# Patient Record
Sex: FEMALE | Race: WHITE | Employment: OTHER | URBAN - NONMETROPOLITAN AREA
[De-identification: names, ages, dates, MRNs, and addresses within clinical notes are randomized per-mention and may not be internally consistent; named-entity substitution may affect disease eponyms.]

---

## 2017-09-14 RX ORDER — DEXLANSOPRAZOLE 60 MG/1
CAPSULE, DELAYED RELEASE ORAL
Qty: 90 CAPSULE | Refills: 3 | OUTPATIENT
Start: 2017-09-14

## 2017-11-07 ENCOUNTER — OFFICE VISIT (OUTPATIENT)
Dept: SURGERY | Age: 72
End: 2017-11-07
Payer: MEDICARE

## 2017-11-07 VITALS
DIASTOLIC BLOOD PRESSURE: 80 MMHG | WEIGHT: 233 LBS | BODY MASS INDEX: 38.82 KG/M2 | HEIGHT: 65 IN | SYSTOLIC BLOOD PRESSURE: 130 MMHG | HEART RATE: 76 BPM

## 2017-11-07 DIAGNOSIS — R22.9 LOCALIZED SKIN MASS, LUMP, OR SWELLING: Primary | ICD-10-CM

## 2017-11-07 PROCEDURE — G8400 PT W/DXA NO RESULTS DOC: HCPCS | Performed by: PHYSICIAN ASSISTANT

## 2017-11-07 PROCEDURE — 1090F PRES/ABSN URINE INCON ASSESS: CPT | Performed by: PHYSICIAN ASSISTANT

## 2017-11-07 PROCEDURE — 4040F PNEUMOC VAC/ADMIN/RCVD: CPT | Performed by: PHYSICIAN ASSISTANT

## 2017-11-07 PROCEDURE — G8484 FLU IMMUNIZE NO ADMIN: HCPCS | Performed by: PHYSICIAN ASSISTANT

## 2017-11-07 PROCEDURE — 3014F SCREEN MAMMO DOC REV: CPT | Performed by: PHYSICIAN ASSISTANT

## 2017-11-07 PROCEDURE — G8428 CUR MEDS NOT DOCUMENT: HCPCS | Performed by: PHYSICIAN ASSISTANT

## 2017-11-07 PROCEDURE — 99212 OFFICE O/P EST SF 10 MIN: CPT | Performed by: PHYSICIAN ASSISTANT

## 2017-11-07 PROCEDURE — 1123F ACP DISCUSS/DSCN MKR DOCD: CPT | Performed by: PHYSICIAN ASSISTANT

## 2017-11-07 PROCEDURE — 4004F PT TOBACCO SCREEN RCVD TLK: CPT | Performed by: PHYSICIAN ASSISTANT

## 2017-11-07 PROCEDURE — 3017F COLORECTAL CA SCREEN DOC REV: CPT | Performed by: PHYSICIAN ASSISTANT

## 2017-11-07 PROCEDURE — G8417 CALC BMI ABV UP PARAM F/U: HCPCS | Performed by: PHYSICIAN ASSISTANT

## 2017-11-07 RX ORDER — DULOXETIN HYDROCHLORIDE 30 MG/1
CAPSULE, DELAYED RELEASE ORAL
COMMUNITY
Start: 2016-09-26

## 2017-11-07 RX ORDER — BIMATOPROST 0.01 %
DROPS OPHTHALMIC (EYE)
COMMUNITY
Start: 2017-09-15

## 2017-11-07 NOTE — Clinical Note
US neck and appt with me the dame day. The only order for US neck included thyroid. Please tell scheduling that we only need to scan the mass, not the thyroid.

## 2017-11-08 NOTE — PROGRESS NOTES
Patient is scheduled on [email or web address] for US and will see Bailee Shafer at 3 Pm. Patient has been notified.

## 2017-11-16 ENCOUNTER — OFFICE VISIT (OUTPATIENT)
Dept: SURGERY | Age: 72
End: 2017-11-16
Payer: MEDICARE

## 2017-11-16 ENCOUNTER — HOSPITAL ENCOUNTER (OUTPATIENT)
Dept: ULTRASOUND IMAGING | Age: 72
Discharge: HOME | End: 2017-11-16
Payer: MEDICARE

## 2017-11-16 VITALS — SYSTOLIC BLOOD PRESSURE: 140 MMHG | DIASTOLIC BLOOD PRESSURE: 70 MMHG | HEART RATE: 80 BPM

## 2017-11-16 DIAGNOSIS — M89.9 LYTIC BONE LESIONS ON XRAY: ICD-10-CM

## 2017-11-16 DIAGNOSIS — R22.1 NECK MASS: Primary | ICD-10-CM

## 2017-11-16 DIAGNOSIS — R93.89 ABNORMAL CT SCAN, NECK: ICD-10-CM

## 2017-11-16 DIAGNOSIS — R22.9 LOCALIZED SKIN MASS, LUMP, OR SWELLING: ICD-10-CM

## 2017-11-16 PROCEDURE — G8417 CALC BMI ABV UP PARAM F/U: HCPCS | Performed by: PHYSICIAN ASSISTANT

## 2017-11-16 PROCEDURE — 3017F COLORECTAL CA SCREEN DOC REV: CPT | Performed by: PHYSICIAN ASSISTANT

## 2017-11-16 PROCEDURE — 76536 US EXAM OF HEAD AND NECK: CPT

## 2017-11-16 PROCEDURE — 99211 OFF/OP EST MAY X REQ PHY/QHP: CPT | Performed by: PHYSICIAN ASSISTANT

## 2017-11-16 PROCEDURE — G8427 DOCREV CUR MEDS BY ELIG CLIN: HCPCS | Performed by: PHYSICIAN ASSISTANT

## 2017-11-16 PROCEDURE — 4040F PNEUMOC VAC/ADMIN/RCVD: CPT | Performed by: PHYSICIAN ASSISTANT

## 2017-11-16 NOTE — Clinical Note
Please schedule pt for bone scan, CT chest abdomen and pelvis with contrast.  She has a very suspicious mass. Please schedule this for Tuesday or Wednesday before her appt with Dr. Houston Lopez.  Please tell her the mass was suspicious and we need to do more scans to rule out any other abnormalities. I'll be happy to talk to her if she has questions.

## 2017-11-17 ENCOUNTER — TELEPHONE (OUTPATIENT)
Dept: SURGERY | Age: 72
End: 2017-11-17

## 2017-11-17 NOTE — TELEPHONE ENCOUNTER
Left VM for patient of appointments for:    CT Scan at [facility] on 11/27/2017 at 8 Am  Will see Verónica Jackson for a results and exam on 12/4/2017 at 1:45 Pm. I also sent appointment cards in the mail for verification.

## 2017-11-27 ENCOUNTER — HOSPITAL ENCOUNTER (OUTPATIENT)
Dept: CT IMAGING | Age: 72
Discharge: HOME | End: 2017-11-27
Payer: MEDICARE

## 2017-11-27 DIAGNOSIS — R22.1 NECK MASS: ICD-10-CM

## 2017-11-27 LAB
GFR NON-AFRICAN AMERICAN: >60
PERFORMED ON: NORMAL
POC CREATININE: 0.9 MG/DL (ref 0.3–1.3)
POC SAMPLE TYPE: NORMAL

## 2017-11-27 PROCEDURE — 70491 CT SOFT TISSUE NECK W/DYE: CPT

## 2017-11-27 PROCEDURE — 82565 ASSAY OF CREATININE: CPT

## 2017-11-27 PROCEDURE — 6360000004 HC RX CONTRAST MEDICATION: Performed by: PHYSICIAN ASSISTANT

## 2017-11-27 RX ADMIN — IOPAMIDOL 90 ML: 755 INJECTION, SOLUTION INTRAVENOUS at 08:11

## 2017-12-06 ENCOUNTER — HOSPITAL ENCOUNTER (OUTPATIENT)
Dept: CT IMAGING | Age: 72
Discharge: HOME | End: 2017-12-06
Payer: MEDICARE

## 2017-12-06 ENCOUNTER — HOSPITAL ENCOUNTER (OUTPATIENT)
Dept: NUCLEAR MEDICINE | Age: 72
Discharge: HOME | End: 2017-12-06
Payer: MEDICARE

## 2017-12-06 ENCOUNTER — OFFICE VISIT (OUTPATIENT)
Dept: SURGERY | Age: 72
End: 2017-12-06
Payer: MEDICARE

## 2017-12-06 ENCOUNTER — TELEPHONE (OUTPATIENT)
Dept: SURGERY | Age: 72
End: 2017-12-06

## 2017-12-06 VITALS
SYSTOLIC BLOOD PRESSURE: 142 MMHG | WEIGHT: 233 LBS | HEART RATE: 76 BPM | BODY MASS INDEX: 38.82 KG/M2 | DIASTOLIC BLOOD PRESSURE: 82 MMHG | HEIGHT: 65 IN

## 2017-12-06 DIAGNOSIS — R22.1 NECK MASS: Primary | ICD-10-CM

## 2017-12-06 DIAGNOSIS — R22.1 NECK MASS: ICD-10-CM

## 2017-12-06 DIAGNOSIS — M89.9 LYTIC BONE LESIONS ON XRAY: ICD-10-CM

## 2017-12-06 DIAGNOSIS — R22.1 MASS OF RIGHT SIDE OF NECK: ICD-10-CM

## 2017-12-06 DIAGNOSIS — R93.89 ABNORMAL CT SCAN, NECK: ICD-10-CM

## 2017-12-06 LAB
GFR NON-AFRICAN AMERICAN: >60
PERFORMED ON: NORMAL
POC CREATININE: 0.8 MG/DL (ref 0.3–1.3)
POC SAMPLE TYPE: NORMAL

## 2017-12-06 PROCEDURE — 1090F PRES/ABSN URINE INCON ASSESS: CPT | Performed by: SURGERY

## 2017-12-06 PROCEDURE — 82565 ASSAY OF CREATININE: CPT

## 2017-12-06 PROCEDURE — 1036F TOBACCO NON-USER: CPT | Performed by: SURGERY

## 2017-12-06 PROCEDURE — 3014F SCREEN MAMMO DOC REV: CPT | Performed by: SURGERY

## 2017-12-06 PROCEDURE — 4040F PNEUMOC VAC/ADMIN/RCVD: CPT | Performed by: SURGERY

## 2017-12-06 PROCEDURE — 6360000004 HC RX CONTRAST MEDICATION: Performed by: PHYSICIAN ASSISTANT

## 2017-12-06 PROCEDURE — G8400 PT W/DXA NO RESULTS DOC: HCPCS | Performed by: SURGERY

## 2017-12-06 PROCEDURE — 74177 CT ABD & PELVIS W/CONTRAST: CPT

## 2017-12-06 PROCEDURE — 71260 CT THORAX DX C+: CPT

## 2017-12-06 PROCEDURE — G8427 DOCREV CUR MEDS BY ELIG CLIN: HCPCS | Performed by: SURGERY

## 2017-12-06 PROCEDURE — 3430000000 HC RX DIAGNOSTIC RADIOPHARMACEUTICAL: Performed by: PHYSICIAN ASSISTANT

## 2017-12-06 PROCEDURE — G8417 CALC BMI ABV UP PARAM F/U: HCPCS | Performed by: SURGERY

## 2017-12-06 PROCEDURE — 78306 BONE IMAGING WHOLE BODY: CPT

## 2017-12-06 PROCEDURE — G8484 FLU IMMUNIZE NO ADMIN: HCPCS | Performed by: SURGERY

## 2017-12-06 PROCEDURE — 99214 OFFICE O/P EST MOD 30 MIN: CPT | Performed by: SURGERY

## 2017-12-06 PROCEDURE — 3017F COLORECTAL CA SCREEN DOC REV: CPT | Performed by: SURGERY

## 2017-12-06 PROCEDURE — A9561 TC99M OXIDRONATE: HCPCS | Performed by: PHYSICIAN ASSISTANT

## 2017-12-06 PROCEDURE — 1123F ACP DISCUSS/DSCN MKR DOCD: CPT | Performed by: SURGERY

## 2017-12-06 RX ADMIN — TECHNETIUM TC 99M OXIDRONATE 20 MILLICURIE: 3.15 INJECTION, POWDER, LYOPHILIZED, FOR SOLUTION INTRAVENOUS at 12:56

## 2017-12-06 RX ADMIN — IOPAMIDOL 90 ML: 755 INJECTION, SOLUTION INTRAVENOUS at 12:49

## 2017-12-06 NOTE — TELEPHONE ENCOUNTER
Patient has decided to proceed with biopsy, if time allows, prior to 2018. She had stated she wanted to wait until after the new year, but know wishes to proceed asap. Thank you.

## 2017-12-07 NOTE — PROGRESS NOTES
HISTORY OF PRESENT ILLNESS:    Ms. Juan Bustos is a 71 y.o. white female who presents with a palpable mass in her posterior right neck. She 1st noticed a month ago it is not particularly tender. Narrative   US HEAD NECK SOFT TISSUE THYROID 11/16/2017 12:50 PM   HISTORY: R22.9   COMPARISON: None   FINDINGS:   Transverse and longitudinal sonographic images of the neck were   obtained in the region of the patient's palpable abnormality. There is a hypoechoic, 2.7 x 2.4 x 1.6 cm mass in the posterior aspect   of the neck. This is deep to the muscle. A small amount of internal   flow is noted.       Impression   1. There is a 2.7 cm mass in the posterior right neck. Further   evaluation with CT of the neck with contrast is recommended. Impression   1. There is a heterogeneously enhancing 2.3 x 2.3 cm mass lateral to   the right T1 transverse process. Primary soft tissue neoplasm and   metastatic disease are in the differential. Given the proximity to the   spine, neurofibroma and schwannoma are in the differential diagnosis. This correlates with the size of the abnormality seen on recent   ultrasound. Consider additional CT imaging of the chest, abdomen and   pelvis to evaluate for any other primary sites or metastatic disease. 2. There is no soft tissue nodule identified in the region of the   palpable abnormality on the lower posterior right neck. 3. There is minimal atheromatous disease in the carotid bifurcations   bilaterally. 4. Small lytic bone lesions scattered throughout the visualized spine   could be benign or malignant. Consider a follow-up bone scan to   further assess the bones. There are degenerative changes of the spine. 5. Small thyroid gland emphysematous appearance of the lungs. CT chest, CT abdomen, and nuclear bone scan all do not demonstrate any evidence of metastatic disease. BREAST EXAM:  Teetee Urbina is a firm nontender 2 cm mass in her right posterior neck.  I suspect it

## 2017-12-27 ENCOUNTER — HOSPITAL ENCOUNTER (OUTPATIENT)
Dept: ULTRASOUND IMAGING | Age: 72
Discharge: HOME | End: 2017-12-27
Payer: MEDICARE

## 2017-12-27 ENCOUNTER — HOSPITAL ENCOUNTER (OUTPATIENT)
Dept: WOMENS IMAGING | Age: 72
Discharge: HOME | End: 2017-12-27
Payer: MEDICARE

## 2017-12-27 DIAGNOSIS — R22.1 NECK MASS: ICD-10-CM

## 2017-12-27 PROCEDURE — 10022 US FINE NEEDLE ASPIRATION: CPT

## 2017-12-27 PROCEDURE — 88333 PATH CONSLTJ SURG CYTO XM 1: CPT

## 2017-12-27 PROCEDURE — 88334 PATH CONSLTJ SURG CYTO XM EA: CPT

## 2017-12-27 PROCEDURE — 88307 TISSUE EXAM BY PATHOLOGIST: CPT

## 2017-12-28 ENCOUNTER — LAB REQUISITION (OUTPATIENT)
Dept: LAB | Facility: HOSPITAL | Age: 72
End: 2017-12-28

## 2017-12-28 DIAGNOSIS — Z00.00 ROUTINE GENERAL MEDICAL EXAMINATION AT A HEALTH CARE FACILITY: ICD-10-CM

## 2017-12-28 LAB
LAB AP CASE REPORT: NORMAL
Lab: NORMAL
PATH REPORT.FINAL DX SPEC: NORMAL

## 2017-12-28 PROCEDURE — 88342 IMHCHEM/IMCYTCHM 1ST ANTB: CPT

## 2017-12-28 PROCEDURE — 88341 IMHCHEM/IMCYTCHM EA ADD ANTB: CPT

## 2018-01-03 ENCOUNTER — TELEPHONE (OUTPATIENT)
Dept: SURGERY | Age: 73
End: 2018-01-03

## 2018-01-03 DIAGNOSIS — R22.1 NECK MASS: Primary | ICD-10-CM

## 2018-01-04 ENCOUNTER — TELEPHONE (OUTPATIENT)
Dept: SURGERY | Age: 73
End: 2018-01-04

## 2018-01-04 DIAGNOSIS — R22.1 NECK MASS: Primary | ICD-10-CM

## 2018-01-05 ENCOUNTER — TELEPHONE (OUTPATIENT)
Dept: SURGERY | Age: 73
End: 2018-01-05

## 2018-01-09 ENCOUNTER — OFFICE VISIT (OUTPATIENT)
Dept: NEUROSURGERY | Facility: CLINIC | Age: 73
End: 2018-01-09

## 2018-01-09 VITALS
DIASTOLIC BLOOD PRESSURE: 98 MMHG | SYSTOLIC BLOOD PRESSURE: 162 MMHG | BODY MASS INDEX: 35.36 KG/M2 | HEIGHT: 66 IN | WEIGHT: 220 LBS

## 2018-01-09 DIAGNOSIS — Z87.891 FORMER SMOKER: ICD-10-CM

## 2018-01-09 DIAGNOSIS — R22.1 NECK MASS: Primary | ICD-10-CM

## 2018-01-09 PROCEDURE — 99214 OFFICE O/P EST MOD 30 MIN: CPT | Performed by: NURSE PRACTITIONER

## 2018-01-09 RX ORDER — DEXLANSOPRAZOLE 60 MG/1
60 CAPSULE, DELAYED RELEASE ORAL
COMMUNITY
End: 2018-02-13 | Stop reason: SDUPTHER

## 2018-01-09 RX ORDER — ACETAMINOPHEN 500 MG
500 TABLET ORAL 2 TIMES DAILY
COMMUNITY

## 2018-01-09 RX ORDER — CELECOXIB 200 MG/1
200 CAPSULE ORAL
COMMUNITY
End: 2020-03-05

## 2018-01-09 RX ORDER — THEOPHYLLINE 300 MG/1
TABLET, EXTENDED RELEASE ORAL
COMMUNITY
Start: 2017-10-17 | End: 2019-04-10 | Stop reason: SDUPTHER

## 2018-01-09 RX ORDER — ERGOCALCIFEROL 1.25 MG/1
CAPSULE ORAL
COMMUNITY
End: 2020-02-17

## 2018-01-09 RX ORDER — BIMATOPROST 0.01 %
DROPS OPHTHALMIC (EYE)
COMMUNITY
Start: 2017-12-14

## 2018-01-09 RX ORDER — TRAZODONE HYDROCHLORIDE 50 MG/1
50 TABLET ORAL NIGHTLY
COMMUNITY
End: 2020-09-08

## 2018-01-09 RX ORDER — DULOXETIN HYDROCHLORIDE 30 MG/1
CAPSULE, DELAYED RELEASE ORAL
COMMUNITY
Start: 2016-09-26 | End: 2020-03-23

## 2018-01-09 RX ORDER — LEVOTHYROXINE SODIUM 0.1 MG/1
TABLET ORAL
COMMUNITY
End: 2020-02-03

## 2018-01-09 RX ORDER — SPIRONOLACTONE 25 MG/1
25 TABLET ORAL DAILY
COMMUNITY
End: 2020-06-25

## 2018-01-09 NOTE — PROGRESS NOTES
Chief complaint:   Chief Complaint   Patient presents with   • right posterior neck mass     Thania returns today with a new problem, she bring with her today an ultrasound and CT scan for review, she was seeing Dr. Purdy for another problem and this mass was found, she was scheduled to see ENT but they told her this was close to nerves and should follow with a neurosurgery.         Subjective     HPI: This is a 72-year-old female patient who was referred to us by Dr. Sanford Mcfarland for a neck mass.  She is here to be evaluated today.  She says that she noticed this back in November and when she went to massage therapist she thought initially that it was muscle spasm but they told her was not a muscle spasm she need follow-up with her doctor.  She ended up going to Dr. Purdy who did not needle biopsy on this and the pathology results seem to suggest a nodular fasciitis versus a benign smooth muscle tumor.  However the pathologist was recommending that he be taken out for further evaluation from a pathologic standpoint.  She says that she is not really been having any pain associated with this or pain radiating into her arm or up into her neck.  She does have some pain but she feels like this is related to the fact that they just did a biopsy.  Denies any numbness and tingling that his been made worse from this.  She does have numbness and tingling in her hands related to carpal tunnel syndrome.  She is not really complaining of any meaningful pain.  Denies any loss of control of bowel or bladder    Review of Systems   Musculoskeletal: Positive for neck pain.   Neurological: Positive for numbness.   All other systems reviewed and are negative.       Past Medical History:   Diagnosis Date   • Glaucoma    • Migraine    • Polymyalgia    • Thyroid disease      Past Surgical History:   Procedure Laterality Date   • APPENDECTOMY     • BACK SURGERY     • BREAST BIOPSY     • CARPAL TUNNEL RELEASE     • GALLBLADDER  "SURGERY     • HYSTERECTOMY     • TONSILLECTOMY       No family history on file.  Social History   Substance Use Topics   • Smoking status: Former Smoker     Quit date: 1980   • Smokeless tobacco: Never Used   • Alcohol use Yes       (Not in a hospital admission)  Allergies:  Review of patient's allergies indicates no known allergies.    Objective      Vital Signs  /98 (BP Location: Right arm, Patient Position: Sitting)  Ht 167.6 cm (66\")  Wt 99.8 kg (220 lb)  BMI 35.51 kg/m2    Physical Exam   Constitutional: She is oriented to person, place, and time. She appears well-developed and well-nourished.   HENT:   Head: Normocephalic.   Eyes: Conjunctivae, EOM and lids are normal. Pupils are equal, round, and reactive to light.   Neck: Normal range of motion.   Cardiovascular: Normal rate, regular rhythm and normal heart sounds.    Pulmonary/Chest: Effort normal and breath sounds normal.   Abdominal: Normal appearance.   Musculoskeletal: Normal range of motion.   Neurological: She is alert and oriented to person, place, and time. She has normal strength and normal reflexes. She displays normal reflexes. No cranial nerve deficit or sensory deficit. GCS eye subscore is 4. GCS verbal subscore is 5. GCS motor subscore is 6.   Skin: Skin is warm.   Psychiatric: She has a normal mood and affect. Her speech is normal and behavior is normal. Thought content normal. Cognition and memory are normal.       Results Review: CT scan of the soft tissues of the neck shows the patient does have a mass on the right side of her neck that is adjacent to the transverse process of T1.          Assessment/Plan: Dr. Palencia did review the imaging and discuss this with the patient.  We are going to order an MRI of her cervical spine with and without contrast to include down to T2 for further anatomy visualization and also to see this does enhance with contrast.  We will follow-up with her once this is completed and make a surgical plan " for removing the tumor.  BMI shows that she is very overweight.  BMI chart was given the patient.  She is a nonsmoker      Thania was seen today for right posterior neck mass.    Diagnoses and all orders for this visit:    Neck mass  -     MRI Cervical Spine With & Without Contrast; Future    Former smoker    BMI 35.0-35.9,adult        I discussed the patients findings and my recommendations with patient    Eron Levine, DESHAWN  01/09/18  1:05 PM

## 2018-01-12 ENCOUNTER — HOSPITAL ENCOUNTER (OUTPATIENT)
Dept: MRI IMAGING | Facility: HOSPITAL | Age: 73
Discharge: HOME | End: 2018-01-12
Admitting: NURSE PRACTITIONER

## 2018-01-12 DIAGNOSIS — R22.1 NECK MASS: ICD-10-CM

## 2018-01-12 PROCEDURE — 72156 MRI NECK SPINE W/O & W/DYE: CPT

## 2018-01-12 PROCEDURE — 0 GADOBENATE DIMEGLUMINE 529 MG/ML SOLUTION: Performed by: NURSE PRACTITIONER

## 2018-01-12 PROCEDURE — A9577 INJ MULTIHANCE: HCPCS | Performed by: NURSE PRACTITIONER

## 2018-01-12 PROCEDURE — 82565 ASSAY OF CREATININE: CPT

## 2018-01-12 RX ADMIN — GADOBENATE DIMEGLUMINE 20 ML: 529 INJECTION, SOLUTION INTRAVENOUS at 09:15

## 2018-01-16 LAB — CREAT BLDA-MCNC: 0.9 MG/DL (ref 0.6–1.3)

## 2018-01-17 ENCOUNTER — OFFICE VISIT (OUTPATIENT)
Dept: NEUROSURGERY | Facility: CLINIC | Age: 73
End: 2018-01-17

## 2018-01-17 VITALS
DIASTOLIC BLOOD PRESSURE: 98 MMHG | HEIGHT: 66 IN | SYSTOLIC BLOOD PRESSURE: 162 MMHG | WEIGHT: 220 LBS | BODY MASS INDEX: 35.36 KG/M2

## 2018-01-17 DIAGNOSIS — Z78.9 NON-SMOKER: ICD-10-CM

## 2018-01-17 DIAGNOSIS — D48.7 NEOPLASM OF UNCERTAIN BEHAVIOR OF NECK: Primary | ICD-10-CM

## 2018-01-17 PROCEDURE — 99213 OFFICE O/P EST LOW 20 MIN: CPT | Performed by: NEUROLOGICAL SURGERY

## 2018-01-17 NOTE — H&P
SUBJECTIVE:  Patient ID: Thania Casiano is a 72 y.o. female is here today for follow-up.    Chief Complaint:tumor  Chief Complaint   Patient presents with   • Neck Mass     patient is here today to discuss surgical option       HPI  72-year-old female we have been following for a root right paraspinal soft tissue mass in her neck.  We sent her for an MRI with and without contrast she is here to discuss results.  This lesion has been biopsied with a needle aspiration in the past.  No meaningful complaints associated with it.    The following portions of the patient's history were reviewed and updated as appropriate: allergies, current medications, past family history, past medical history, past social history, past surgical history and problem list.    OBJECTIVE:    Review of Systems   All other systems reviewed and are negative.         Physical Exam   Constitutional: She is oriented to person, place, and time. She appears well-developed and well-nourished.   HENT:   Head: Normocephalic and atraumatic.   Right Ear: Hearing normal.   Left Ear: Hearing normal.   Eyes: EOM are normal. Pupils are equal, round, and reactive to light.   Neck: Normal range of motion.   Neurological: She is alert and oriented to person, place, and time. She has normal strength and normal reflexes. No cranial nerve deficit or sensory deficit. She displays a negative Romberg sign. GCS eye subscore is 4. GCS verbal subscore is 5. GCS motor subscore is 6. She displays no Babinski's sign on the right side. She displays no Babinski's sign on the left side.   Psychiatric: Her speech is normal. Judgment normal. Cognition and memory are normal.       Neurologic Exam     Mental Status   Oriented to person, place, and time.   Speech: speech is normal     Cranial Nerves     CN III, IV, VI   Pupils are equal, round, and reactive to light.  Extraocular motions are normal.     Motor Exam     Strength   Strength 5/5 throughout.       Independent Review  of Radiographic Studies:   MRI of the cervical spine does show a 2.5 x 2.8 cm homogeneously enhancing mass in the paraspinal soft tissues on the right.  It does not here to be associated with any major vascular structure it does not appear to be associated with any major neurologic structure.    ASSESSMENT/PLAN:  Mrs. Casiano did have a fine-needle biopsy with suggestion of a benign tumor however the pathologist feels that a better pathologic diagnosis will be obtained with a large sample.  I think would be reasonable to take her to the operating room for a gross total resection of this tumor.  The risks and benefits were discussed at length which included but were not limited to infection, bleeding, nerve damage, pain, stroke, coma, and death.  She knowledge understand this.  Her questions concerns were addressed.  We will get her preop and scheduled for a right excision of soft tissue tumor of the neck.      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 BMI 35.0-35.9,adult    3. Non-smoker            Return for postoperatively.      Yoav Palencia MD

## 2018-01-17 NOTE — PATIENT INSTRUCTIONS

## 2018-01-19 ENCOUNTER — HOSPITAL ENCOUNTER (OUTPATIENT)
Dept: GENERAL RADIOLOGY | Facility: HOSPITAL | Age: 73
Discharge: HOME | End: 2018-01-19
Admitting: NEUROLOGICAL SURGERY

## 2018-01-19 ENCOUNTER — APPOINTMENT (OUTPATIENT)
Dept: PREADMISSION TESTING | Facility: HOSPITAL | Age: 73
End: 2018-01-19

## 2018-01-19 VITALS
RESPIRATION RATE: 16 BRPM | HEART RATE: 82 BPM | HEIGHT: 64 IN | SYSTOLIC BLOOD PRESSURE: 188 MMHG | DIASTOLIC BLOOD PRESSURE: 96 MMHG | OXYGEN SATURATION: 99 % | BODY MASS INDEX: 40.01 KG/M2 | WEIGHT: 234.35 LBS

## 2018-01-19 DIAGNOSIS — D48.7 NEOPLASM OF UNCERTAIN BEHAVIOR OF NECK: ICD-10-CM

## 2018-01-19 LAB
ALBUMIN SERPL-MCNC: 4.2 G/DL (ref 3.5–5)
ALBUMIN/GLOB SERPL: 1.7 G/DL (ref 1.1–2.5)
ALP SERPL-CCNC: 88 U/L (ref 24–120)
ALT SERPL W P-5'-P-CCNC: 28 U/L (ref 0–54)
ANION GAP SERPL CALCULATED.3IONS-SCNC: 7 MMOL/L (ref 4–13)
AST SERPL-CCNC: 29 U/L (ref 7–45)
BILIRUB SERPL-MCNC: 0.3 MG/DL (ref 0.1–1)
BILIRUB UR QL STRIP: NEGATIVE
BUN BLD-MCNC: 16 MG/DL (ref 5–21)
BUN/CREAT SERPL: 18.2 (ref 7–25)
CALCIUM SPEC-SCNC: 9.5 MG/DL (ref 8.4–10.4)
CHLORIDE SERPL-SCNC: 101 MMOL/L (ref 98–110)
CLARITY UR: ABNORMAL
CO2 SERPL-SCNC: 31 MMOL/L (ref 24–31)
COLOR UR: YELLOW
CREAT BLD-MCNC: 0.88 MG/DL (ref 0.5–1.4)
DEPRECATED RDW RBC AUTO: 41.2 FL (ref 40–54)
ERYTHROCYTE [DISTWIDTH] IN BLOOD BY AUTOMATED COUNT: 12.6 % (ref 12–15)
GFR SERPL CREATININE-BSD FRML MDRD: 63 ML/MIN/1.73
GLOBULIN UR ELPH-MCNC: 2.5 GM/DL
GLUCOSE BLD-MCNC: 125 MG/DL (ref 70–100)
GLUCOSE UR STRIP-MCNC: NEGATIVE MG/DL
HCT VFR BLD AUTO: 38.6 % (ref 37–47)
HGB BLD-MCNC: 12.8 G/DL (ref 12–16)
HGB UR QL STRIP.AUTO: NEGATIVE
KETONES UR QL STRIP: ABNORMAL
LEUKOCYTE ESTERASE UR QL STRIP.AUTO: NEGATIVE
MCH RBC QN AUTO: 29.4 PG (ref 28–32)
MCHC RBC AUTO-ENTMCNC: 33.2 G/DL (ref 33–36)
MCV RBC AUTO: 88.7 FL (ref 82–98)
NITRITE UR QL STRIP: NEGATIVE
PH UR STRIP.AUTO: <=5 [PH] (ref 5–8)
PLATELET # BLD AUTO: 196 10*3/MM3 (ref 130–400)
PMV BLD AUTO: 11.1 FL (ref 6–12)
POTASSIUM BLD-SCNC: 3.4 MMOL/L (ref 3.5–5.3)
PROT SERPL-MCNC: 6.7 G/DL (ref 6.3–8.7)
PROT UR QL STRIP: NEGATIVE
RBC # BLD AUTO: 4.35 10*6/MM3 (ref 4.2–5.4)
SODIUM BLD-SCNC: 139 MMOL/L (ref 135–145)
SP GR UR STRIP: 1.02 (ref 1–1.03)
UROBILINOGEN UR QL STRIP: ABNORMAL
WBC NRBC COR # BLD: 4.76 10*3/MM3 (ref 4.8–10.8)

## 2018-01-19 PROCEDURE — 71046 X-RAY EXAM CHEST 2 VIEWS: CPT

## 2018-01-19 PROCEDURE — 93010 ELECTROCARDIOGRAM REPORT: CPT | Performed by: INTERNAL MEDICINE

## 2018-01-19 PROCEDURE — 81003 URINALYSIS AUTO W/O SCOPE: CPT | Performed by: NEUROLOGICAL SURGERY

## 2018-01-19 PROCEDURE — 85027 COMPLETE CBC AUTOMATED: CPT | Performed by: NEUROLOGICAL SURGERY

## 2018-01-19 PROCEDURE — 36415 COLL VENOUS BLD VENIPUNCTURE: CPT

## 2018-01-19 PROCEDURE — 93005 ELECTROCARDIOGRAM TRACING: CPT

## 2018-01-19 PROCEDURE — 80053 COMPREHEN METABOLIC PANEL: CPT | Performed by: NEUROLOGICAL SURGERY

## 2018-01-19 NOTE — DISCHARGE INSTRUCTIONS
DAY OF SURGERY INSTRUCTIONS        YOUR SURGEON: DR MICHAELA BAIRES    PROCEDURE: CERVICAL SPINAL TUMOR REMOVAL RIGHT    DATE OF SURGERY: January 26, 2018    ARRIVAL TIME: AS DIRECTED BY OFFICE    DAY OF SURGERY TAKE ONLY THESE MEDICATIONS: NONE          BEFORE YOU COME TO THE HOSPITAL  (Pre-op instructions)  • Do not eat, drink, smoke or chew gum after midnight the night before surgery.  This also includes no mints.  • Morning of surgery take only the medicines you have been instructed with a sip of water unless otherwise instructed  by your physician.  • Do not shave, wear makeup or dark nail polish.  • Remove all jewelry including rings.  • Leave anything you consider valuable at home.  • Leave your suitcase in the car until after your surgery.  • Bring the following with you if applicable:  o Picture ID and insurance, Medicare or Medicaid cards  o Co-pay/deductible required by insurance (cash, check, credit card)  o Copy of advance directive, living will or power-of- documents if not brought to PAT  o CPAP or BIPAP mask and tubing  o Relaxation aids (MP3 player, book, magazine)  • On the day of surgery check in at registration located at the main entrance of the hospital.       Outpatient Surgery Guidelines, Adult  Outpatient procedures are those for which the person having the procedure is allowed to go home the same day as the procedure. Various procedures are done on an outpatient basis. You should follow some general guidelines if you will be having an outpatient procedure.  LET YOUR HEALTH CARE PROVIDER KNOW ABOUT:  · Any allergies you have.  · All medicines you are taking, including vitamins, herbs, eye drops, creams, and over-the-counter medicines.  · Previous problems you or members of your family have had with the use of anesthetics.  · Any blood disorders you have.  · Previous surgeries you have had.  · Medical conditions you have.  RISKS AND COMPLICATIONS  Your health care provider will  discuss possible risks and complications with you before surgery. Common risks and complications include:    · Problems due to the use of anesthetics.  · Blood loss and replacement (does not apply to minor surgical procedures).  · Temporary increase in pain due to surgery.  · Uncorrected pain or problems that the surgery was meant to correct.  · Infection.  · New damage.  BEFORE THE PROCEDURE  · Ask your health care provider about changing or stopping your regular medicines. You may need to stop taking certain medicines in the days or weeks before the procedure.  · Stop smoking at least 2 weeks before surgery. This lowers your risk for complications during and after surgery. Ask your health care provider for help with this if needed.  · Eat your usual meals and a light supper the day before surgery. Continue fluid intake. Do not drink alcohol.  · Do not eat or drink after midnight the night before your surgery.   · Arrange for someone to take you home and to stay with you for 24 hours after the procedure. Medicine given for your procedure may affect your ability to drive or to care for yourself.  · Call your health care provider's office if you develop an illness or problem that may prevent you from safely having your procedure.  AFTER THE PROCEDURE  After surgery, you will be taken to a recovery area, where your progress will be monitored. If there are no complications, you will be allowed to go home when you are awake, stable, and taking fluids well. You may have numbness around the surgical site. Healing will take some time. You will have tenderness at the surgical site and may have some swelling and bruising. You may also have some nausea.  HOME CARE INSTRUCTIONS  · Do not drive for 24 hours, or as directed by your health care provider. Do not drive while taking prescription pain medicines.  · Do not drink alcohol for 24 hours.  · Do not make important decisions or sign legal documents for 24 hours.  · You may  resume a normal diet and activities as directed.  · Do not lift anything heavier than 10 pounds (4.5 kg) or play contact sports until your health care provider says it is okay.  · Change your bandages (dressings) as directed.  · Only take over-the-counter or prescription medicines as directed by your health care provider.  · Follow up with your health care provider as directed.  SEEK MEDICAL CARE IF:  · You have increased bleeding (more than a small spot) from the surgical site.  · You have redness, swelling, or increasing pain in the wound.  · You see pus coming from the wound.  · You have a fever.  · You notice a bad smell coming from the wound or dressing.  · You feel lightheaded or faint.  · You develop a rash.  · You have trouble breathing.  · You develop allergies.  MAKE SURE YOU:  · Understand these instructions.  · Will watch your condition.  · Will get help right away if you are not doing well or get worse.     This information is not intended to replace advice given to you by your health care provider. Make sure you discuss any questions you have with your health care provider.     Document Released: 09/12/2002 Document Revised: 05/03/2016 Document Reviewed: 05/22/2014  [employer] Interactive Patient Education ©2016 [employer]       Fall Prevention in Hospitals, Adult  As a hospital patient, your condition and the treatments you receive can increase your risk for falls. Some additional risk factors for falls in a hospital include:  · Being in an unfamiliar environment.  · Being on bed rest.  · Your surgery.  · Taking certain medicines.  · Your tubing requirements, such as intravenous (IV) therapy or catheters.  It is important that you learn how to decrease fall risks while at the hospital. Below are important tips that can help prevent falls.  SAFETY TIPS FOR PREVENTING FALLS  Talk about your risk of falling.  · Ask your health care provider why you are at risk for falling. Is it your medicine, illness,  tubing placement, or something else?  · Make a plan with your health care provider to keep you safe from falls.  · Ask your health care provider or pharmacist about side effects of your medicines. Some medicines can make you dizzy or affect your coordination.  Ask for help.  · Ask for help before getting out of bed. You may need to press your call button.  · Ask for assistance in getting safely to the toilet.  · Ask for a walker or cane to be put at your bedside. Ask that most of the side rails on your bed be placed up before your health care provider leaves the room.  · Ask family or friends to sit with you.  · Ask for things that are out of your reach, such as your glasses, hearing aids, telephone, bedside table, or call button.  Follow these tips to avoid falling:  · Stay lying or seated, rather than standing, while waiting for help.  · Wear rubber-soled slippers or shoes whenever you walk in the hospital.  · Avoid quick, sudden movements.  ¨ Change positions slowly.  ¨ Sit on the side of your bed before standing.  ¨ Stand up slowly and wait before you start to walk.  · Let your health care provider know if there is a spill on the floor.  · Pay careful attention to the medical equipment, electrical cords, and tubes around you.  · When you need help, use your call button by your bed or in the bathroom. Wait for one of your health care providers to help you.  · If you feel dizzy or unsure of your footing, return to bed and wait for assistance.  · Avoid being distracted by the TV, telephone, or another person in your room.  · Do not lean or support yourself on rolling objects, such as IV poles or bedside tables.     This information is not intended to replace advice given to you by your health care provider. Make sure you discuss any questions you have with your health care provider.     Document Released: 12/15/2001 Document Revised: 01/08/2016 Document Reviewed: 08/25/2013  [employer] Interactive Patient Education  ©2016 [employer]       Surgical Site Infections FAQs  What is a Surgical Site Infection (SSI)?  A surgical site infection is an infection that occurs after surgery in the part of the body where the surgery took place. Most patients who have surgery do not develop an infection. However, infections develop in about 1 to 3 out of every 100 patients who have surgery.  Some of the common symptoms of a surgical site infection are:  · Redness and pain around the area where you had surgery  · Drainage of cloudy fluid from your surgical wound  · Fever  Can SSIs be treated?  Yes. Most surgical site infections can be treated with antibiotics. The antibiotic given to you depends on the bacteria (germs) causing the infection. Sometimes patients with SSIs also need another surgery to treat the infection.  What are some of the things that hospitals are doing to prevent SSIs?  To prevent SSIs, doctors, nurses, and other healthcare providers:  · Clean their hands and arms up to their elbows with an antiseptic agent just before the surgery.  · Clean their hands with soap and water or an alcohol-based hand rub before and after caring for each patient.  · May remove some of your hair immediately before your surgery using electric clippers if the hair is in the same area where the procedure will occur. They should not shave you with a razor.  · Wear special hair covers, masks, gowns, and gloves during surgery to keep the surgery area clean.  · Give you antibiotics before your surgery starts. In most cases, you should get antibiotics within 60 minutes before the surgery starts and the antibiotics should be stopped within 24 hours after surgery.  · Clean the skin at the site of your surgery with a special soap that kills germs.  What can I do to help prevent SSIs?  Before your surgery:  · Tell your doctor about other medical problems you may have. Health problems such as allergies, diabetes, and obesity could affect your surgery and  your treatment.  · Quit smoking. Patients who smoke get more infections. Talk to your doctor about how you can quit before your surgery.  · Do not shave near where you will have surgery. Shaving with a razor can irritate your skin and make it easier to develop an infection.  At the time of your surgery:  · Speak up if someone tries to shave you with a razor before surgery. Ask why you need to be shaved and talk with your surgeon if you have any concerns.  · Ask if you will get antibiotics before surgery.  After your surgery:  · Make sure that your healthcare providers clean their hands before examining you, either with soap and water or an alcohol-based hand rub.  · If you do not see your providers clean their hands, please ask them to do so.  · Family and friends who visit you should not touch the surgical wound or dressings.  · Family and friends should clean their hands with soap and water or an alcohol-based hand rub before and after visiting you. If you do not see them clean their hands, ask them to clean their hands.  What do I need to do when I go home from the hospital?  · Before you go home, your doctor or nurse should explain everything you need to know about taking care of your wound. Make sure you understand how to care for your wound before you leave the hospital.  · Always clean your hands before and after caring for your wound.  · Before you go home, make sure you know who to contact if you have questions or problems after you get home.  · If you have any symptoms of an infection, such as redness and pain at the surgery site, drainage, or fever, call your doctor immediately.  If you have additional questions, please ask your doctor or nurse.  Developed and co-sponsored by The Society for Healthcare Epidemiology of [place] (SHEA); Infectious Diseases Society of [place] (IDSA); American Hospital Association; Association for Professionals in Infection Control and Epidemiology (APIC); Centers for Disease  Control and Prevention (CDC); and The Joint Commission.     This information is not intended to replace advice given to you by your health care provider. Make sure you discuss any questions you have with your health care provider.     Document Released: 12/23/2014 Document Revised: 01/08/2016 Document Reviewed: 03/02/2016  [employer] Interactive Patient Education ©2016 [employer].       [facility]  CHG 4% Patient Instruction Sheet    Preparing the Skin Before Surgery  Preparing or “prepping” skin before surgery can reduce the risk of infection at the surgical site. To make the process easier,[facility] has chosen 4% Chlorhexidine Gluconate (CHG) antiseptic solution.   The steps below outline the prepping process and should be carefully followed.                                                                                                                                                      Prep the skin at the following time(s):                                                      We recommend you shower the night before surgery, and again the morning of surgery with the 4% CHG antiseptic solution using half of the bottle and a cloth each time.  Dress in clean clothes/sleepwear after showering.  See instructions below for application.          Do not apply any lotions or moisturizers.       Do not shave the area to be prepped for at least 2 days prior to surgery.    Clipping the hair may be done immediately prior to your surgery at the hospital    if needed.    Directions:  Thoroughly rinse your body with water.  Apply 4% CHG to a cloth and wash skin gently, paying special attention to the operative site.  Rinse again thoroughly.  Once you have begun using this product do not apply anything else to your skin. If itching or redness persists, rinse affected areas and discontinue use.    When using this product:  • Keep out of eyes, ears, and mouth.  • If solution should contact these areas, rinse out promptly  and thoroughly with water.  • For external use only.  • Do not use in genital area, on your face or head.      PATIENT/FAMILY/RESPONSIBLE PARTY VERBALIZES UNDERSTANDING OF ABOVE EDUCATION.  COPY OF PAIN SCALE GIVEN AND REVIEWED WITH VERBALIZED UNDERSTANDING.

## 2018-01-26 ENCOUNTER — ANESTHESIA EVENT (OUTPATIENT)
Dept: PERIOP | Facility: HOSPITAL | Age: 73
End: 2018-01-26

## 2018-01-26 ENCOUNTER — HOSPITAL ENCOUNTER (OUTPATIENT)
Facility: HOSPITAL | Age: 73
Setting detail: SURGERY ADMIT
Discharge: HOME | End: 2018-01-26
Attending: NEUROLOGICAL SURGERY | Admitting: NEUROLOGICAL SURGERY

## 2018-01-26 ENCOUNTER — ANESTHESIA (OUTPATIENT)
Dept: PERIOP | Facility: HOSPITAL | Age: 73
End: 2018-01-26

## 2018-01-26 VITALS
OXYGEN SATURATION: 93 % | RESPIRATION RATE: 20 BRPM | DIASTOLIC BLOOD PRESSURE: 79 MMHG | TEMPERATURE: 98 F | SYSTOLIC BLOOD PRESSURE: 147 MMHG | HEART RATE: 88 BPM

## 2018-01-26 DIAGNOSIS — D48.7 NEOPLASM OF UNCERTAIN BEHAVIOR OF NECK: ICD-10-CM

## 2018-01-26 LAB
ABO GROUP BLD: NORMAL
BLD GP AB SCN SERPL QL: NEGATIVE
RH BLD: POSITIVE

## 2018-01-26 PROCEDURE — 25010000002 DEXAMETHASONE PER 1 MG: Performed by: NURSE ANESTHETIST, CERTIFIED REGISTERED

## 2018-01-26 PROCEDURE — 25010000002 DEXAMETHASONE PER 1 MG: Performed by: ANESTHESIOLOGY

## 2018-01-26 PROCEDURE — 86850 RBC ANTIBODY SCREEN: CPT | Performed by: NEUROLOGICAL SURGERY

## 2018-01-26 PROCEDURE — 25010000002 SUCCINYLCHOLINE PER 20 MG: Performed by: NURSE ANESTHETIST, CERTIFIED REGISTERED

## 2018-01-26 PROCEDURE — 25010000002 MIDAZOLAM PER 1 MG: Performed by: ANESTHESIOLOGY

## 2018-01-26 PROCEDURE — 25010000002 PROPOFOL 10 MG/ML EMULSION: Performed by: NURSE ANESTHETIST, CERTIFIED REGISTERED

## 2018-01-26 PROCEDURE — 88341 IMHCHEM/IMCYTCHM EA ADD ANTB: CPT | Performed by: NEUROLOGICAL SURGERY

## 2018-01-26 PROCEDURE — 88331 PATH CONSLTJ SURG 1 BLK 1SPC: CPT | Performed by: NEUROLOGICAL SURGERY

## 2018-01-26 PROCEDURE — 25010000002 MORPHINE SULFATE (PF) 2 MG/ML SOLUTION: Performed by: ANESTHESIOLOGY

## 2018-01-26 PROCEDURE — 88305 TISSUE EXAM BY PATHOLOGIST: CPT | Performed by: NEUROLOGICAL SURGERY

## 2018-01-26 PROCEDURE — 25010000002 ONDANSETRON PER 1 MG: Performed by: NURSE ANESTHETIST, CERTIFIED REGISTERED

## 2018-01-26 PROCEDURE — 25010000002 FENTANYL CITRATE (PF) 100 MCG/2ML SOLUTION: Performed by: NURSE ANESTHETIST, CERTIFIED REGISTERED

## 2018-01-26 PROCEDURE — 21556 EXC NECK TUM DEEP < 5 CM: CPT | Performed by: NEUROLOGICAL SURGERY

## 2018-01-26 PROCEDURE — 86901 BLOOD TYPING SEROLOGIC RH(D): CPT | Performed by: NEUROLOGICAL SURGERY

## 2018-01-26 PROCEDURE — 88342 IMHCHEM/IMCYTCHM 1ST ANTB: CPT | Performed by: NEUROLOGICAL SURGERY

## 2018-01-26 PROCEDURE — 25010000002 METOCLOPRAMIDE PER 10 MG: Performed by: ANESTHESIOLOGY

## 2018-01-26 PROCEDURE — 88360 TUMOR IMMUNOHISTOCHEM/MANUAL: CPT | Performed by: NEUROLOGICAL SURGERY

## 2018-01-26 PROCEDURE — 86900 BLOOD TYPING SEROLOGIC ABO: CPT | Performed by: NEUROLOGICAL SURGERY

## 2018-01-26 PROCEDURE — 25010000002 ONDANSETRON PER 1 MG: Performed by: ANESTHESIOLOGY

## 2018-01-26 PROCEDURE — 88321 CONSLTJ&REPRT SLD PREP ELSWR: CPT | Performed by: NEUROLOGICAL SURGERY

## 2018-01-26 RX ORDER — MIDAZOLAM HYDROCHLORIDE 1 MG/ML
2 INJECTION INTRAMUSCULAR; INTRAVENOUS
Status: DISCONTINUED | OUTPATIENT
Start: 2018-01-26 | End: 2018-01-26 | Stop reason: HOSPADM

## 2018-01-26 RX ORDER — MAGNESIUM HYDROXIDE 1200 MG/15ML
LIQUID ORAL AS NEEDED
Status: DISCONTINUED | OUTPATIENT
Start: 2018-01-26 | End: 2018-01-26 | Stop reason: HOSPADM

## 2018-01-26 RX ORDER — FLUMAZENIL 0.1 MG/ML
0.2 INJECTION INTRAVENOUS AS NEEDED
Status: DISCONTINUED | OUTPATIENT
Start: 2018-01-26 | End: 2018-01-26 | Stop reason: HOSPADM

## 2018-01-26 RX ORDER — SODIUM CHLORIDE, SODIUM LACTATE, POTASSIUM CHLORIDE, CALCIUM CHLORIDE 600; 310; 30; 20 MG/100ML; MG/100ML; MG/100ML; MG/100ML
100 INJECTION, SOLUTION INTRAVENOUS CONTINUOUS
Status: DISCONTINUED | OUTPATIENT
Start: 2018-01-26 | End: 2018-01-26 | Stop reason: HOSPADM

## 2018-01-26 RX ORDER — LABETALOL HYDROCHLORIDE 5 MG/ML
5 INJECTION, SOLUTION INTRAVENOUS
Status: DISCONTINUED | OUTPATIENT
Start: 2018-01-26 | End: 2018-01-26 | Stop reason: HOSPADM

## 2018-01-26 RX ORDER — METOCLOPRAMIDE HYDROCHLORIDE 5 MG/ML
10 INJECTION INTRAMUSCULAR; INTRAVENOUS ONCE AS NEEDED
Status: COMPLETED | OUTPATIENT
Start: 2018-01-26 | End: 2018-01-26

## 2018-01-26 RX ORDER — SODIUM CHLORIDE 0.9 % (FLUSH) 0.9 %
3 SYRINGE (ML) INJECTION AS NEEDED
Status: DISCONTINUED | OUTPATIENT
Start: 2018-01-26 | End: 2018-01-26 | Stop reason: HOSPADM

## 2018-01-26 RX ORDER — ONDANSETRON 2 MG/ML
4 INJECTION INTRAMUSCULAR; INTRAVENOUS AS NEEDED
Status: DISCONTINUED | OUTPATIENT
Start: 2018-01-26 | End: 2018-01-26 | Stop reason: HOSPADM

## 2018-01-26 RX ORDER — DEXAMETHASONE SODIUM PHOSPHATE 4 MG/ML
4 INJECTION, SOLUTION INTRA-ARTICULAR; INTRALESIONAL; INTRAMUSCULAR; INTRAVENOUS; SOFT TISSUE ONCE AS NEEDED
Status: COMPLETED | OUTPATIENT
Start: 2018-01-26 | End: 2018-01-26

## 2018-01-26 RX ORDER — LIDOCAINE HYDROCHLORIDE 20 MG/ML
INJECTION, SOLUTION INFILTRATION; PERINEURAL AS NEEDED
Status: DISCONTINUED | OUTPATIENT
Start: 2018-01-26 | End: 2018-01-26 | Stop reason: SURG

## 2018-01-26 RX ORDER — METOCLOPRAMIDE HYDROCHLORIDE 5 MG/ML
5 INJECTION INTRAMUSCULAR; INTRAVENOUS
Status: DISCONTINUED | OUTPATIENT
Start: 2018-01-26 | End: 2018-01-26 | Stop reason: HOSPADM

## 2018-01-26 RX ORDER — PHENYLEPHRINE HCL IN 0.9% NACL 0.8MG/10ML
SYRINGE (ML) INTRAVENOUS AS NEEDED
Status: DISCONTINUED | OUTPATIENT
Start: 2018-01-26 | End: 2018-01-26 | Stop reason: SURG

## 2018-01-26 RX ORDER — NALOXONE HCL 0.4 MG/ML
0.04 VIAL (ML) INJECTION AS NEEDED
Status: DISCONTINUED | OUTPATIENT
Start: 2018-01-26 | End: 2018-01-26 | Stop reason: HOSPADM

## 2018-01-26 RX ORDER — LIDOCAINE HYDROCHLORIDE 40 MG/ML
SOLUTION TOPICAL AS NEEDED
Status: DISCONTINUED | OUTPATIENT
Start: 2018-01-26 | End: 2018-01-26 | Stop reason: SURG

## 2018-01-26 RX ORDER — SODIUM CHLORIDE 0.9 % (FLUSH) 0.9 %
1-10 SYRINGE (ML) INJECTION AS NEEDED
Status: DISCONTINUED | OUTPATIENT
Start: 2018-01-26 | End: 2018-01-26 | Stop reason: HOSPADM

## 2018-01-26 RX ORDER — SODIUM CHLORIDE, SODIUM LACTATE, POTASSIUM CHLORIDE, CALCIUM CHLORIDE 600; 310; 30; 20 MG/100ML; MG/100ML; MG/100ML; MG/100ML
1000 INJECTION, SOLUTION INTRAVENOUS CONTINUOUS
Status: DISCONTINUED | OUTPATIENT
Start: 2018-01-26 | End: 2018-01-26 | Stop reason: HOSPADM

## 2018-01-26 RX ORDER — OXYCODONE AND ACETAMINOPHEN 7.5; 325 MG/1; MG/1
1 TABLET ORAL EVERY 6 HOURS PRN
Status: DISCONTINUED | OUTPATIENT
Start: 2018-01-26 | End: 2018-01-26 | Stop reason: HOSPADM

## 2018-01-26 RX ORDER — HYDROCODONE BITARTRATE AND ACETAMINOPHEN 7.5; 325 MG/1; MG/1
1-2 TABLET ORAL EVERY 6 HOURS PRN
Qty: 60 TABLET | Refills: 0 | Status: SHIPPED | OUTPATIENT
Start: 2018-01-26 | End: 2018-03-28

## 2018-01-26 RX ORDER — MORPHINE SULFATE 2 MG/ML
2 INJECTION, SOLUTION INTRAMUSCULAR; INTRAVENOUS AS NEEDED
Status: DISCONTINUED | OUTPATIENT
Start: 2018-01-26 | End: 2018-01-26 | Stop reason: HOSPADM

## 2018-01-26 RX ORDER — PROPOFOL 10 MG/ML
VIAL (ML) INTRAVENOUS AS NEEDED
Status: DISCONTINUED | OUTPATIENT
Start: 2018-01-26 | End: 2018-01-26 | Stop reason: SURG

## 2018-01-26 RX ORDER — SUCCINYLCHOLINE CHLORIDE 20 MG/ML
INJECTION INTRAMUSCULAR; INTRAVENOUS AS NEEDED
Status: DISCONTINUED | OUTPATIENT
Start: 2018-01-26 | End: 2018-01-26 | Stop reason: SURG

## 2018-01-26 RX ORDER — IPRATROPIUM BROMIDE AND ALBUTEROL SULFATE 2.5; .5 MG/3ML; MG/3ML
3 SOLUTION RESPIRATORY (INHALATION) ONCE AS NEEDED
Status: DISCONTINUED | OUTPATIENT
Start: 2018-01-26 | End: 2018-01-26 | Stop reason: HOSPADM

## 2018-01-26 RX ORDER — ROCURONIUM BROMIDE 10 MG/ML
INJECTION, SOLUTION INTRAVENOUS AS NEEDED
Status: DISCONTINUED | OUTPATIENT
Start: 2018-01-26 | End: 2018-01-26 | Stop reason: SURG

## 2018-01-26 RX ORDER — DEXAMETHASONE SODIUM PHOSPHATE 4 MG/ML
INJECTION, SOLUTION INTRA-ARTICULAR; INTRALESIONAL; INTRAMUSCULAR; INTRAVENOUS; SOFT TISSUE AS NEEDED
Status: DISCONTINUED | OUTPATIENT
Start: 2018-01-26 | End: 2018-01-26 | Stop reason: SURG

## 2018-01-26 RX ORDER — HYDRALAZINE HYDROCHLORIDE 20 MG/ML
5 INJECTION INTRAMUSCULAR; INTRAVENOUS
Status: DISCONTINUED | OUTPATIENT
Start: 2018-01-26 | End: 2018-01-26 | Stop reason: HOSPADM

## 2018-01-26 RX ORDER — MEPERIDINE HYDROCHLORIDE 25 MG/ML
12.5 INJECTION INTRAMUSCULAR; INTRAVENOUS; SUBCUTANEOUS
Status: DISCONTINUED | OUTPATIENT
Start: 2018-01-26 | End: 2018-01-26 | Stop reason: HOSPADM

## 2018-01-26 RX ORDER — ONDANSETRON 2 MG/ML
INJECTION INTRAMUSCULAR; INTRAVENOUS AS NEEDED
Status: DISCONTINUED | OUTPATIENT
Start: 2018-01-26 | End: 2018-01-26 | Stop reason: SURG

## 2018-01-26 RX ORDER — FENTANYL CITRATE 50 UG/ML
INJECTION, SOLUTION INTRAMUSCULAR; INTRAVENOUS AS NEEDED
Status: DISCONTINUED | OUTPATIENT
Start: 2018-01-26 | End: 2018-01-26 | Stop reason: SURG

## 2018-01-26 RX ORDER — MIDAZOLAM HYDROCHLORIDE 1 MG/ML
1 INJECTION INTRAMUSCULAR; INTRAVENOUS
Status: DISCONTINUED | OUTPATIENT
Start: 2018-01-26 | End: 2018-01-26 | Stop reason: HOSPADM

## 2018-01-26 RX ORDER — SODIUM CHLORIDE 9 MG/ML
INJECTION, SOLUTION INTRAVENOUS AS NEEDED
Status: DISCONTINUED | OUTPATIENT
Start: 2018-01-26 | End: 2018-01-26 | Stop reason: HOSPADM

## 2018-01-26 RX ORDER — CLONIDINE HYDROCHLORIDE 0.1 MG/1
0.1 TABLET ORAL NIGHTLY
COMMUNITY
Start: 2018-01-25 | End: 2020-10-19

## 2018-01-26 RX ADMIN — ONDANSETRON HYDROCHLORIDE 4 MG: 2 SOLUTION INTRAMUSCULAR; INTRAVENOUS at 11:01

## 2018-01-26 RX ADMIN — EPHEDRINE SULFATE 10 MG: 50 INJECTION INTRAMUSCULAR; INTRAVENOUS; SUBCUTANEOUS at 09:32

## 2018-01-26 RX ADMIN — MIDAZOLAM HYDROCHLORIDE 2 MG: 1 INJECTION, SOLUTION INTRAMUSCULAR; INTRAVENOUS at 09:12

## 2018-01-26 RX ADMIN — ONDANSETRON 4 MG: 2 INJECTION, SOLUTION INTRAMUSCULAR; INTRAVENOUS at 12:19

## 2018-01-26 RX ADMIN — SUCCINYLCHOLINE CHLORIDE 140 MG: 20 INJECTION, SOLUTION INTRAMUSCULAR; INTRAVENOUS at 09:28

## 2018-01-26 RX ADMIN — FENTANYL CITRATE 100 MCG: 50 INJECTION, SOLUTION INTRAMUSCULAR; INTRAVENOUS at 09:28

## 2018-01-26 RX ADMIN — ROCURONIUM BROMIDE 5 MG: 10 INJECTION INTRAVENOUS at 09:28

## 2018-01-26 RX ADMIN — METOCLOPRAMIDE 10 MG: 5 INJECTION, SOLUTION INTRAMUSCULAR; INTRAVENOUS at 09:12

## 2018-01-26 RX ADMIN — ROCURONIUM BROMIDE 30 MG: 10 INJECTION INTRAVENOUS at 09:44

## 2018-01-26 RX ADMIN — FENTANYL CITRATE 50 MCG: 50 INJECTION, SOLUTION INTRAMUSCULAR; INTRAVENOUS at 11:11

## 2018-01-26 RX ADMIN — SODIUM CHLORIDE, POTASSIUM CHLORIDE, SODIUM LACTATE AND CALCIUM CHLORIDE 1000 ML: 600; 310; 30; 20 INJECTION, SOLUTION INTRAVENOUS at 08:09

## 2018-01-26 RX ADMIN — DEXAMETHASONE SODIUM PHOSPHATE 8 MG: 4 INJECTION, SOLUTION INTRAMUSCULAR; INTRAVENOUS at 10:00

## 2018-01-26 RX ADMIN — SUGAMMADEX 200 MG: 100 INJECTION, SOLUTION INTRAVENOUS at 11:01

## 2018-01-26 RX ADMIN — LIDOCAINE HYDROCHLORIDE 1 EACH: 40 SOLUTION TOPICAL at 09:28

## 2018-01-26 RX ADMIN — FENTANYL CITRATE 50 MCG: 50 INJECTION, SOLUTION INTRAMUSCULAR; INTRAVENOUS at 10:19

## 2018-01-26 RX ADMIN — EPHEDRINE SULFATE 10 MG: 50 INJECTION INTRAMUSCULAR; INTRAVENOUS; SUBCUTANEOUS at 09:37

## 2018-01-26 RX ADMIN — Medication 2 G: at 09:30

## 2018-01-26 RX ADMIN — MORPHINE SULFATE 2 MG: 2 INJECTION, SOLUTION INTRAMUSCULAR; INTRAVENOUS at 12:00

## 2018-01-26 RX ADMIN — ROCURONIUM BROMIDE 15 MG: 10 INJECTION INTRAVENOUS at 10:19

## 2018-01-26 RX ADMIN — DEXAMETHASONE SODIUM PHOSPHATE 4 MG: 4 INJECTION, SOLUTION INTRA-ARTICULAR; INTRALESIONAL; INTRAMUSCULAR; INTRAVENOUS; SOFT TISSUE at 09:12

## 2018-01-26 RX ADMIN — SODIUM CHLORIDE, POTASSIUM CHLORIDE, SODIUM LACTATE AND CALCIUM CHLORIDE 100 ML/HR: 600; 310; 30; 20 INJECTION, SOLUTION INTRAVENOUS at 11:34

## 2018-01-26 RX ADMIN — Medication 160 MCG: at 09:42

## 2018-01-26 RX ADMIN — PROPOFOL 160 MG: 10 INJECTION, EMULSION INTRAVENOUS at 09:28

## 2018-01-26 RX ADMIN — LIDOCAINE HYDROCHLORIDE 80 MG: 20 INJECTION, SOLUTION INFILTRATION; PERINEURAL at 09:28

## 2018-01-26 RX ADMIN — LIDOCAINE HYDROCHLORIDE 0.5 ML: 10 INJECTION, SOLUTION EPIDURAL; INFILTRATION; INTRACAUDAL; PERINEURAL at 08:09

## 2018-01-26 NOTE — ANESTHESIA POSTPROCEDURE EVALUATION
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]) MD Gilberto Proctor CRNA          Anesthesia Type: general  Last vitals  BP   147/79 (01/26/18 1245)   Temp   98 °F (36.7 °C) (01/26/18 1215)   Pulse   88 (01/26/18 1245)   Resp   20 (01/26/18 1227)     SpO2   93 % (01/26/18 1245)     Post Anesthesia Care and Evaluation    Patient location during evaluation: PACU  Patient participation: complete - patient participated  Level of consciousness: awake and alert  Pain management: adequate  Airway patency: patent  Anesthetic complications: No anesthetic complications  PONV Status: none  Cardiovascular status: acceptable and hemodynamically stable  Respiratory status: acceptable  Hydration status: acceptable    Comments: Blood pressure 147/79, pulse 88, temperature 98 °F (36.7 °C), temperature source Temporal Artery , resp. rate 20, SpO2 93 %.    Patient discharged from PACU based upon Chuy score. Please see RN notes for further details

## 2018-01-26 NOTE — ANESTHESIA PROCEDURE NOTES
Airway  Urgency: elective    Date/Time: 1/26/2018 9:24 AM  Airway not difficult    General Information and Staff    Patient location during procedure: OR  CRNA: ALCIRA PAYNE    Indications and Patient Condition  Indications for airway management: airway protection    Preoxygenated: yes  Mask difficulty assessment: 1 - vent by mask    Final Airway Details  Final airway type: endotracheal airway      Successful airway: ETT  Cuffed: yes   Successful intubation technique: direct laryngoscopy  Endotracheal tube insertion site: oral  Blade: Ronnell  Blade size: #3  ETT size: 7.5 mm  Cormack-Lehane Classification: grade I - full view of glottis  Placement verified by: chest auscultation and capnometry   Measured from: lips  ETT to lips (cm): 22  Number of attempts at approach: 1

## 2018-01-26 NOTE — ANESTHESIA PREPROCEDURE EVALUATION
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

## 2018-01-29 ENCOUNTER — TELEPHONE (OUTPATIENT)
Dept: SOCIAL WORK | Facility: HOSPITAL | Age: 73
End: 2018-01-29

## 2018-01-31 ENCOUNTER — TELEPHONE (OUTPATIENT)
Dept: NEUROSURGERY | Facility: CLINIC | Age: 73
End: 2018-01-31

## 2018-02-01 NOTE — TELEPHONE ENCOUNTER
Patient notified by phone that [facility] can [facility].    mel albrecht CMA

## 2018-02-05 LAB
CYTO UR: NORMAL
LAB AP CASE REPORT: NORMAL
Lab: NORMAL
Lab: NORMAL
PATH REPORT.FINAL DX SPEC: NORMAL
PATH REPORT.GROSS SPEC: NORMAL

## 2018-02-06 ENCOUNTER — OFFICE VISIT (OUTPATIENT)
Dept: NEUROSURGERY | Facility: CLINIC | Age: 73
End: 2018-02-06

## 2018-02-06 VITALS
SYSTOLIC BLOOD PRESSURE: 140 MMHG | HEIGHT: 66 IN | BODY MASS INDEX: 37.61 KG/M2 | DIASTOLIC BLOOD PRESSURE: 90 MMHG | WEIGHT: 234 LBS

## 2018-02-06 DIAGNOSIS — D36.7 BENIGN NEOPLASM OF NECK: Primary | ICD-10-CM

## 2018-02-06 DIAGNOSIS — Z78.9 NON-SMOKER: ICD-10-CM

## 2018-02-06 PROCEDURE — 99213 OFFICE O/P EST LOW 20 MIN: CPT | Performed by: NEUROLOGICAL SURGERY

## 2018-02-06 NOTE — PROGRESS NOTES
SUBJECTIVE:  Patient ID: Thania Casiano is a 72 y.o. female is here today for follow-up.    Chief Complaint: Neck mass  Chief Complaint   Patient presents with   • Neck Mass     patient had right neck mass removed on 1/26/18 and is here to discuss results; no problems from the surgery       HPI  72 -year-old female went to the operating room about 10 days ago for a removal of a mass in her right lateral neck.  She should discuss the path results.  She has no complaints.  There is some tenderness associated with the wound but right now she is doing well.    The following portions of the patient's history were reviewed and updated as appropriate: allergies, current medications, past family history, past medical history, past social history, past surgical history and problem list.    OBJECTIVE:    Review of Systems   Musculoskeletal: Positive for neck pain.   All other systems reviewed and are negative.         Physical Exam   Constitutional: She is oriented to person, place, and time. She appears well-developed and well-nourished.   HENT:   Head: Normocephalic and atraumatic.   Right Ear: Hearing normal.   Left Ear: Hearing normal.   Eyes: EOM are normal. Pupils are equal, round, and reactive to light.   Neck: Normal range of motion.   Neurological: She is alert and oriented to person, place, and time. She has normal strength and normal reflexes. No cranial nerve deficit or sensory deficit. She displays a negative Romberg sign. GCS eye subscore is 4. GCS verbal subscore is 5. GCS motor subscore is 6. She displays no Babinski's sign on the right side. She displays no Babinski's sign on the left side.   Psychiatric: Her speech is normal. Judgment normal. Cognition and memory are normal.       Neurologic Exam     Mental Status   Oriented to person, place, and time.   Speech: speech is normal     Cranial Nerves     CN III, IV, VI   Pupils are equal, round, and reactive to light.  Extraocular motions are normal.      Motor Exam     Strength   Strength 5/5 throughout.       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  We explained this diagnosis her.  Her questions and concerns were addressed.  I would reimage her in about 8 months to ensure that there is no recurrence.      1. Benign neoplasm of neck    2. BMI 35.0-35.9,adult    3. Non-smoker            No Follow-up on file.      Yoav Palencia MD

## 2018-02-06 NOTE — PATIENT INSTRUCTIONS

## 2018-02-14 RX ORDER — DEXLANSOPRAZOLE 60 MG/1
60 CAPSULE, DELAYED RELEASE ORAL DAILY
Qty: 90 CAPSULE | Refills: 0 | Status: SHIPPED | OUTPATIENT
Start: 2018-02-14 | End: 2018-03-28 | Stop reason: SDUPTHER

## 2018-03-28 ENCOUNTER — OFFICE VISIT (OUTPATIENT)
Dept: GASTROENTEROLOGY | Facility: CLINIC | Age: 73
End: 2018-03-28

## 2018-03-28 VITALS
HEIGHT: 66 IN | OXYGEN SATURATION: 99 % | SYSTOLIC BLOOD PRESSURE: 152 MMHG | DIASTOLIC BLOOD PRESSURE: 86 MMHG | HEART RATE: 77 BPM | BODY MASS INDEX: 36.96 KG/M2 | WEIGHT: 230 LBS | TEMPERATURE: 96.3 F

## 2018-03-28 DIAGNOSIS — K21.9 GASTROESOPHAGEAL REFLUX DISEASE, ESOPHAGITIS PRESENCE NOT SPECIFIED: Primary | ICD-10-CM

## 2018-03-28 PROCEDURE — 99212 OFFICE O/P EST SF 10 MIN: CPT | Performed by: NURSE PRACTITIONER

## 2018-03-28 RX ORDER — DEXLANSOPRAZOLE 60 MG/1
60 CAPSULE, DELAYED RELEASE ORAL DAILY
Qty: 90 CAPSULE | Refills: 2 | Status: SHIPPED | OUTPATIENT
Start: 2018-03-28 | End: 2019-01-17 | Stop reason: SDUPTHER

## 2018-03-28 NOTE — PROGRESS NOTES
[facility] GASTROENTEROLOGY - OFFICE NOTE    3/28/2018    Thania Casiano   1945    Primary Physician: Davon Ashley MD    Chief Complaint   Patient presents with   • Heartburn         HISTORY OF PRESENT ILLNESS    Thania Casiano is a 72 y.o. female presents  with gerd. This is stable. Takes dexilant daily.  Needs refill of dexilant.  She takes vit d daily, and calcium prn. Recent bone density test recently showed osteopenia.      Past Medical History:   Diagnosis Date   • Anxiety    • Arthritis    • Asthma    • Colon polyp    • Depression    • GERD (gastroesophageal reflux disease)    • Glaucoma    • H. pylori infection    • Hypertension    • Increased intraocular pressure    • Migraine    • Peptic ulcer    • Polymyalgia    • Polymyalgia rheumatica    • PONV (postoperative nausea and vomiting)    • Thyroid disease        Past Surgical History:   Procedure Laterality Date   • APPENDECTOMY     • BACK SURGERY     • BREAST BIOPSY     • CARPAL TUNNEL RELEASE     • CATARACT EXTRACTION Bilateral    • CERVICAL SPINAL CORD TUMOR REMOVAL Right 1/26/2018    Procedure: CERVICAL SPINAL TUMOR REMOVAL RIGHT;  Surgeon: Yoav Palencia MD;  Location: [facility] OR;  Service:    • CHOLECYSTECTOMY     • COLONOSCOPY  08/27/2015   • ENDOSCOPY  08/24/2010   • GALLBLADDER SURGERY     • HYSTERECTOMY     • TONSILLECTOMY         Outpatient Prescriptions Marked as Taking for the 3/28/18 encounter (Office Visit) with DESHAWN Dozier   Medication Sig Dispense Refill   • acetaminophen (TYLENOL) 500 MG tablet Take 500 mg by mouth.     • Albuterol Sulfate (PROAIR HFA IN) Inhale.     • celecoxib (CeleBREX) 200 MG capsule Take 200 mg by mouth.     • CloNIDine (CATAPRES) 0.1 MG tablet Take 0.1 mg by mouth Every 6 (Six) Hours As Needed for High Blood Pressure (BP >160/90).     • dexlansoprazole (DEXILANT) 60 MG capsule Take 1 capsule by mouth Daily. 90 capsule 2   • DULoxetine (CYMBALTA) 30 MG capsule      • fluticasone-salmeterol  "(ADVAIR DISKUS) 100-50 MCG/DOSE DISKUS Every 12 (Twelve) Hours.     • levothyroxine (SYNTHROID, LEVOTHROID) 100 MCG tablet Take  by mouth.     • LUMIGAN 0.01 % ophthalmic drops      • spironolactone (ALDACTONE) 25 MG tablet Take 25 mg by mouth.     • theophylline (THEODUR) 300 MG 12 hr tablet      • traZODone (DESYREL) 50 MG tablet Take 50 mg by mouth.     • vitamin D (ERGOCALCIFEROL) 65430 units capsule capsule Take  by mouth.     • [DISCONTINUED] dexlansoprazole (DEXILANT) 60 MG capsule Take 1 capsule by mouth Daily. 90 capsule 0       No Known Allergies    Social History     Social History   • Marital status:      Spouse name: N/A   • Number of children: N/A   • Years of education: N/A     Occupational History   • Not on file.     Social History Main Topics   • Smoking status: Former Smoker     Quit date: 1980   • Smokeless tobacco: Never Used   • Alcohol use 1.8 oz/week     1 Glasses of wine, 2 Cans of beer per week      Comment: occ   • Drug use: No   • Sexual activity: Defer     Other Topics Concern   • Not on file     Social History Narrative   • No narrative on file       Family History   Problem Relation Age of Onset   • Colon polyps Father    • Colon cancer Other    • Colon cancer Paternal Uncle        Review of Systems   Constitutional: Negative for chills, diaphoresis, fever and unexpected weight change.   Respiratory: Negative for cough, shortness of breath and wheezing.    Cardiovascular: Negative for chest pain and palpitations.   Gastrointestinal: Negative for abdominal distention, abdominal pain, anal bleeding, blood in stool, constipation, diarrhea, nausea, rectal pain and vomiting.        Vitals:    03/28/18 0949   BP: 152/86   BP Location: Left arm   Patient Position: Sitting   Cuff Size: Adult   Pulse: 77   Temp: 96.3 °F (35.7 °C)   SpO2: 99%   Weight: 104 kg (230 lb)   Height: 166.4 cm (65.5\")      Body mass index is 37.69 kg/m².    Physical Exam   Constitutional: She is oriented to " person, place, and time. She appears well-developed and well-nourished. No distress.   Cardiovascular: Normal rate, regular rhythm and normal heart sounds.    Pulmonary/Chest: Effort normal and breath sounds normal.   Abdominal: Soft. Bowel sounds are normal. She exhibits no distension and no mass. There is no tenderness. There is no rebound and no guarding.   Musculoskeletal: She exhibits no edema.   Neurological: She is alert and oriented to person, place, and time.   Skin: Skin is warm and dry.   Psychiatric: She has a normal mood and affect. Her behavior is normal.   Vitals reviewed.              ASSESSMENT AND PLAN    Thania was seen today for heartburn.    Diagnoses and all orders for this visit:    Gastroesophageal reflux disease, esophagitis presence not specified    Other orders  -     dexlansoprazole (DEXILANT) 60 MG capsule; Take 1 capsule by mouth Daily.      Stable. Needs refill dexilant.  Continue dexilant daily.  Strict anti reflux precautions.  Ov 1 yr.     Body mass index is 37.69 kg/m².     Return in about 1 year (around 3/28/2019).    Discussed the patient's BMI with her. BMI is above normal parameters. Follow-up plan includes:  no follow-up required.      I discussed non pharmaceutical treatment of gerd.  This includes gradually losing weight to achieve ideal body wt., elevation of the head of bed by 4-6 inches, nothing to eat or drink 3 hours prior to lying down, avoiding tight clothing, stress reduction, tobacco cessation, reduction of alcohol intake, and dietary restrictions (avoiding caffeine, coffee, fatty foods, mints, chocolate, spicy foods and tomato based sauces as much as possible).    I advised calcium with vit. D supplementation daily and why.  I discussed possible assoc. of renal disease and dementia with PPI use, although so far there has been no definitive evidence of causation.  An ideal goal, would be to stop PPI and other acid reducing medication use as soon as medically  reasonable and use non-medical treatments such as healthy life style modifications to control symptoms and disease.  Although that goal is not obtainable for all, life style changes should always be tried to see if that goal can be obtained.         DESHAWN Michaud/transcription disclaimer:  Much of this encounter note is electronic transcription/translation of spoken language to printed text.  The electronic translation of spoken language may be erroneous, or at times, nonsensical words or phrases may be inadvertently transcribed.  Although I have reviewed the note for such errors, some may still exist.

## 2018-05-08 ENCOUNTER — APPOINTMENT (OUTPATIENT)
Dept: GENERAL RADIOLOGY | Facility: HOSPITAL | Age: 73
End: 2018-05-08

## 2018-05-08 ENCOUNTER — HOSPITAL ENCOUNTER (EMERGENCY)
Facility: HOSPITAL | Age: 73
Discharge: HOME | End: 2018-05-08
Attending: EMERGENCY MEDICINE | Admitting: EMERGENCY MEDICINE

## 2018-05-08 VITALS
HEART RATE: 73 BPM | RESPIRATION RATE: 17 BRPM | SYSTOLIC BLOOD PRESSURE: 157 MMHG | WEIGHT: 228 LBS | TEMPERATURE: 98.3 F | BODY MASS INDEX: 37.99 KG/M2 | DIASTOLIC BLOOD PRESSURE: 90 MMHG | HEIGHT: 65 IN | OXYGEN SATURATION: 99 %

## 2018-05-08 DIAGNOSIS — S42.291A HUMERAL HEAD FRACTURE, RIGHT, CLOSED, INITIAL ENCOUNTER: Primary | ICD-10-CM

## 2018-05-08 PROCEDURE — 99283 EMERGENCY DEPT VISIT LOW MDM: CPT

## 2018-05-08 PROCEDURE — 71045 X-RAY EXAM CHEST 1 VIEW: CPT

## 2018-05-08 PROCEDURE — 73030 X-RAY EXAM OF SHOULDER: CPT

## 2018-05-08 PROCEDURE — 73060 X-RAY EXAM OF HUMERUS: CPT

## 2018-05-08 RX ORDER — HYDROCODONE BITARTRATE AND ACETAMINOPHEN 5; 325 MG/1; MG/1
1 TABLET ORAL EVERY 4 HOURS PRN
Qty: 15 TABLET | Refills: 0 | Status: SHIPPED | OUTPATIENT
Start: 2018-05-08 | End: 2018-12-10

## 2018-05-08 RX ORDER — HYDROCODONE BITARTRATE AND ACETAMINOPHEN 5; 325 MG/1; MG/1
1 TABLET ORAL ONCE
Status: COMPLETED | OUTPATIENT
Start: 2018-05-08 | End: 2018-05-08

## 2018-05-08 RX ADMIN — HYDROCODONE BITARTRATE AND ACETAMINOPHEN 1 TABLET: 5; 325 TABLET ORAL at 12:13

## 2018-05-08 NOTE — ED NOTES
"Pt reports she fell this morning while in a hurry to get ready. Pt states,\" I think I twisted wrong causing myself to lose balance. When I fell I put my left arm out to catch myself.\" Pt complains of left upper arm and shoulder pain.      Adela Purdy RN  05/08/18 7439    "

## 2018-05-08 NOTE — ED PROVIDER NOTES
Subjective   Patient is a 72-year-old female who presents with fall.  Patient was wearing different shoes and slipped on the wet floor.  She fell onto and out stretched left arm.  She complains of pain in her left shoulder.  Denies any elbow or wrist pain.  Denies hand pain.  She does have chronic decreased sensation of her left hand which is unchanged.  Denies any color change or other sensory change.  Her pain is worse with movements of her left shoulder.  She does have some relief with keeping arm still.  Denies any specific clavicle pain or neck pain.  Did not hit her head.  No LOC.  No anticoagulation.  She was ambulatory.  Denies any other injury.  Declined pain medication.            Review of Systems   Constitutional: Negative for fever.   HENT: Negative for sore throat.    Eyes: Negative for visual disturbance.   Respiratory: Negative for shortness of breath.    Cardiovascular: Negative for chest pain.   Gastrointestinal: Negative for abdominal pain.   Genitourinary: Negative for hematuria.   Musculoskeletal: Negative for back pain, gait problem, neck pain and neck stiffness.   Skin: Negative for rash.   Neurological: Negative for headaches.       Past Medical History:   Diagnosis Date   • Anxiety    • Arthritis    • Asthma    • Colon polyp    • Depression    • GERD (gastroesophageal reflux disease)    • Glaucoma    • H. pylori infection    • Hypertension    • Increased intraocular pressure    • Migraine    • Peptic ulcer    • Polymyalgia    • Polymyalgia rheumatica    • PONV (postoperative nausea and vomiting)    • Thyroid disease        No Known Allergies    Past Surgical History:   Procedure Laterality Date   • APPENDECTOMY     • BACK SURGERY     • BREAST BIOPSY     • CARPAL TUNNEL RELEASE     • CATARACT EXTRACTION Bilateral    • CERVICAL SPINAL CORD TUMOR REMOVAL Right 1/26/2018    Procedure: CERVICAL SPINAL TUMOR REMOVAL RIGHT;  Surgeon: Yoav Palencia MD;  Location: [facility] OR;  Service:    •  CHOLECYSTECTOMY     • COLONOSCOPY  08/27/2015   • ENDOSCOPY  08/24/2010   • GALLBLADDER SURGERY     • HYSTERECTOMY     • TONSILLECTOMY         Family History   Problem Relation Age of Onset   • Colon polyps Father    • Colon cancer Other    • Colon cancer Paternal Uncle        Social History     Social History   • Marital status:      Social History Main Topics   • Smoking status: Former Smoker     Quit date: 1980   • Smokeless tobacco: Never Used   • Alcohol use 1.8 oz/week     1 Glasses of wine, 2 Cans of beer per week      Comment: occ   • Drug use: No   • Sexual activity: Defer     Other Topics Concern   • Not on file       Lab Results (last 24 hours)     ** No results found for the last 24 hours. **          Objective   Physical Exam   Constitutional: She is oriented to person, place, and time. She appears well-nourished. No distress.   HENT:   Head: Atraumatic.   Mouth/Throat: Oropharynx is clear and moist and mucous membranes are normal.   Eyes: EOM are normal. Pupils are equal, round, and reactive to light.   Neck: Normal range of motion. Neck supple.   Cardiovascular: Normal rate, regular rhythm and normal heart sounds.  Exam reveals no gallop and no friction rub.    No murmur heard.  Pulmonary/Chest: Effort normal and breath sounds normal. No tachypnea. No respiratory distress. She has no decreased breath sounds. She has no wheezes. She has no rhonchi. She has no rales. She exhibits no mass, no tenderness and no bony tenderness.   Abdominal: Soft. There is no tenderness.   Musculoskeletal: She exhibits no edema.        Right shoulder: Normal.        Left shoulder: She exhibits decreased range of motion and bony tenderness. She exhibits no swelling, no effusion, no crepitus, no deformity, no laceration, normal pulse and normal strength.        Right elbow: Normal.       Left elbow: Normal.        Right wrist: Normal.        Left wrist: Normal.        Right hip: Normal.        Left hip: Normal.         "Cervical back: Normal.        Thoracic back: Normal.        Lumbar back: Normal.        Right hand: Normal.        Left hand: Normal. She exhibits normal two-point discrimination and normal capillary refill. Normal sensation noted. Decreased sensation is not present in the ulnar distribution, is not present in the medial redistribution and is not present in the radial distribution. Normal strength noted. She exhibits no finger abduction, no thumb/finger opposition and no wrist extension trouble.   Neurological: She is alert and oriented to person, place, and time. She has normal strength. No cranial nerve deficit or sensory deficit. GCS eye subscore is 4. GCS verbal subscore is 5. GCS motor subscore is 6.   Skin: Skin is warm and dry. Capillary refill takes less than 2 seconds. She is not diaphoretic.   Psychiatric: She has a normal mood and affect.   Nursing note and vitals reviewed.      Procedures         XR Humerus Left   Final Result      XR Shoulder 2+ View Left   Final Result      XR Chest 1 View   Final Result          /94 (BP Location: Right arm, Patient Position: Sitting)   Pulse 74   Temp 98.4 °F (36.9 °C) (Tympanic)   Resp 15   Ht 165.1 cm (65\")   Wt 103 kg (228 lb)   SpO2 99%   BMI 37.94 kg/m²     ED Course    ED Course   Comment By Time   This is a 72-year-old female with comminuted left humeral neck/head fracture.  Patient fell earlier today.  Neurovascular intact.  No dislocation noted.  No other bony injury noted. I spoke to Dr. Berumen who will follow up in the office in one day.  We will provide pain medication and a sling. Du Kelly MD 05/08 1225       Medications   HYDROcodone-acetaminophen (NORCO) 5-325 MG per tablet 1 tablet (1 tablet Oral Given 5/8/18 1213)            MDM  Number of Diagnoses or Management Options  Humeral head fracture, right, closed, initial encounter: new and requires workup     Amount and/or Complexity of Data Reviewed  Tests in the radiology " section of CPT®: ordered and reviewed  Discuss the patient with other providers: yes    Risk of Complications, Morbidity, and/or Mortality  Presenting problems: low  Diagnostic procedures: low  Management options: low    Patient Progress  Patient progress: stable      Final diagnoses:   Humeral head fracture, right, closed, initial encounter          Du Kelly MD  05/08/18 1229

## 2018-05-14 NOTE — ED NOTES
"ED Call Back Questions    1. How are you doing since leaving the Emergency Department?    Doing better but still sore.  Has already followed up.  Good ER visit.  2. Do you have any questions about your discharge instructions? No     3. Have you filled your new prescriptions yet? Yes   a. Do you have any questions about those medications? No     4. Were you able to make a follow-up appointment with the physician? Yes     5. Do you have a primary care physician? Yes   a. If No, would you like for me to set you up with one? N/A  i. If Yes, “I will have our ED  give you a call right back at this number to work with you on the best time for an appointment.”    6. We are always looking to get better at what we do. Do you have any suggestions for what we can do to be even better? No   a. If Yes, \"Thank you for sharing your concerns. I apologize. I will follow up with our manager and patient . Would you like someone to call you back?\" N/A    7. Is there anything else I can do for you? No     "

## 2018-10-16 ENCOUNTER — HOSPITAL ENCOUNTER (OUTPATIENT)
Dept: MRI IMAGING | Facility: HOSPITAL | Age: 73
Discharge: HOME | End: 2018-10-16
Attending: NEUROLOGICAL SURGERY | Admitting: NEUROLOGICAL SURGERY

## 2018-10-16 ENCOUNTER — OFFICE VISIT (OUTPATIENT)
Dept: NEUROSURGERY | Facility: CLINIC | Age: 73
End: 2018-10-16

## 2018-10-16 VITALS
SYSTOLIC BLOOD PRESSURE: 142 MMHG | HEIGHT: 65 IN | WEIGHT: 232.4 LBS | DIASTOLIC BLOOD PRESSURE: 80 MMHG | BODY MASS INDEX: 38.72 KG/M2

## 2018-10-16 DIAGNOSIS — D36.7 BENIGN NEOPLASM OF NECK: Primary | ICD-10-CM

## 2018-10-16 DIAGNOSIS — D36.7 BENIGN NEOPLASM OF NECK: ICD-10-CM

## 2018-10-16 DIAGNOSIS — Z78.9 NON-SMOKER: ICD-10-CM

## 2018-10-16 LAB — CREAT BLDA-MCNC: 1.1 MG/DL (ref 0.6–1.3)

## 2018-10-16 PROCEDURE — A9577 INJ MULTIHANCE: HCPCS | Performed by: NEUROLOGICAL SURGERY

## 2018-10-16 PROCEDURE — 0 GADOBENATE DIMEGLUMINE 529 MG/ML SOLUTION: Performed by: NEUROLOGICAL SURGERY

## 2018-10-16 PROCEDURE — 72156 MRI NECK SPINE W/O & W/DYE: CPT

## 2018-10-16 PROCEDURE — 82565 ASSAY OF CREATININE: CPT

## 2018-10-16 PROCEDURE — 99213 OFFICE O/P EST LOW 20 MIN: CPT | Performed by: NEUROLOGICAL SURGERY

## 2018-10-16 RX ADMIN — GADOBENATE DIMEGLUMINE 20 ML: 529 INJECTION, SOLUTION INTRAVENOUS at 13:30

## 2018-10-16 NOTE — PROGRESS NOTES
SUBJECTIVE:  Patient ID: Thania Casiano is a 73 y.o. female is here today for follow-up.    Chief Complaint: Neck mass  Chief Complaint   Patient presents with   • Neck Mass     patient is here for follow up with MRI Cspine; patient underwent surgery for this mass on 1/26/2018.       HPI  73-year-old female that underwent surgery for a right paraspinal neck mass on 01/26/2018.  She is here in follow-up with repeat imaging.  Her pathology was consistent with nodular fasciitis.  She has no new complaints.    The following portions of the patient's history were reviewed and updated as appropriate: allergies, current medications, past family history, past medical history, past social history, past surgical history and problem list.    OBJECTIVE:    Review of Systems   Musculoskeletal: Positive for arthralgias.   All other systems reviewed and are negative.         Physical Exam   Constitutional: She is oriented to person, place, and time. She appears well-developed and well-nourished.   HENT:   Head: Normocephalic and atraumatic.   Right Ear: Hearing normal.   Left Ear: Hearing normal.   Eyes: Pupils are equal, round, and reactive to light. EOM are normal.   Neck: Normal range of motion.   Neurological: She is alert and oriented to person, place, and time. She has normal strength and normal reflexes. No cranial nerve deficit or sensory deficit. She displays a negative Romberg sign. GCS eye subscore is 4. GCS verbal subscore is 5. GCS motor subscore is 6. She displays no Babinski's sign on the right side. She displays no Babinski's sign on the left side.   Psychiatric: Her speech is normal. Judgment normal. Cognition and memory are normal.       Neurologic Exam     Mental Status   Oriented to person, place, and time.   Speech: speech is normal     Cranial Nerves     CN III, IV, VI   Pupils are equal, round, and reactive to light.  Extraocular motions are normal.     Motor Exam     Strength   Strength 5/5 throughout.        Independent Review of Radiographic Studies:   MRI of the neck shows no evidence of recurrence or residual tumor.    ASSESSMENT/PLAN:  Siva doing very well after her surgery.  Her tumor a diagnosis was benign.  She did get a gross total resection.  We will only need to see her on an as-needed basis      1. Benign neoplasm of neck    2. Non-smoker    3. BMI 38.0-38.9,adult            Return if symptoms worsen or fail to improve.      Yoav Palencia MD

## 2018-10-16 NOTE — PATIENT INSTRUCTIONS
PATIENT TO CONTINUE TO FOLLOW UP WITH HER PRIMARY CARE PROVIDER FOR YEARLY PHYSICAL EXAMS TO ENSURE COMPLETE HEALTH MAINTENANCE        BMI for Adults  Body mass index (BMI) is a number that is calculated from a person's weight and height. In most adults, the number is used to find how much of an adult's weight is made up of fat. BMI is not as accurate as a direct measure of body fat.  How is BMI calculated?  BMI is calculated by dividing weight in kilograms by height in meters squared. It can also be calculated by dividing weight in pounds by height in inches squared, then multiplying the resulting number by 703. Charts are available to help you find your BMI quickly and easily without doing this calculation.  How is BMI interpreted?  Health care professionals use BMI charts to identify whether an adult is underweight, at a normal weight, or overweight based on the following guidelines:  · Underweight: BMI less than 18.5.  · Normal weight: BMI between 18.5 and 24.9.  · Overweight: BMI between 25 and 29.9.  · Obese: BMI of 30 and above.    BMI is usually interpreted the same for males and females.  Weight includes both fat and muscle, so someone with a muscular build, such as an athlete, may have a BMI that is higher than 24.9. In cases like these, BMI may not accurately depict body fat. To determine if excess body fat is the cause of a BMI of 25 or higher, further assessments may need to be done by a health care provider.  Why is BMI a useful tool?  BMI is used to identify a possible weight problem that may be related to a medical problem or may increase the risk for medical problems. BMI can also be used to promote changes to reach a healthy weight.  This information is not intended to replace advice given to you by your health care provider. Make sure you discuss any questions you have with your health care provider.  Document Released: 08/29/2005 Document Revised: 04/27/2017 Document Reviewed:  05/15/2015  [employer]vier Interactive Patient Education © 2018 [employer]

## 2018-12-10 ENCOUNTER — OFFICE VISIT (OUTPATIENT)
Dept: PULMONOLOGY | Facility: CLINIC | Age: 73
End: 2018-12-10

## 2018-12-10 VITALS
DIASTOLIC BLOOD PRESSURE: 80 MMHG | HEART RATE: 90 BPM | BODY MASS INDEX: 38.99 KG/M2 | OXYGEN SATURATION: 96 % | HEIGHT: 65 IN | WEIGHT: 234 LBS | SYSTOLIC BLOOD PRESSURE: 150 MMHG

## 2018-12-10 DIAGNOSIS — J45.30 MILD PERSISTENT ASTHMA WITHOUT COMPLICATION: Primary | ICD-10-CM

## 2018-12-10 DIAGNOSIS — K21.9 GASTROESOPHAGEAL REFLUX DISEASE WITHOUT ESOPHAGITIS: ICD-10-CM

## 2018-12-10 DIAGNOSIS — E66.01 CLASS 2 SEVERE OBESITY DUE TO EXCESS CALORIES WITH SERIOUS COMORBIDITY AND BODY MASS INDEX (BMI) OF 38.0 TO 38.9 IN ADULT (HCC): ICD-10-CM

## 2018-12-10 DIAGNOSIS — J45.20 MILD INTERMITTENT ASTHMA, UNSPECIFIED WHETHER COMPLICATED: ICD-10-CM

## 2018-12-10 LAB
FEV1/FVC: NORMAL %
FEV1: NORMAL LITERS
FVC VOL RESPIRATORY: NORMAL LITERS

## 2018-12-10 PROCEDURE — 99214 OFFICE O/P EST MOD 30 MIN: CPT | Performed by: NURSE PRACTITIONER

## 2018-12-10 PROCEDURE — 94010 BREATHING CAPACITY TEST: CPT | Performed by: NURSE PRACTITIONER

## 2018-12-10 RX ORDER — PHENOL 1.4 %
600 AEROSOL, SPRAY (ML) MUCOUS MEMBRANE DAILY
COMMUNITY
End: 2021-05-06 | Stop reason: SINTOL

## 2018-12-10 RX ORDER — ALBUTEROL SULFATE 90 UG/1
2 AEROSOL, METERED RESPIRATORY (INHALATION) EVERY 4 HOURS PRN
Qty: 1 INHALER | Refills: 11 | Status: SHIPPED | OUTPATIENT
Start: 2018-12-10 | End: 2019-01-09

## 2018-12-10 NOTE — PATIENT INSTRUCTIONS
Asthma, Adult  Asthma is a recurring condition in which the airways tighten and narrow. Asthma can make it difficult to breathe. It can cause coughing, wheezing, and shortness of breath. Asthma episodes, also called asthma attacks, range from minor to life-threatening. Asthma cannot be cured, but medicines and lifestyle changes can help control it.  What are the causes?  Asthma is believed to be caused by inherited (genetic) and environmental factors, but its exact cause is unknown. Asthma may be triggered by allergens, lung infections, or irritants in the air. Asthma triggers are different for each person. Common triggers include:  · Animal dander.  · Dust mites.  · Cockroaches.  · Pollen from trees or grass.  · Mold.  · Smoke.  · Air pollutants such as dust, household , hair sprays, aerosol sprays, paint fumes, strong chemicals, or strong odors.  · Cold air, weather changes, and winds (which increase molds and pollens in the air).  · Strong emotional expressions such as crying or laughing hard.  · Stress.  · Certain medicines (such as aspirin) or types of drugs (such as beta-blockers).  · Sulfites in foods and drinks. Foods and drinks that may contain sulfites include dried fruit, potato chips, and sparkling grape juice.  · Infections or inflammatory conditions such as the flu, a cold, or an inflammation of the nasal membranes (rhinitis).  · Gastroesophageal reflux disease (GERD).  · Exercise or strenuous activity.    What are the signs or symptoms?  Symptoms may occur immediately after asthma is triggered or many hours later. Symptoms include:  · Wheezing.  · Excessive nighttime or early morning coughing.  · Frequent or severe coughing with a common cold.  · Chest tightness.  · Shortness of breath.    How is this diagnosed?  The diagnosis of asthma is made by a review of your medical history and a physical exam. Tests may also be performed. These may include:  · Lung function studies. These tests show how  much air you breathe in and out.  · Allergy tests.  · Imaging tests such as X-rays.    How is this treated?  Asthma cannot be cured, but it can usually be controlled. Treatment involves identifying and avoiding your asthma triggers. It also involves medicines. There are 2 classes of medicine used for asthma treatment:  · Controller medicines. These prevent asthma symptoms from occurring. They are usually taken every day.  · Reliever or rescue medicines. These quickly relieve asthma symptoms. They are used as needed and provide short-term relief.    Your health care provider will help you create an asthma action plan. An asthma action plan is a written plan for managing and treating your asthma attacks. It includes a list of your asthma triggers and how they may be avoided. It also includes information on when medicines should be taken and when their dosage should be changed. An action plan may also involve the use of a device called a peak flow meter. A peak flow meter measures how well the lungs are working. It helps you monitor your condition.  Follow these instructions at home:  · Take medicines only as directed by your health care provider. Speak with your health care provider if you have questions about how or when to take the medicines.  · Use a peak flow meter as directed by your health care provider. Record and keep track of readings.  · Understand and use the action plan to help minimize or stop an asthma attack without needing to seek medical care.  · Control your home environment in the following ways to help prevent asthma attacks:  ? Do not smoke. Avoid being exposed to secondhand smoke.  ? Change your heating and air conditioning filter regularly.  ? Limit your use of fireplaces and wood stoves.  ? Get rid of pests (such as roaches and mice) and their droppings.  ? Throw away plants if you see mold on them.  ? Clean your floors and dust regularly. Use unscented cleaning products.  ? Try to have someone  else vacuum for you regularly. Stay out of rooms while they are being vacuumed and for a short while afterward. If you vacuum, use a dust mask from a hardware store, a double-layered or microfilter vacuum  bag, or a vacuum  with a HEPA filter.  ? Replace carpet with wood, tile, or vinyl gisela. Carpet can trap dander and dust.  ? Use allergy-proof pillows, mattress covers, and box spring covers.  ? Wash bed sheets and blankets every week in hot water and dry them in a dryer.  ? Use blankets that are made of polyester or cotton.  ? Clean bathrooms and morena with bleach. If possible, have someone repaint the walls in these rooms with mold-resistant paint. Keep out of the rooms that are being cleaned and painted.  ? Wash hands frequently.  Contact a health care provider if:  · You have wheezing, shortness of breath, or a cough even if taking medicine to prevent attacks.  · The colored mucus you cough up (sputum) is thicker than usual.  · Your sputum changes from clear or white to yellow, green, gray, or bloody.  · You have any problems that may be related to the medicines you are taking (such as a rash, itching, swelling, or trouble breathing).  · You are using a reliever medicine more than 2-3 times per week.  · Your peak flow is still at 50-79% of your personal best after following your action plan for 1 hour.  · You have a fever.  Get help right away if:  · You seem to be getting worse and are unresponsive to treatment during an asthma attack.  · You are short of breath even at rest.  · You get short of breath when doing very little physical activity.  · You have difficulty eating, drinking, or talking due to asthma symptoms.  · You develop chest pain.  · You develop a fast heartbeat.  · You have a bluish color to your lips or fingernails.  · You are light-headed, dizzy, or faint.  · Your peak flow is less than 50% of your personal best.  This information is not intended to replace advice given to  you by your health care provider. Make sure you discuss any questions you have with your health care provider.  Document Released: 12/18/2006 Document Revised: 05/31/2017 Document Reviewed: 07/17/2014  [employer] Interactive Patient Education © 2017 [employer]  Obesity, Adult  Obesity is having too much body fat. If you have a BMI of 30 or more, you are obese. BMI is a number that explains how much body fat you have. Obesity is often caused by taking in (consuming) more calories than your body uses.  Obesity can cause serious health problems. Changing your lifestyle can help to treat obesity.  Follow these instructions at home:  Eating and drinking    · Follow advice from your doctor about what to eat and drink. Your doctor may tell you to:  ? Cut down on (limit) fast foods, sweets, and processed snack foods.  ? Choose low-fat options. For example, choose low-fat milk instead of whole milk.  ? Eat 5 or more servings of fruits or vegetables every day.  ? Eat at home more often. This gives you more control over what you eat.  ? Choose healthy foods when you eat out.  ? Learn what a healthy portion size is. A portion size is the amount of a certain food that is healthy for you to eat at one time. This is different for each person.  ? Keep low-fat snacks available.  ? Avoid sugary drinks. These include soda, fruit juice, iced tea that is sweetened with sugar, and flavored milk.  ? Eat a healthy breakfast.  · Drink enough water to keep your pee (urine) clear or pale yellow.  · Do not go without eating for long periods of time (do not fast).  · Do not go on popular or trendy diets (fad diets).  Physical Activity  · Exercise often, as told by your doctor. Ask your doctor:  ? What types of exercise are safe for you.  ? How often you should exercise.  · Warm up and stretch before being active.  · Do slow stretching after being active (cool down).  · Rest between times of being active.  Lifestyle  · Limit how much time you  spend in front of your TV, computer, or video game system (be less sedentary).  · Find ways to reward yourself that do not involve food.  · Limit alcohol intake to no more than 1 drink a day for nonpregnant women and 2 drinks a day for men. One drink equals 12 oz of beer, 5 oz of wine, or 1½ oz of hard liquor.  General instructions  · Keep a weight loss journal. This can help you keep track of:  ? The food that you eat.  ? The exercise that you do.  · Take over-the-counter and prescription medicines only as told by your doctor.  · Take vitamins and supplements only as told by your doctor.  · Think about joining a support group. Your doctor may be able to help with this.  · Keep all follow-up visits as told by your doctor. This is important.  Contact a doctor if:  · You cannot meet your weight loss goal after you have changed your diet and lifestyle for 6 weeks.  This information is not intended to replace advice given to you by your health care provider. Make sure you discuss any questions you have with your health care provider.  Document Released: 03/11/2013 Document Revised: 05/25/2017 Document Reviewed: 10/05/2016  [employer] Interactive Patient Education © 2018 [employer]

## 2018-12-10 NOTE — PROGRESS NOTES
DESHAWN Goetz  [facility]   [facility]  [address] [place] [address]  Phone: [phone]  Fax: [phone]     Thania Casiano is a 73 y.o. female.   CC:   Chief Complaint   Patient presents with   • Follow-up      HPI: Thania Casiano is a pleasant 73 y.o. female. The patient is here today for follow up of asthma.  The patient is doing well from an asthma standpoint. No increasing shortness of breath, no fever, no chills, no cough with purulent sputum.  She would like her visits from now on to be as needed secondary to issues with her mother and not having time for multiple doctors appointments.  She is using Advair, Ventolin HFA, and theophylline.  The patient's PCP is Davon Ashley MD.    The following portions of the patient's history were reviewed and updated as appropriate: allergies, current medications, past family history, past medical history, past social history, past surgical history and problem list.  Past Medical History:   Diagnosis Date   • Anxiety    • Arthritis    • Asthma    • Colon polyp    • Depression    • GERD (gastroesophageal reflux disease)    • Glaucoma    • H. pylori infection    • Hypertension    • Increased intraocular pressure    • Migraine    • Peptic ulcer    • Polymyalgia (CMS/HCC)    • Polymyalgia rheumatica (CMS/HCC)    • PONV (postoperative nausea and vomiting)    • Thyroid disease      Family History   Problem Relation Age of Onset   • Colon polyps Father    • Colon cancer Other    • Colon cancer Paternal Uncle      Social History     Socioeconomic History   • Marital status:      Spouse name: Not on file   • Number of children: Not on file   • Years of education: Not on file   • Highest education level: Not on file   Social Needs   • Financial resource strain: Not on file   • Food insecurity - worry: Not on file   • Food insecurity - inability: Not on file   • Transportation needs - medical: Not on file   •  "Transportation needs - non-medical: Not on file   Occupational History   • Not on file   Tobacco Use   • Smoking status: Former Smoker     Last attempt to quit: 1980     Years since quittin.9   • Smokeless tobacco: Never Used   Substance and Sexual Activity   • Alcohol use: Yes     Alcohol/week: 1.8 oz     Types: 1 Glasses of wine, 2 Cans of beer per week     Comment: occ   • Drug use: No   • Sexual activity: Defer   Other Topics Concern   • Not on file   Social History Narrative   • Not on file     Review of Systems   Constitutional: Negative for chills and fever.   HENT: Negative for congestion.    Eyes: Negative for blurred vision.   Respiratory: Negative for cough and shortness of breath.    Cardiovascular: Negative for chest pain.   Gastrointestinal: Negative for diarrhea, nausea and vomiting.   Endocrine: Negative for cold intolerance and heat intolerance.   Genitourinary: Negative for dysuria.   Musculoskeletal: Negative for arthralgias.   Skin: Negative for rash.   Neurological: Negative for dizziness, weakness and light-headedness.   Hematological: Does not bruise/bleed easily.   Psychiatric/Behavioral: Negative for agitation. The patient is not nervous/anxious.      /80   Pulse 90   Ht 165.1 cm (65\")   Wt 106 kg (234 lb)   SpO2 96% Comment: RA  Breastfeeding? No   BMI 38.94 kg/m²   Physical Exam   Constitutional: She is oriented to person, place, and time. She appears well-developed and well-nourished. No distress. She is obese.  HENT:   Head: Normocephalic and atraumatic.   Eyes: Conjunctivae and EOM are normal. Pupils are equal, round, and reactive to light. No scleral icterus.   Neck: Normal range of motion. Neck supple.   Cardiovascular: Normal rate, regular rhythm and normal heart sounds. Exam reveals no friction rub.   No murmur heard.  Pulmonary/Chest: Effort normal and breath sounds normal. No respiratory distress. She has no wheezes. She has no rales.   Abdominal: Soft. Bowel " sounds are normal. She exhibits no distension. There is no tenderness.   Musculoskeletal: Normal range of motion. She exhibits edema (1+).   Neurological: She is alert and oriented to person, place, and time.   Skin: Skin is warm and dry.   Psychiatric: She has a normal mood and affect. Her behavior is normal. Judgment and thought content normal.   Nursing note and vitals reviewed.    Pulmonary Functions Testing Results:  FEV1   Date Value Ref Range Status   12/10/2018 85% liters Final     FVC   Date Value Ref Range Status   12/10/2018 84% liters Final     FEV1/FVC   Date Value Ref Range Status   12/10/2018 78.32% % Final     My PFT Interpretation: Spirometry is normal today.  Imaging: None today    Assessment and Plan:   Thania was seen today for follow-up.    Diagnoses and all orders for this visit:    Mild persistent asthma without complication  -     albuterol (PROAIR HFA) 108 (90 Base) MCG/ACT inhaler; Inhale 2 puffs Every 4 (Four) Hours As Needed for Wheezing or Shortness of Air for up to 30 days.  She will continue Advair, pro-air, and theophylline.  I refilled her albuterol rescue inhaler today.  She states she will continue care with her primary care provider regarding her asthma.  She does not want to keep scheduled follow-ups and only wants to follow up as needed.    Mild persistent asthma, unspecified whether complicated  -     Pulmonary Function Test; Future  -     Pulmonary Function Test    Gastroesophageal reflux disease without esophagitis  Continue PPI.    Class 2 severe obesity due to excess calories with serious comorbidity and body mass index (BMI) of 38.0 to 38.9 in adult (CMS/[facility])  Diet education was provided.    Health maintenance:   Influenza vaccine: Yes  Pneumovax 23: Yes  Prevnar 13: Yes  Patient's Body mass index is 38.94 kg/m². BMI is above normal parameters. Recommendations include: educational material.    Follow up: As needed  Denise Quan, DESHAWN  12/10/2018  3:59 PM

## 2019-01-18 RX ORDER — DEXLANSOPRAZOLE 60 MG/1
CAPSULE, DELAYED RELEASE ORAL
Qty: 90 CAPSULE | Refills: 0 | Status: SHIPPED | OUTPATIENT
Start: 2019-01-18 | End: 2019-03-25 | Stop reason: SDUPTHER

## 2019-03-25 ENCOUNTER — OFFICE VISIT (OUTPATIENT)
Dept: GASTROENTEROLOGY | Facility: CLINIC | Age: 74
End: 2019-03-25

## 2019-03-25 VITALS
HEIGHT: 66 IN | HEART RATE: 77 BPM | WEIGHT: 230 LBS | OXYGEN SATURATION: 99 % | DIASTOLIC BLOOD PRESSURE: 90 MMHG | BODY MASS INDEX: 36.96 KG/M2 | TEMPERATURE: 96.9 F | SYSTOLIC BLOOD PRESSURE: 164 MMHG

## 2019-03-25 DIAGNOSIS — Z83.71 FAMILY HISTORY OF POLYPS IN THE COLON: ICD-10-CM

## 2019-03-25 DIAGNOSIS — K21.9 GASTROESOPHAGEAL REFLUX DISEASE, ESOPHAGITIS PRESENCE NOT SPECIFIED: Primary | ICD-10-CM

## 2019-03-25 PROBLEM — Z83.719 FAMILY HISTORY OF POLYPS IN THE COLON: Status: ACTIVE | Noted: 2019-03-25

## 2019-03-25 PROCEDURE — 99213 OFFICE O/P EST LOW 20 MIN: CPT | Performed by: INTERNAL MEDICINE

## 2019-03-25 RX ORDER — DEXLANSOPRAZOLE 60 MG/1
1 CAPSULE, DELAYED RELEASE ORAL DAILY
Qty: 90 CAPSULE | Refills: 3 | Status: SHIPPED | OUTPATIENT
Start: 2019-03-25 | End: 2020-05-11 | Stop reason: CLARIF

## 2019-03-25 NOTE — PROGRESS NOTES
[facility]    Chief Complaint   Patient presents with   • Heartburn       Subjective     HPI    Thania Casiano is a 73 y.o. female who presents with a chief complaint of heartburn.    She comes in for checkup on her heartburn.  She states she is doing well on the Dexilant.  .  She is tried multiple PPIs over her lifetime.  She tried omeprazole Nexium and Protonix that she can recall.  She states none of them has worked nearly as well as the Dexilant.  She currently does have some breakthrough symptoms.  She notes if she drinks a little alcohol, eats a greasy meal or eat out late that she will have some breakthrough heartburn.  For the most part she only does that once in a while and her reflux is under control.  She also knows if she loses some weight her reflux would be better.  She does take calcium every day.  She denies any dysphasia.      Past Medical History:   Diagnosis Date   • Anxiety    • Arthritis    • Asthma    • Colon polyp    • Depression    • GERD (gastroesophageal reflux disease)    • Glaucoma    • H. 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 RINSE MOUTH AFTER USE, Disp: 1 each, Rfl: 8  •  Albuterol Sulfate (PROAIR HFA IN), Inhale., Disp: , Rfl:   •  calcium carbonate (OS-JUAN ANTONIO) 600 MG tablet, Take 600 mg by mouth Daily., Disp: , Rfl:   •  celecoxib (CeleBREX) 200 MG capsule, Take 200 mg by mouth., Disp: , Rfl:   •  Cholecalciferol (VITAMIN D PO), Take  by mouth Daily., Disp: , Rfl:   •  CloNIDine (CATAPRES) 0.1 MG tablet, Take 0.1 mg by mouth Every 6 (Six) Hours As Needed for High Blood Pressure (BP >160/90)., Disp: , Rfl:   •  dexlansoprazole (DEXILANT) 60 MG capsule, Take 1 capsule by mouth Daily., Disp: 90 capsule, Rfl: 3  •  DULoxetine (CYMBALTA) 30 MG capsule, , Disp: , Rfl:   •  levothyroxine (SYNTHROID, LEVOTHROID) 100 MCG tablet, Take  by mouth., Disp: , Rfl:   •  LUMIGAN 0.01 % ophthalmic drops, , Disp: , Rfl:   •  spironolactone (ALDACTONE) 25 MG tablet, Take 25 mg by mouth., Disp: , Rfl:   •  theophylline (THEODUR) 300 MG 12 hr tablet, , Disp: , Rfl:   •  traZODone (DESYREL) 50 MG tablet, Take 50 mg by mouth., Disp: , Rfl:   •  vitamin D (ERGOCALCIFEROL) 19203 units capsule capsule, Take  by mouth Every 7 (Seven) Days., Disp: , Rfl:     No Known Allergies    Social History     Socioeconomic History   • Marital status:      Spouse name: Not on file   • Number of children: Not on file   • Years of education: Not on file   • Highest education level: Not on file   Tobacco Use   • Smoking status: Former Smoker     Last attempt to quit:      Years since quittin.2   • Smokeless tobacco: Never Used   Substance and Sexual Activity   • Alcohol use: Yes     Alcohol/week: 1.8 oz     Types: 1 Glasses of wine, 2 Cans of beer per week     Comment: occ   • Drug use: No   • Sexual activity: Defer       Family History   Problem Relation Age of Onset   • Colon polyps Father    • Colon cancer Other    • Colon cancer Paternal Uncle    • Colon cancer Paternal Uncle        Review of Systems  General no fever chills or sweats  weight stable  Gastrointestinal: Not present-abdominal pain, constipation, diarrhea, dysphagia, hematemesis, melena, odynophagia, nausea, vomiting, hematochezia,    Objective     Vitals:    03/25/19 1408   BP: 164/90   Pulse: 77   Temp: 96.9 °F (36.1 °C)   SpO2: 99%       Physical Exam  No acute distress. Vital signs as documented. Skin warm and dry and without overt rashes. EOMI, sclera anicteric.  Neck without JVD or masses. Lungs clear to auscultation bilaterally, no rales. Heart exam notable for regular rhythm, normal sounds and absence of loud murmurs, rubs or gallops. Abdomen is soft, nontender, non distended, normal bowel sounds and without evidence of organomegaly, masses, or abdominal aortic enlargement. Extremities nonedematous, no cyanosis. Neuro alert, moves extremities.        Assessment/Plan   Problem List Items Addressed This Visit        Digestive    GERD (gastroesophageal reflux disease) - Primary    Relevant Medications    dexlansoprazole (DEXILANT) 60 MG capsule       Other    Family history of polyps in the colon    Overview     Primary relatives.  Normal colonoscopy August 2015, repeat screening colonoscopy suggested approximately August 2020                 She does have some breakthrough symptoms despite daily Dexilant but she is overall stable.  We will continue with this.  Also, I discussed non pharmaceutical treatment of gerd.  This includes gradually losing weight to achieve ideal body wt., elevation of the head of bed by 4-6 inches, nothing to eat or drink 3 hours prior to lying down, avoiding tight clothing, stress reduction, tobacco cessation, reduction of alcohol intake, and dietary restrictions (avoiding caffeine, coffee, fatty foods, mints, chocolate, spicy foods and tomato based sauces as much as possible).  I advised her to continue with her calcium with vit. D supplementation daily.     We will have her come back and see us in 1 year.  I did remind her that she will be due for a  screening colonoscopy in August 2020 approximately    Continue ongoing management by primary care provider and other specialists.     Patient's Body mass index is 37.69 kg/m². BMI is above normal parameters. Recommendations include: nutrition counseling.        EMR Dragon/transcription disclaimer:  Much of this encounter note is electronic transcription/translation of spoken language to printed text.  The electronic translation of spoken language may be erroneous, or at times, nonsensical words or phrases may be inadvertently transcribed.  Although I have reviewed the note for such errors, some may still exist.    Bryan Warner MD  2:32 PM  03/25/19

## 2019-04-08 DIAGNOSIS — J45.30 MILD PERSISTENT ASTHMA WITHOUT COMPLICATION: Primary | ICD-10-CM

## 2019-04-08 RX ORDER — ALBUTEROL SULFATE 90 UG/1
2 AEROSOL, METERED RESPIRATORY (INHALATION) EVERY 4 HOURS PRN
Qty: 1 INHALER | Refills: 11 | Status: SHIPPED | OUTPATIENT
Start: 2019-04-08 | End: 2020-12-14 | Stop reason: SDUPTHER

## 2019-04-10 RX ORDER — THEOPHYLLINE 300 MG/1
TABLET, EXTENDED RELEASE ORAL
Qty: 180 TABLET | Refills: 3 | Status: SHIPPED | OUTPATIENT
Start: 2019-04-10 | End: 2020-04-06

## 2019-08-19 ENCOUNTER — HOSPITAL ENCOUNTER (OUTPATIENT)
Dept: CT IMAGING | Facility: HOSPITAL | Age: 74
Discharge: HOME | End: 2019-08-19
Admitting: INTERNAL MEDICINE

## 2019-08-19 ENCOUNTER — TRANSCRIBE ORDERS (OUTPATIENT)
Dept: ADMINISTRATIVE | Facility: HOSPITAL | Age: 74
End: 2019-08-19

## 2019-08-19 DIAGNOSIS — R10.12 ABDOMINAL PAIN, LEFT UPPER QUADRANT: Primary | ICD-10-CM

## 2019-08-19 DIAGNOSIS — R10.12 ABDOMINAL PAIN, LEFT UPPER QUADRANT: ICD-10-CM

## 2019-08-19 LAB — CREAT BLDA-MCNC: 1 MG/DL (ref 0.6–1.3)

## 2019-08-19 PROCEDURE — 74177 CT ABD & PELVIS W/CONTRAST: CPT

## 2019-08-19 PROCEDURE — 25010000002 IOPAMIDOL 61 % SOLUTION: Performed by: INTERNAL MEDICINE

## 2019-08-19 PROCEDURE — 82565 ASSAY OF CREATININE: CPT

## 2019-08-19 RX ADMIN — IOPAMIDOL 100 ML: 612 INJECTION, SOLUTION INTRAVENOUS at 13:26

## 2019-08-19 RX ADMIN — IOPAMIDOL 50 ML: 612 INJECTION, SOLUTION INTRAVENOUS at 13:26

## 2019-09-02 ENCOUNTER — HOSPITAL ENCOUNTER (EMERGENCY)
Facility: HOSPITAL | Age: 74
Discharge: HOME | End: 2019-09-02
Attending: EMERGENCY MEDICINE | Admitting: EMERGENCY MEDICINE

## 2019-09-02 ENCOUNTER — APPOINTMENT (OUTPATIENT)
Dept: GENERAL RADIOLOGY | Facility: HOSPITAL | Age: 74
End: 2019-09-02

## 2019-09-02 VITALS
RESPIRATION RATE: 16 BRPM | TEMPERATURE: 97.8 F | BODY MASS INDEX: 37.32 KG/M2 | SYSTOLIC BLOOD PRESSURE: 170 MMHG | HEIGHT: 65 IN | WEIGHT: 224 LBS | HEART RATE: 75 BPM | OXYGEN SATURATION: 99 % | DIASTOLIC BLOOD PRESSURE: 90 MMHG

## 2019-09-02 DIAGNOSIS — S20.211A CONTUSION OF RIBS, RIGHT, INITIAL ENCOUNTER: Primary | ICD-10-CM

## 2019-09-02 PROCEDURE — 71101 X-RAY EXAM UNILAT RIBS/CHEST: CPT

## 2019-09-02 PROCEDURE — 99282 EMERGENCY DEPT VISIT SF MDM: CPT

## 2019-09-02 NOTE — ED PROVIDER NOTES
Subjective   Patient says she slipped and fell in her kitchen at home last night.  She fell forward.  She landed on her knees and thinks she twisted her ankle.  However they are bruised but okay.  She is most concerned about her right ribs.  She says also fell forward on that.  She is having increasing pain with breathing in the right lower rib cage.  She denies any abdominal pain.        History provided by:  Patient   used: No    Chest Injury   Location:  Right ribs  Quality:  Aching  Severity:  Moderate  Onset quality:  Sudden  Timing:  Constant  Progression:  Unchanged  Chronicity:  New  Associated symptoms: no abdominal pain, no congestion, no cough, no ear pain, no fever, no headaches, no loss of consciousness, no myalgias, no rash, no rhinorrhea, no shortness of breath, no sore throat, no vomiting and no wheezing        Review of Systems   Constitutional: Negative.  Negative for fever.   HENT: Negative.  Negative for congestion, ear pain, rhinorrhea and sore throat.    Respiratory: Negative.  Negative for cough, shortness of breath and wheezing.    Cardiovascular: Negative.    Gastrointestinal: Negative.  Negative for abdominal pain and vomiting.   Genitourinary: Negative.    Musculoskeletal: Negative.  Negative for myalgias.   Skin: Negative.  Negative for rash.   Neurological: Negative.  Negative for loss of consciousness and headaches.   Hematological: Negative.    Psychiatric/Behavioral: Negative.    All other systems reviewed and are negative.      Past Medical History:   Diagnosis Date   • Anxiety    • Arthritis    • Asthma    • Colon polyp    • Depression    • GERD (gastroesophageal reflux disease)    • Glaucoma    • H. pylori infection    • Hypertension    • Increased intraocular pressure    • Migraine    • Peptic ulcer    • Polymyalgia (CMS/HCC)    • Polymyalgia rheumatica (CMS/HCC)    • PONV (postoperative nausea and vomiting)    • Thyroid disease        No Known  Allergies    Past Surgical History:   Procedure Laterality Date   • APPENDECTOMY     • BACK SURGERY     • BREAST BIOPSY     • CARPAL TUNNEL RELEASE     • CATARACT EXTRACTION Bilateral    • CERVICAL SPINAL CORD TUMOR REMOVAL Right 2018    Procedure: CERVICAL SPINAL TUMOR REMOVAL RIGHT;  Surgeon: Yoav Palencia MD;  Location: [facility] OR;  Service:    • CHOLECYSTECTOMY     • COLONOSCOPY  2015   • ENDOSCOPY  2010   • GALLBLADDER SURGERY     • HYSTERECTOMY     • TONSILLECTOMY         Family History   Problem Relation Age of Onset   • Colon polyps Father    • Colon cancer Other    • Colon cancer Paternal Uncle    • Colon cancer Paternal Uncle        Social History     Socioeconomic History   • Marital status:      Spouse name: Not on file   • Number of children: Not on file   • Years of education: Not on file   • Highest education level: Not on file   Tobacco Use   • Smoking status: Former Smoker     Last attempt to quit: 1980     Years since quittin.6   • Smokeless tobacco: Never Used   Substance and Sexual Activity   • Alcohol use: Yes     Alcohol/week: 1.8 oz     Types: 1 Glasses of wine, 2 Cans of beer per week     Comment: occ   • Drug use: No   • Sexual activity: Defer       Prior to Admission medications    Medication Sig Start Date End Date Taking? Authorizing Provider   acetaminophen (TYLENOL) 500 MG tablet Take 500 mg by mouth.    ProviderMalcolm MD   ADVAIR DISKUS 100-50 MCG/DOSE DISKUS USE 1 INHALATION TWICE A DAY. RINSE MOUTH AFTER USE 3/4/19   Denise Quan APRN   albuterol sulfate HFA (PROAIR HFA) 108 (90 Base) MCG/ACT inhaler Inhale 2 puffs Every 4 (Four) Hours As Needed for Wheezing. 19   Natan Hughes MD   calcium carbonate (OS-JUAN ANTONIO) 600 MG tablet Take 600 mg by mouth Daily.    ProviderMalcolm MD   celecoxib (CeleBREX) 200 MG capsule Take 200 mg by mouth.    ProviderMalcolm MD   Cholecalciferol (VITAMIN D PO) Take  by mouth Daily.     Malcolm Crowell MD   CloNIDine (CATAPRES) 0.1 MG tablet Take 0.1 mg by mouth Every 6 (Six) Hours As Needed for High Blood Pressure (BP >160/90). 1/25/18   Malcolm Crowell MD   dexlansoprazole (DEXILANT) 60 MG capsule Take 1 capsule by mouth Daily. 3/25/19   Bryan Warner MD   DULoxetine (CYMBALTA) 30 MG capsule  9/26/16   Malcolm Crowell MD   levothyroxine (SYNTHROID, LEVOTHROID) 100 MCG tablet Take  by mouth.    Malcolm Crowell MD   LUMIGAN 0.01 % ophthalmic drops  12/14/17   Malcolm Crowell MD   spironolactone (ALDACTONE) 25 MG tablet Take 25 mg by mouth.    Malcolm Crowell MD   theophylline (THEODUR) 300 MG 12 hr tablet TAKE 1 TABLET TWICE A DAY 4/10/19   Natan Hughes MD   traZODone (DESYREL) 50 MG tablet Take 50 mg by mouth.    Malcolm Crowell MD   vitamin D (ERGOCALCIFEROL) 16379 units capsule capsule Take  by mouth Every 7 (Seven) Days.    Malcolm Crowell MD       Medications - No data to display    Vitals:    09/02/19 1354   BP: 170/82   Pulse: 77   Resp: 17   Temp: 97.8 °F (36.6 °C)   SpO2: 99%         Objective   Physical Exam   Constitutional: She is oriented to person, place, and time. She appears well-developed and well-nourished.   HENT:   Head: Normocephalic and atraumatic.   Cardiovascular: Normal rate and regular rhythm.   Pulmonary/Chest: Effort normal and breath sounds normal.   Tender to palpation along the right lower rib cage.  There is no crepitus or deformity noted this is mostly in the lateral part of the rib cage.   Abdominal: Soft. Bowel sounds are normal. There is no tenderness.   Musculoskeletal: Normal range of motion.   Has significant bruising of the right anterior knee with some lesser bruising of the left knee and lower legs.  However she has full range of motion and is fully amatory without limitation.   Neurological: She is alert and oriented to person, place, and time.   Psychiatric: She has a normal mood and affect.  Her behavior is normal.   Nursing note and vitals reviewed.      Procedures         Lab Results (last 24 hours)     ** No results found for the last 24 hours. **          XR Ribs Right With PA Chest   Final Result   No acute cardiopulmonary abnormality, no visualized rib   fractures on the right.   This report was finalized on 09/02/2019 14:54 by Dr. Gregorio Latham MD.          ED Course  ED Course as of Sep 02 1509   Mon Sep 02, 2019   1506 I told the patient the x-rays were okay.  She just bruised and sore.  She is discharged in stable condition.  [TR]      ED Course User Index  [TR] Yoav Santos Jr., MD          MDM  Number of Diagnoses or Management Options  Contusion of ribs, right, initial encounter: new and requires workup     Amount and/or Complexity of Data Reviewed  Tests in the radiology section of CPT®: ordered and reviewed    Risk of Complications, Morbidity, and/or Mortality  Presenting problems: moderate  Diagnostic procedures: moderate  Management options: moderate    Patient Progress  Patient progress: stable      Final diagnoses:   Contusion of ribs, right, initial encounter          Yoav Santos Jr., MD  09/02/19 6966

## 2019-09-05 ENCOUNTER — OFFICE VISIT (OUTPATIENT)
Dept: GASTROENTEROLOGY | Facility: CLINIC | Age: 74
End: 2019-09-05

## 2019-09-05 VITALS
TEMPERATURE: 95.8 F | BODY MASS INDEX: 36.16 KG/M2 | WEIGHT: 225 LBS | HEART RATE: 73 BPM | DIASTOLIC BLOOD PRESSURE: 90 MMHG | HEIGHT: 66 IN | OXYGEN SATURATION: 98 % | SYSTOLIC BLOOD PRESSURE: 158 MMHG

## 2019-09-05 DIAGNOSIS — R10.12 LEFT UPPER QUADRANT PAIN: Primary | ICD-10-CM

## 2019-09-05 DIAGNOSIS — Z80.0 FAMILY HX OF COLON CANCER: ICD-10-CM

## 2019-09-05 DIAGNOSIS — Z86.010 HX OF COLONIC POLYP: ICD-10-CM

## 2019-09-05 DIAGNOSIS — K59.00 CONSTIPATION, UNSPECIFIED CONSTIPATION TYPE: ICD-10-CM

## 2019-09-05 DIAGNOSIS — Z83.71 FAMILY HX COLONIC POLYPS: ICD-10-CM

## 2019-09-05 DIAGNOSIS — R11.0 NAUSEA: ICD-10-CM

## 2019-09-05 PROCEDURE — 99214 OFFICE O/P EST MOD 30 MIN: CPT | Performed by: NURSE PRACTITIONER

## 2019-09-05 NOTE — PROGRESS NOTES
[facility] GASTROENTEROLOGY - OFFICE NOTE    9/5/2019    Thania Casiano   1945    Primary Physician: Davon Ashley MD    Chief Complaint   Patient presents with   • Abdominal Pain   • Nausea   change in bowel habits.       HISTORY OF PRESENT ILLNESS:     Thania Casiano is a 74 y.o. female presents  with abdominal pain. Location luq. This is started intermittent and started 2 mo ago. This pain is a dull ache and radiated to the back. This is improving.  Eating and drinking made worse. Had associated nausea but no vomiting. ( she has chronic nausea). Having bm makes pain better.  Has been having constipation ( started 1-2 year ago after starting on clonidine).  Lost weight but gained back. Appetite was down but now better.    No fever. No rectal bleeding.     Takes celebrex daily . On dexilant daily. Only occasional etoh.       Ct abdomen/pelvis with contrast done 8-19-19   IMPRESSION:   1. No acute findings. No findings to explain left upper abdominal pain.   2. Small hiatal hernia.   3. Moderate-severe multilevel degenerative change of the lumbar spine.  This report was finalized on 08/19/2019 13:59 by Dr. Les Murray MD.      Last egd 8/2010 noting mild to mod gastritis , small bowel biopsy neg for celiac.   Last colonoscopy  8/2015 noting a small hemorrhoid, recommended recall 5 years.  She has history of collagenous colitis and colon polyp.   Paternal aunt and uncle x2  with colon cancer. Father had colon polyps.      Past Medical History:   Diagnosis Date   • Anxiety    • Arthritis    • Asthma    • Colon polyp    • Depression    • GERD (gastroesophageal reflux disease)    • Glaucoma    • H. pylori infection    • Hypertension    • Increased intraocular pressure    • Migraine    • Peptic ulcer    • Polymyalgia (CMS/HCC)    • Polymyalgia rheumatica (CMS/HCC)    • PONV (postoperative nausea and vomiting)    • Thyroid disease        Past Surgical History:   Procedure Laterality Date   •  APPENDECTOMY     • BACK SURGERY     • BREAST BIOPSY     • CARPAL TUNNEL RELEASE     • CATARACT EXTRACTION Bilateral    • CERVICAL SPINAL CORD TUMOR REMOVAL Right 1/26/2018    Procedure: CERVICAL SPINAL TUMOR REMOVAL RIGHT;  Surgeon: Yoav Palencia MD;  Location: [facility];  Service:    • CHOLECYSTECTOMY     • COLONOSCOPY  08/27/2015   • ENDOSCOPY  08/24/2010   • GALLBLADDER SURGERY     • HYSTERECTOMY     • TONSILLECTOMY         Outpatient Medications Marked as Taking for the 9/5/19 encounter (Office Visit) with Lori Arthur APRN   Medication Sig Dispense Refill   • acetaminophen (TYLENOL) 500 MG tablet Take 500 mg by mouth.     • ADVAIR DISKUS 100-50 MCG/DOSE DISKUS USE 1 INHALATION TWICE A DAY. RINSE MOUTH AFTER USE 1 each 8   • albuterol sulfate HFA (PROAIR HFA) 108 (90 Base) MCG/ACT inhaler Inhale 2 puffs Every 4 (Four) Hours As Needed for Wheezing. 1 inhaler 11   • calcium carbonate (OS-JUAN ANTONIO) 600 MG tablet Take 600 mg by mouth Daily.     • celecoxib (CeleBREX) 200 MG capsule Take 200 mg by mouth.     • Cholecalciferol (VITAMIN D PO) Take  by mouth Daily.     • CloNIDine (CATAPRES) 0.1 MG tablet Take 0.1 mg by mouth Every 6 (Six) Hours As Needed for High Blood Pressure (BP >160/90).     • dexlansoprazole (DEXILANT) 60 MG capsule Take 1 capsule by mouth Daily. 90 capsule 3   • DULoxetine (CYMBALTA) 30 MG capsule      • levothyroxine (SYNTHROID, LEVOTHROID) 100 MCG tablet Take  by mouth.     • LUMIGAN 0.01 % ophthalmic drops      • spironolactone (ALDACTONE) 25 MG tablet Take 25 mg by mouth.     • theophylline (THEODUR) 300 MG 12 hr tablet TAKE 1 TABLET TWICE A  tablet 3   • traZODone (DESYREL) 50 MG tablet Take 50 mg by mouth.     • vitamin D (ERGOCALCIFEROL) 10135 units capsule capsule Take  by mouth Every 7 (Seven) Days.         No Known Allergies    Social History     Socioeconomic History   • Marital status:      Spouse name: Not on file   • Number of children: Not on file   • Years of  "education: Not on file   • Highest education level: Not on file   Tobacco Use   • Smoking status: Former Smoker     Last attempt to quit: 1980     Years since quittin.7   • Smokeless tobacco: Never Used   Substance and Sexual Activity   • Alcohol use: Yes     Alcohol/week: 1.8 oz     Types: 1 Glasses of wine, 2 Cans of beer per week     Comment: occ   • Drug use: No   • Sexual activity: Defer       Family History   Problem Relation Age of Onset   • Colon polyps Father    • Colon cancer Other    • Colon cancer Paternal Uncle    • Colon cancer Paternal Uncle        Review of Systems   Constitutional: Negative for chills, fever and unexpected weight change.   Respiratory: Negative for cough, shortness of breath and wheezing.    Cardiovascular: Negative for chest pain and palpitations.   Gastrointestinal: Positive for abdominal pain, constipation and nausea. Negative for abdominal distention, anal bleeding, blood in stool, diarrhea and vomiting.        Vitals:    19 1301   BP: 158/90   Pulse: 73   Temp: 95.8 °F (35.4 °C)   SpO2: 98%   Weight: 102 kg (225 lb)   Height: 166.4 cm (65.5\")      Body mass index is 36.87 kg/m².    Physical Exam   Constitutional: She appears well-developed and well-nourished. No distress.   Cardiovascular: Normal rate, regular rhythm and normal heart sounds.   Pulmonary/Chest: Effort normal and breath sounds normal.   Abdominal: Soft. Bowel sounds are normal. She exhibits no distension. There is no tenderness.   Musculoskeletal: She exhibits no edema.   Neurological: She is alert.   Skin: Skin is warm and dry.   Vitals reviewed.      Results for orders placed or performed during the hospital encounter of 19   POC Creatinine   Result Value Ref Range    Creatinine 1.00 0.60 - 1.30 mg/dL           ASSESSMENT AND PLAN    Assessment/Plan     Thnaia was seen today for abdominal pain and nausea.    Diagnoses and all orders for this visit:    Left upper quadrant pain  -     Case " Request; Standing  -     Case Request    Nausea  -     Case Request; Standing  -     Case Request    Constipation, unspecified constipation type  -     Case Request; Standing  -     Case Request    Hx of colonic polyp  -     Case Request; Standing  -     Case Request    Family hx of colon cancer  -     Case Request; Standing  -     Case Request    Family hx colonic polyps  -     Case Request; Standing  -     Case Request    Other orders  -     Follow Anesthesia Guidelines / Standing Orders; Future  -     Implement Anesthesia Orders Day of Procedure; Standing  -     Obtain Informed Consent; Standing  -     Obtain Informed Consent; Future  -     Discontinue: polyethylene glycol (GOLYTELY) 236 g solution; Take 4,000 mL by mouth 1 (One) Time for 1 dose. Take as directed per instruction sheet.  -     Sod Picosulfate-Mag Ox-Cit Acd (CLENPIQ) 10-3.5-12 MG-GM -GM/160ML solution; Take 2 bottles by mouth 1 (One) Time for 1 dose. Take as directed       We discussed differential diagnosis.  Her pain is improving at this point.  She is interested in pursuing upper endoscopy so we will schedule.  She does have history of peptic ulcer disease in the past.  Would recommend that she continue taking Dexilant on a daily basis.  She will hold Celebrex 5 days prior to procedure.  Recommend emergency room if worsening symptoms.       In regards to change in bowel habits with constipation, recommend taking fiber supplement daily.  Also recommended increase water intake and exercise.  If those things do not help then recommend MiraLAX daily.  She verbalized understanding.  She is interested in pursuing a colonoscopy due to change in bowel habits.  We will schedule colonoscopy.          ESOPHAGOGASTRODUODENOSCOPY WITH ANESTHESIA (N/A), COLONOSCOPY WITH ANESTHESIA (N/A)  All risks, benefits, alternatives, and indications of colonoscopy procedure have been discussed with the patient. Risks to include perforation of the colon requiring  possible surgery or colostomy, risk of bleeding from biopsies or removal of colon tissue, possibility of missing a colon polyp or cancer, or adverse drug reaction.  Benefits to include the diagnosis and management of disease of the colon and rectum. Alternatives to include barium enema, radiographic evaluation, lab testing or no intervention. Pt verbalizes understanding and agrees.     Risk, benefits, and alternatives of endoscopy were explained in full.  They understand that there is a risk of bleeding, perforation, and infection.  The risk of perforation goes up with esophageal dilation.  Other options to evaluate UGI complaints could involve barium swallow or UGI series, but these would be diagnostic tests only.  Patient was given time to ask questions.  I answered them to their satisfaction and they are agreeable to proceeding       Body mass index is 36.87 kg/m².    Patient's Body mass index is 36.87 kg/m². BMI is above normal parameters. Recommendations include: no follow up , recommend weight loss .         DESHAWN Michaud    EMR Dragon/transcription disclaimer:  Much of this encounter note is electronic transcription/translation of spoken language to printed text.  The electronic translation of spoken language may be erroneous, or at times, nonsensical words or phrases may be inadvertently transcribed.  Although I have reviewed the note for such errors, some may still exist.

## 2019-09-06 PROBLEM — Z83.71 FAMILY HX COLONIC POLYPS: Status: ACTIVE | Noted: 2019-09-06

## 2019-09-06 PROBLEM — R11.0 NAUSEA: Status: ACTIVE | Noted: 2019-09-06

## 2019-09-06 PROBLEM — R10.12 LEFT UPPER QUADRANT PAIN: Status: ACTIVE | Noted: 2019-09-06

## 2019-09-06 PROBLEM — Z86.0100 HX OF COLONIC POLYP: Status: ACTIVE | Noted: 2019-09-06

## 2019-09-06 PROBLEM — Z83.719 FAMILY HX COLONIC POLYPS: Status: ACTIVE | Noted: 2019-09-06

## 2019-09-06 PROBLEM — Z80.0 FAMILY HX OF COLON CANCER: Status: ACTIVE | Noted: 2019-09-06

## 2019-09-06 PROBLEM — Z86.010 HX OF COLONIC POLYP: Status: ACTIVE | Noted: 2019-09-06

## 2019-09-06 PROBLEM — K59.00 CONSTIPATION: Status: ACTIVE | Noted: 2019-09-06

## 2019-09-30 ENCOUNTER — HOSPITAL ENCOUNTER (OUTPATIENT)
Facility: HOSPITAL | Age: 74
Setting detail: HOSPITAL OUTPATIENT SURGERY
Discharge: HOME | End: 2019-09-30
Attending: INTERNAL MEDICINE | Admitting: INTERNAL MEDICINE

## 2019-09-30 ENCOUNTER — ANESTHESIA (OUTPATIENT)
Dept: GASTROENTEROLOGY | Facility: HOSPITAL | Age: 74
End: 2019-09-30

## 2019-09-30 ENCOUNTER — TELEPHONE (OUTPATIENT)
Dept: GASTROENTEROLOGY | Facility: CLINIC | Age: 74
End: 2019-09-30

## 2019-09-30 ENCOUNTER — ANESTHESIA EVENT (OUTPATIENT)
Dept: GASTROENTEROLOGY | Facility: HOSPITAL | Age: 74
End: 2019-09-30

## 2019-09-30 VITALS
HEIGHT: 65 IN | HEART RATE: 68 BPM | OXYGEN SATURATION: 97 % | WEIGHT: 220 LBS | DIASTOLIC BLOOD PRESSURE: 84 MMHG | BODY MASS INDEX: 36.65 KG/M2 | SYSTOLIC BLOOD PRESSURE: 141 MMHG | TEMPERATURE: 97.8 F | RESPIRATION RATE: 16 BRPM

## 2019-09-30 DIAGNOSIS — Z83.71 FAMILY HX COLONIC POLYPS: ICD-10-CM

## 2019-09-30 DIAGNOSIS — K59.00 CONSTIPATION, UNSPECIFIED CONSTIPATION TYPE: ICD-10-CM

## 2019-09-30 DIAGNOSIS — Z80.0 FAMILY HX OF COLON CANCER: ICD-10-CM

## 2019-09-30 DIAGNOSIS — Z86.010 HX OF COLONIC POLYP: ICD-10-CM

## 2019-09-30 DIAGNOSIS — R11.0 NAUSEA: ICD-10-CM

## 2019-09-30 DIAGNOSIS — R10.12 LEFT UPPER QUADRANT PAIN: ICD-10-CM

## 2019-09-30 PROCEDURE — 25010000002 PROPOFOL 10 MG/ML EMULSION: Performed by: NURSE ANESTHETIST, CERTIFIED REGISTERED

## 2019-09-30 PROCEDURE — 45378 DIAGNOSTIC COLONOSCOPY: CPT | Performed by: INTERNAL MEDICINE

## 2019-09-30 PROCEDURE — 88305 TISSUE EXAM BY PATHOLOGIST: CPT | Performed by: INTERNAL MEDICINE

## 2019-09-30 PROCEDURE — 43239 EGD BIOPSY SINGLE/MULTIPLE: CPT | Performed by: INTERNAL MEDICINE

## 2019-09-30 PROCEDURE — 87081 CULTURE SCREEN ONLY: CPT | Performed by: INTERNAL MEDICINE

## 2019-09-30 PROCEDURE — 43251 EGD REMOVE LESION SNARE: CPT | Performed by: INTERNAL MEDICINE

## 2019-09-30 RX ORDER — ONDANSETRON 2 MG/ML
4 INJECTION INTRAMUSCULAR; INTRAVENOUS ONCE AS NEEDED
Status: DISCONTINUED | OUTPATIENT
Start: 2019-09-30 | End: 2019-09-30 | Stop reason: HOSPADM

## 2019-09-30 RX ORDER — PROPOFOL 10 MG/ML
VIAL (ML) INTRAVENOUS AS NEEDED
Status: DISCONTINUED | OUTPATIENT
Start: 2019-09-30 | End: 2019-09-30 | Stop reason: SURG

## 2019-09-30 RX ORDER — SODIUM CHLORIDE 0.9 % (FLUSH) 0.9 %
10 SYRINGE (ML) INJECTION AS NEEDED
Status: DISCONTINUED | OUTPATIENT
Start: 2019-09-30 | End: 2019-09-30 | Stop reason: HOSPADM

## 2019-09-30 RX ORDER — SODIUM CHLORIDE 9 MG/ML
500 INJECTION, SOLUTION INTRAVENOUS CONTINUOUS PRN
Status: DISCONTINUED | OUTPATIENT
Start: 2019-09-30 | End: 2019-09-30 | Stop reason: HOSPADM

## 2019-09-30 RX ADMIN — PROPOFOL 200 MG: 10 INJECTION, EMULSION INTRAVENOUS at 09:12

## 2019-09-30 RX ADMIN — SODIUM CHLORIDE 500 ML: 9 INJECTION, SOLUTION INTRAVENOUS at 09:06

## 2019-09-30 RX ADMIN — LIDOCAINE HYDROCHLORIDE 200 MG: 20 INJECTION, SOLUTION INTRAVENOUS at 09:12

## 2019-09-30 RX ADMIN — PROPOFOL 200 MG: 10 INJECTION, EMULSION INTRAVENOUS at 09:20

## 2019-09-30 NOTE — ANESTHESIA POSTPROCEDURE EVALUATION
"Patient: Thania Casiano    Procedure Summary     Date:  09/30/19 Room / Location:  [facility] ENDOSCOPY 4 / [facility] ENDOSCOPY    Anesthesia Start:  0910 Anesthesia Stop:  0950    Procedures:       ESOPHAGOGASTRODUODENOSCOPY WITH ANESTHESIA (N/A )      COLONOSCOPY WITH ANESTHESIA (N/A ) Diagnosis:       Left upper quadrant pain      Nausea      Constipation, unspecified constipation type      Hx of colonic polyp      Family hx of colon cancer      Family hx colonic polyps      (Left upper quadrant pain [R10.12])      (Nausea [R11.0])      (Constipation, unspecified constipation type [K59.00])      (Hx of colonic polyp [Z86.010])      (Family hx of colon cancer [Z80.0])      (Family hx colonic polyps [Z83.71])    Surgeon:  Bryan Warner MD Provider:  Hiram Patel CRNA    Anesthesia Type:  MAC ASA Status:  2          Anesthesia Type: MAC  Last vitals  BP   176/95 (09/30/19 0850)   Temp   97.8 °F (36.6 °C) (09/30/19 0850)   Pulse   81 (09/30/19 0850)   Resp   18 (09/30/19 0850)     SpO2   97 % (09/30/19 0850)     Post Anesthesia Care and Evaluation    Patient location during evaluation: PHASE II  Patient participation: complete - patient participated  Level of consciousness: awake and alert  Pain management: adequate  Airway patency: patent  Anesthetic complications: No anesthetic complications    Cardiovascular status: acceptable  Respiratory status: acceptable  Hydration status: acceptable    Comments: Blood pressure 176/95, pulse 81, temperature 97.8 °F (36.6 °C), temperature source Temporal, resp. rate 18, height 165.1 cm (65\"), weight 99.8 kg (220 lb), SpO2 97 %, not currently breastfeeding.    Pt discharged from PACU based on kyung score >8      "

## 2019-09-30 NOTE — ANESTHESIA PREPROCEDURE EVALUATION
Anesthesia Evaluation     Patient summary reviewed   history of anesthetic complications: PONV  NPO Solid Status: > 8 hours  NPO Liquid Status: > 2 hours           Airway   Mallampati: II  TM distance: >3 FB  Neck ROM: full  no difficulty expected  Dental    (+) upper dentures and partials    Comment: Lower partial    Pulmonary - normal exam   (+) asthma,   (-) sleep apnea, not a smoker  Cardiovascular - normal exam  Exercise tolerance: good (4-7 METS)    ECG reviewed    (+) hypertension,       Neuro/Psych  (-) seizures, TIA, CVA    ROS Comment: Glaucoma  GI/Hepatic/Renal/Endo    (+) obesity,  GERD,  hypothyroidism,   (-) morbid obesity, liver disease, no renal disease, diabetes    Musculoskeletal     (+) myalgias,   Abdominal    Substance History      OB/GYN          Other                        Anesthesia Plan    ASA 2     MAC     intravenous induction   Anesthetic plan, all risks, benefits, and alternatives have been provided, discussed and informed consent has been obtained with: patient.

## 2019-10-01 LAB
CYTO UR: NORMAL
LAB AP CASE REPORT: NORMAL
PATH REPORT.FINAL DX SPEC: NORMAL
PATH REPORT.GROSS SPEC: NORMAL
UREASE TISS QL: NEGATIVE

## 2019-12-02 ENCOUNTER — OFFICE VISIT (OUTPATIENT)
Dept: PULMONOLOGY | Facility: CLINIC | Age: 74
End: 2019-12-02

## 2019-12-02 VITALS
WEIGHT: 222 LBS | DIASTOLIC BLOOD PRESSURE: 80 MMHG | BODY MASS INDEX: 35.68 KG/M2 | HEIGHT: 66 IN | SYSTOLIC BLOOD PRESSURE: 140 MMHG | HEART RATE: 75 BPM | OXYGEN SATURATION: 98 %

## 2019-12-02 DIAGNOSIS — J45.20 MILD INTERMITTENT ASTHMA, UNSPECIFIED WHETHER COMPLICATED: Primary | ICD-10-CM

## 2019-12-02 DIAGNOSIS — E66.01 CLASS 2 SEVERE OBESITY DUE TO EXCESS CALORIES WITH SERIOUS COMORBIDITY AND BODY MASS INDEX (BMI) OF 38.0 TO 38.9 IN ADULT (HCC): ICD-10-CM

## 2019-12-02 PROCEDURE — 99213 OFFICE O/P EST LOW 20 MIN: CPT | Performed by: INTERNAL MEDICINE

## 2019-12-02 NOTE — PATIENT INSTRUCTIONS
The patient had a fall and did have some problems with chest pain related to some bruised ribs and had difficulty taking deep breath and had more problems with cough, shortness of breath, and also had some sinus congestion.  She is better now.  She did wonder about her theophylline.  I did tell her that this agent is getting harder to obtain through pharmacies and has become more expensive.  Currently her insurance is covering it well and I told her if it is helping is fine to continue it but it is not a crucial component of her asthma therapy.  She may try to go from twice a day to once a day told that was fine.  I will see her back in 6 months with a flow volume loop on return otherwise.

## 2019-12-03 NOTE — PROGRESS NOTES
Subjective   Thania Casiano is a 74 y.o. female.     Chief Complaint   Patient presents with   • mild persistent asthma without complication      No My Sticky Note on file.    History of Present Illness   The patient is overall done well from the standpoint of her asthma at present.  She had fallen however about 3 months ago and injured some ribs and had a difficult time with chest pain and difficulty in taking a deep breath and then had some sinus congestion and separately some cough.  That is all improved.  She did inquire about her theophylline therapy.  At present she is able to get it through her pharmacy and her insurance covers the cost fairly well.  I told her that is not the case in the future and we had to discontinue the off on that would be fine from my standpoint as it really is no longer a standard asthma drug.  If it does help her and she is tolerating it well that is fine but is not something she would have to take if it became either unavailable or too expensive.  She states she may go to 1 pill just in the morning as sometimes she has difficult time sleeping with the nighttime dose not so that could be a side effect of the theophylline.    Medical/Family/Social History   has a past medical history of Anxiety, Arthritis, Asthma, Colon polyp, Depression, GERD (gastroesophageal reflux disease), Glaucoma, H. pylori infection, Hypertension, Increased intraocular pressure, Migraine, Peptic ulcer, Polymyalgia (CMS/HCC), Polymyalgia rheumatica (CMS/HCC), PONV (postoperative nausea and vomiting), and Thyroid disease.   has a past surgical history that includes Carpal tunnel release; Tonsillectomy; Appendectomy; Hysterectomy; Gallbladder surgery; Back surgery; Breast biopsy; Cholecystectomy; Cataract extraction (Bilateral); Cervical Spinal Cord Tumor Removal (Right, 1/26/2018); Colonoscopy (08/27/2015); Esophagogastroduodenoscopy (08/24/2010); Esophagogastroduodenoscopy (N/A, 9/30/2019); and  Colonoscopy (N/A, 9/30/2019).  family history includes Colon cancer in her paternal uncle, paternal uncle and another family member; Colon polyps in her father.   reports that she quit smoking about 39 years ago. She has never used smokeless tobacco. She reports that she drinks alcohol. She reports that she does not use drugs.  No Known Allergies  Medications    Current Outpatient Medications:   •  acetaminophen (TYLENOL) 500 MG tablet, Take 500 mg by mouth., Disp: , Rfl:   •  ADVAIR DISKUS 100-50 MCG/DOSE DISKUS, USE 1 INHALATION TWICE A DAY (RINSE MOUTH AFTER USE), Disp: 3 each, Rfl: 3  •  albuterol sulfate HFA (PROAIR HFA) 108 (90 Base) MCG/ACT inhaler, Inhale 2 puffs Every 4 (Four) Hours As Needed for Wheezing., Disp: 1 inhaler, Rfl: 11  •  calcium carbonate (OS-JUAN ANTONIO) 600 MG tablet, Take 600 mg by mouth Daily., Disp: , Rfl:   •  celecoxib (CeleBREX) 200 MG capsule, Take 200 mg by mouth., Disp: , Rfl:   •  Cholecalciferol (VITAMIN D PO), Take  by mouth Daily., Disp: , Rfl:   •  CloNIDine (CATAPRES) 0.1 MG tablet, Take 0.1 mg by mouth Every 6 (Six) Hours As Needed for High Blood Pressure (BP >160/90)., Disp: , Rfl:   •  dexlansoprazole (DEXILANT) 60 MG capsule, Take 1 capsule by mouth Daily., Disp: 90 capsule, Rfl: 3  •  DULoxetine (CYMBALTA) 30 MG capsule, , Disp: , Rfl:   •  levothyroxine (SYNTHROID, LEVOTHROID) 100 MCG tablet, Take  by mouth., Disp: , Rfl:   •  LUMIGAN 0.01 % ophthalmic drops, , Disp: , Rfl:   •  spironolactone (ALDACTONE) 25 MG tablet, Take 25 mg by mouth., Disp: , Rfl:   •  theophylline (THEODUR) 300 MG 12 hr tablet, TAKE 1 TABLET TWICE A DAY, Disp: 180 tablet, Rfl: 3  •  traZODone (DESYREL) 50 MG tablet, Take 50 mg by mouth., Disp: , Rfl:   •  vitamin D (ERGOCALCIFEROL) 78814 units capsule capsule, Take  by mouth Every 7 (Seven) Days., Disp: , Rfl:     Review of Systems   Constitutional: Negative for chills and fever.   HENT: Negative for congestion.    Eyes: Negative for visual disturbance.  "  Respiratory: Positive for cough. Negative for shortness of breath.    Cardiovascular: Negative for chest pain.   Gastrointestinal: Negative for diarrhea, nausea and vomiting.   Genitourinary: Negative for difficulty urinating.   Musculoskeletal: Negative for arthralgias.        She had some recent problems with right rib pain related to a fall and some bruised ribs but this has improved.   Skin: Negative for rash.   Neurological: Negative for dizziness and speech difficulty.   Psychiatric/Behavioral: The patient is not nervous/anxious.      ------------------------------------  Objective   /80   Pulse 75   Ht 166.4 cm (65.5\")   Wt 101 kg (222 lb)   SpO2 98% Comment: RA  Breastfeeding? No   BMI 36.38 kg/m²   Physical Exam   Constitutional: She is oriented to person, place, and time. She appears well-developed.   She is an obese white female who appears in no acute distress.   HENT:   Head: Normocephalic and atraumatic.   Eyes: EOM are normal. Pupils are equal, round, and reactive to light.   Neck: Normal range of motion. Neck supple.   Cardiovascular: Normal rate, regular rhythm and normal heart sounds.   Pulmonary/Chest: Effort normal and breath sounds normal.   Abdominal: Soft.   Musculoskeletal: Normal range of motion.   Neurological: She is alert and oriented to person, place, and time.   Skin: Skin is warm and dry.   Psychiatric: She has a normal mood and affect.   Nursing note and vitals reviewed.            Pulmonary Functions Testing Results:  PFT Values        Some values may be hidden. Unless noted otherwise, only the newest values recorded on each date are displayed.         Old Values PFT Results 12/10/18   FVC 84%   FEV1 85%   FEV1/FVC 78.32%      Pre Drug PFT Results 12/10/18   No data to display.      Post Drug PFT Results 12/10/18   No data to display.      Other Tests PFT Results 12/10/18   No data to display.               My interpretation of PFT:   Assessment/Plan   Thania was seen " today for mild persistent asthma without complication.    Diagnoses and all orders for this visit:    Mild intermittent asthma, unspecified whether complicated    Class 2 severe obesity due to excess calories with serious comorbidity and body mass index (BMI) of 38.0 to 38.9 in adult (CMS/[facility])      Patient's Body mass index is 36.38 kg/m². BMI is above normal parameters. Recommendations include: referral to primary care.      She will continue her current regimen at this time including theophylline as long as it is available and affordable.  I will see her back in 6 months with a flow volume loop on return.

## 2020-02-03 RX ORDER — LEVOTHYROXINE SODIUM 0.1 MG/1
100 TABLET ORAL DAILY
Qty: 90 TABLET | Refills: 3 | Status: SHIPPED | OUTPATIENT
Start: 2020-02-03 | End: 2021-01-28

## 2020-02-06 RX ORDER — PANTOPRAZOLE SODIUM 40 MG/1
40 TABLET, DELAYED RELEASE ORAL DAILY
COMMUNITY
End: 2020-08-28

## 2020-02-17 RX ORDER — ERGOCALCIFEROL 1.25 MG/1
CAPSULE ORAL
Qty: 12 CAPSULE | Refills: 4 | Status: SHIPPED | OUTPATIENT
Start: 2020-02-17 | End: 2021-04-12

## 2020-03-05 RX ORDER — CELECOXIB 200 MG/1
200 CAPSULE ORAL DAILY
Qty: 90 CAPSULE | Refills: 3 | Status: SHIPPED | OUTPATIENT
Start: 2020-03-05 | End: 2021-03-01

## 2020-03-17 ENCOUNTER — TELEPHONE (OUTPATIENT)
Dept: GASTROENTEROLOGY | Facility: CLINIC | Age: 75
End: 2020-03-17

## 2020-03-17 NOTE — TELEPHONE ENCOUNTER
She was seen by Dr. Warner March 2019 and put on recall for 1 year.  If she is not having any GI problems then can be rescheduled.

## 2020-03-23 RX ORDER — DULOXETIN HYDROCHLORIDE 30 MG/1
CAPSULE, DELAYED RELEASE ORAL
Qty: 30 CAPSULE | Refills: 11 | Status: SHIPPED | OUTPATIENT
Start: 2020-03-23 | End: 2020-11-11

## 2020-04-06 RX ORDER — THEOPHYLLINE 300 MG/1
TABLET, EXTENDED RELEASE ORAL
Qty: 180 TABLET | Refills: 3 | Status: SHIPPED | OUTPATIENT
Start: 2020-04-06 | End: 2021-03-30

## 2020-05-11 ENCOUNTER — OFFICE VISIT (OUTPATIENT)
Dept: INTERNAL MEDICINE | Facility: CLINIC | Age: 75
End: 2020-05-11

## 2020-05-11 VITALS
HEIGHT: 66 IN | DIASTOLIC BLOOD PRESSURE: 98 MMHG | BODY MASS INDEX: 36 KG/M2 | WEIGHT: 224 LBS | HEART RATE: 69 BPM | TEMPERATURE: 97.9 F | OXYGEN SATURATION: 97 % | SYSTOLIC BLOOD PRESSURE: 170 MMHG

## 2020-05-11 DIAGNOSIS — E66.01 MORBID OBESITY (HCC): ICD-10-CM

## 2020-05-11 DIAGNOSIS — R73.01 IFG (IMPAIRED FASTING GLUCOSE): Primary | ICD-10-CM

## 2020-05-11 DIAGNOSIS — J45.20 MILD INTERMITTENT ASTHMA WITHOUT COMPLICATION: ICD-10-CM

## 2020-05-11 DIAGNOSIS — K21.9 GASTROESOPHAGEAL REFLUX DISEASE WITHOUT ESOPHAGITIS: ICD-10-CM

## 2020-05-11 PROBLEM — E66.3 OVERWEIGHT: Status: ACTIVE | Noted: 2020-05-11

## 2020-05-11 PROBLEM — J45.909 ASTHMA: Status: ACTIVE | Noted: 2020-05-11

## 2020-05-11 PROBLEM — R22.1 MASS OF RIGHT SIDE OF NECK: Status: ACTIVE | Noted: 2017-12-06

## 2020-05-11 LAB — HBA1C MFR BLD: 5.5 %

## 2020-05-11 PROCEDURE — 83036 HEMOGLOBIN GLYCOSYLATED A1C: CPT | Performed by: INTERNAL MEDICINE

## 2020-05-11 PROCEDURE — 99214 OFFICE O/P EST MOD 30 MIN: CPT | Performed by: INTERNAL MEDICINE

## 2020-05-11 NOTE — PROGRESS NOTES
Subjective   Thania Casiano is a 74 y.o. female.   Chief Complaint   Patient presents with   • Hypertension     6 mos. FU   • Prediabetes     A1C is 5.5   • Heartburn     Ins. will not cover Dexilant and GI gave her Protonix, but after reading pkg insert, she did not want to take it.  She was doing well without taking any medication, but ate something this week that has caused her problems.       This is a 6-month follow-up for patient with hypertension impaired fasting glucose and GERD.  Blood pressure and sugars have been okay she is not currently currently monitoring her blood glucoses at home.  Her GERD she has not been taking her Dexilant due to insurance problems and was prescribed Protonix but when she looked at the side effects she did not want to take anything.  She assures me that she has been told she does not have Turner's esophagus we did discuss the need for treating her symptoms.       The following portions of the patient's history were reviewed and updated as appropriate: allergies, current medications, past family history, past medical history, past social history, past surgical history and problem list.    Review of Systems   Constitutional: Negative for activity change, appetite change, fatigue, fever, unexpected weight gain and unexpected weight loss.   HENT: Negative for swollen glands, trouble swallowing and voice change.    Eyes: Negative for blurred vision and visual disturbance.   Respiratory: Negative for cough and shortness of breath.    Cardiovascular: Negative for chest pain, palpitations and leg swelling.   Gastrointestinal: Negative for abdominal pain, constipation, diarrhea, nausea, vomiting and indigestion.   Endocrine: Negative for cold intolerance, heat intolerance, polydipsia and polyphagia.   Genitourinary: Negative for dysuria and frequency.   Musculoskeletal: Negative for arthralgias, back pain, joint swelling and neck pain.   Skin: Negative for color change, rash  and skin lesions.   Neurological: Negative for dizziness, weakness, headache, memory problem and confusion.   Hematological: Does not bruise/bleed easily.   Psychiatric/Behavioral: Negative for agitation, hallucinations and suicidal ideas. The patient is not nervous/anxious.        Objective   Past Medical History:   Diagnosis Date   • Anxiety    • Arthritis    • Asthma    • Colon polyp    • Depression    • GERD (gastroesophageal reflux disease)    • Glaucoma    • H. 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    Procedure: CERVICAL SPINAL TUMOR REMOVAL RIGHT;  Surgeon: Yoav Palencia MD;  Location: [facility] OR;  Service:    • CHOLECYSTECTOMY     • COLONOSCOPY  08/27/2015   • COLONOSCOPY N/A 9/30/2019    Procedure: COLONOSCOPY WITH ANESTHESIA;  Surgeon: Bryan Warner MD;  Location: [facility] ENDOSCOPY;  Service: Gastroenterology   • ENDOSCOPY  08/24/2010   • ENDOSCOPY N/A 9/30/2019    Procedure: ESOPHAGOGASTRODUODENOSCOPY WITH ANESTHESIA;  Surgeon: Bryan Warner MD;  Location: [facility] ENDOSCOPY;  Service: Gastroenterology   • GALLBLADDER SURGERY     • HYSTERECTOMY     • TONSILLECTOMY          Current Outpatient Medications:   •  acetaminophen (TYLENOL) 500 MG tablet, Take 500 mg by mouth 2 (Two) Times a Day., Disp: , Rfl:   •  ADVAIR DISKUS 100-50 MCG/DOSE DISKUS, USE 1 INHALATION TWICE A DAY (RINSE MOUTH AFTER USE), Disp: 3 each, Rfl: 3  •  albuterol sulfate HFA (PROAIR HFA) 108 (90 Base) MCG/ACT inhaler, Inhale 2 puffs Every 4 (Four) Hours As Needed for Wheezing., Disp: 1 inhaler, Rfl: 11  •   calcium carbonate (OS-JUAN ANTONIO) 600 MG tablet, Take 600 mg by mouth Daily., Disp: , Rfl:   •  celecoxib (CeleBREX) 200 MG capsule, Take 1 capsule by mouth Daily. Take with food if GI upset occurs., Disp: 90 capsule, Rfl: 3  •  CloNIDine (CATAPRES) 0.1 MG tablet, Take 0.1 mg by mouth Every Night., Disp: , Rfl:   •  DULoxetine (CYMBALTA) 30 MG capsule, TAKE 1 CAPSULE BY MOUTH DAILY. BLUE POINT BRAND ONLY, Disp: 30 capsule, Rfl: 11  •  levothyroxine (SYNTHROID, LEVOTHROID) 100 MCG tablet, Take 1 tablet by mouth Daily. Take on empty stomach., Disp: 90 tablet, Rfl: 3  •  LUMIGAN 0.01 % ophthalmic drops, , Disp: , Rfl:   •  spironolactone (ALDACTONE) 25 MG tablet, Take 25 mg by mouth Daily., Disp: , Rfl:   •  theophylline (THEODUR) 300 MG 12 hr tablet, TAKE 1 TABLET TWICE A DAY, Disp: 180 tablet, Rfl: 3  •  traZODone (DESYREL) 50 MG tablet, Take 50 mg by mouth Every Night., Disp: , Rfl:   •  vitamin D (ERGOCALCIFEROL) 1.25 MG (83623 UT) capsule capsule, TAKE 1 CAPSULE EVERY WEEK, Disp: 12 capsule, Rfl: 4  •  pantoprazole (PROTONIX) 40 MG EC tablet, Take 40 mg by mouth Daily., Disp: , Rfl:      Vitals:    05/11/20 1046   BP: 170/98   Pulse: 69   Temp: 97.9 °F (36.6 °C)   SpO2: 97%         05/11/20  1046   Weight: 102 kg (224 lb)     Patient's Body mass index is 36.71 kg/m². BMI is above normal parameters. Recommendations include: exercise counseling and nutrition counseling.      Physical Exam   Constitutional: She is oriented to person, place, and time. She appears well-developed and well-nourished.   Obesity   HENT:   Head: Normocephalic and atraumatic.   Right Ear: External ear normal.   Left Ear: External ear normal.   Nose: Nose normal.   Mouth/Throat: Oropharynx is clear and moist.   Eyes: Pupils are equal, round, and reactive to light. Conjunctivae and EOM are normal.   Neck: Normal range of motion. Neck supple. No thyromegaly present.   Cardiovascular: Normal rate, regular rhythm, normal heart sounds and intact distal  pulses.   Pulmonary/Chest: Effort normal and breath sounds normal.   Abdominal: Soft. Bowel sounds are normal.   Lymphadenopathy:     She has no cervical adenopathy.   Neurological: She is alert and oriented to person, place, and time.   Skin: Skin is warm and dry.   Psychiatric: She has a normal mood and affect. Her behavior is normal. Thought content normal.   Nursing note and vitals reviewed.            Assessment/Plan   Diagnoses and all orders for this visit:    1. IFG (impaired fasting glucose) (Primary)  -     POCT glycated hemoglobin, total  -     Basic Metabolic Panel    2. Gastroesophageal reflux disease without esophagitis    3. Morbid obesity (CMS/[facility])    4. Mild intermittent asthma without complication      Patient's A1c is less than 6 she remains in the impaired fasting glucose category.  Her GERD is controlled with diet.  She understands the need for weight loss.  Her asthma is intermittent and she is not currently having problems.  A BMP is ordered.  Her blood pressure is not under good control

## 2020-05-12 LAB
BUN SERPL-MCNC: 16 MG/DL (ref 8–23)
BUN/CREAT SERPL: 18.6 (ref 7–25)
CALCIUM SERPL-MCNC: 9 MG/DL (ref 8.6–10.5)
CHLORIDE SERPL-SCNC: 102 MMOL/L (ref 98–107)
CO2 SERPL-SCNC: 28.2 MMOL/L (ref 22–29)
CREAT SERPL-MCNC: 0.86 MG/DL (ref 0.57–1)
GLUCOSE SERPL-MCNC: 125 MG/DL (ref 65–99)
POTASSIUM SERPL-SCNC: 3.5 MMOL/L (ref 3.5–5.2)
SODIUM SERPL-SCNC: 143 MMOL/L (ref 136–145)

## 2020-05-21 ENCOUNTER — OFFICE VISIT (OUTPATIENT)
Dept: GASTROENTEROLOGY | Facility: CLINIC | Age: 75
End: 2020-05-21

## 2020-05-21 VITALS
BODY MASS INDEX: 35.84 KG/M2 | WEIGHT: 223 LBS | TEMPERATURE: 96.9 F | OXYGEN SATURATION: 99 % | HEART RATE: 80 BPM | HEIGHT: 66 IN | SYSTOLIC BLOOD PRESSURE: 160 MMHG | DIASTOLIC BLOOD PRESSURE: 78 MMHG

## 2020-05-21 DIAGNOSIS — K21.9 GASTROESOPHAGEAL REFLUX DISEASE WITHOUT ESOPHAGITIS: Primary | ICD-10-CM

## 2020-05-21 DIAGNOSIS — Z83.71 FAMILY HX COLONIC POLYPS: ICD-10-CM

## 2020-05-21 PROCEDURE — 99213 OFFICE O/P EST LOW 20 MIN: CPT | Performed by: INTERNAL MEDICINE

## 2020-05-21 NOTE — PROGRESS NOTES
[facility] Gastroenterology    Chief Complaint   Patient presents with   • Heartburn       Subjective     HPI    Thania Casiano is a 74 y.o. female who presents with a chief complaint of reflux.    She has a history of reflux disease which we see her for yearly.  She was last seen for that in March 2019.  She did come see his last fall when she was having some left upper quadrant discomfort.  She was also due for colonoscopy exam with a family history of colon polyps and multiple second-degree relatives.  Upper endoscopy revealed a couple benign hyperplastic polyps.  Colonoscopy was relatively unremarkable and was recommended she undergo screening again in 5 years based on family history.    She tells me she now is not having any reflux symptoms.  She did run out of Dexilant.  She went a couple months without any medications.  She states during the coronavirus crisis she was really good.  She watch what she ate.  She did well.  She would have to take a Tums every once while in the work well for her.  She did get back on Protonix and she is asymptomatic now.  She denies any dysphasia or heartburn.  She still will take a Tums once in a while.  She did gain some weight while she was isolating from the coronavirus she is up about 2 pounds she states.  Everything else is going well.    Past Medical History:   Diagnosis Date   • Anxiety    • Arthritis    • Asthma    • Depression    • GERD (gastroesophageal reflux disease)    • Glaucoma    • H. pylori infection    • Hypertension    • Increased intraocular pressure    • Migraine    • Peptic ulcer    • Polymyalgia (CMS/HCC)    • Polymyalgia rheumatica (CMS/HCC)    • PONV (postoperative nausea and vomiting)    • Thyroid disease        Past Surgical History:   Procedure Laterality Date   • APPENDECTOMY     • BACK SURGERY     • BREAST BIOPSY     • CARPAL TUNNEL RELEASE     • CATARACT EXTRACTION Bilateral    • CERVICAL SPINAL CORD TUMOR REMOVAL Right 1/26/2018     Procedure: CERVICAL SPINAL TUMOR REMOVAL RIGHT;  Surgeon: Yoav Palencia MD;  Location: [facility] OR;  Service:    • CHOLECYSTECTOMY     • COLONOSCOPY  08/27/2015   • COLONOSCOPY N/A 9/30/2019    Procedure: COLONOSCOPY WITH ANESTHESIA;  Surgeon: Bryan Warner MD;  Location: [facility] ENDOSCOPY;  Service: Gastroenterology   • ENDOSCOPY  08/24/2010   • ENDOSCOPY N/A 9/30/2019    Procedure: ESOPHAGOGASTRODUODENOSCOPY WITH ANESTHESIA;  Surgeon: Bryan Warner MD;  Location: [facility] ENDOSCOPY;  Service: Gastroenterology   • GALLBLADDER SURGERY     • HYSTERECTOMY     • TONSILLECTOMY           Current Outpatient Medications:   •  acetaminophen (TYLENOL) 500 MG tablet, Take 500 mg by mouth 2 (Two) Times a Day., Disp: , Rfl:   •  ADVAIR DISKUS 100-50 MCG/DOSE DISKUS, USE 1 INHALATION TWICE A DAY (RINSE MOUTH AFTER USE), Disp: 3 each, Rfl: 3  •  albuterol sulfate HFA (PROAIR HFA) 108 (90 Base) MCG/ACT inhaler, Inhale 2 puffs Every 4 (Four) Hours As Needed for Wheezing., Disp: 1 inhaler, Rfl: 11  •  calcium carbonate (OS-JUAN ANTONIO) 600 MG tablet, Take 600 mg by mouth Daily., Disp: , Rfl:   •  celecoxib (CeleBREX) 200 MG capsule, Take 1 capsule by mouth Daily. Take with food if GI upset occurs., Disp: 90 capsule, Rfl: 3  •  CloNIDine (CATAPRES) 0.1 MG tablet, Take 0.1 mg by mouth Every Night., Disp: , Rfl:   •  DULoxetine (CYMBALTA) 30 MG capsule, TAKE 1 CAPSULE BY MOUTH DAILY. BLUE POINT BRAND ONLY, Disp: 30 capsule, Rfl: 11  •  levothyroxine (SYNTHROID, LEVOTHROID) 100 MCG tablet, Take 1 tablet by mouth Daily. Take on empty stomach., Disp: 90 tablet, Rfl: 3  •  LUMIGAN 0.01 % ophthalmic drops, , Disp: , Rfl:   •  pantoprazole (PROTONIX) 40 MG EC tablet, Take 40 mg by mouth Daily., Disp: , Rfl:   •  spironolactone (ALDACTONE) 25 MG tablet, Take 25 mg by mouth Daily., Disp: , Rfl:   •  theophylline (THEODUR) 300 MG 12 hr tablet, TAKE 1 TABLET TWICE A DAY, Disp: 180 tablet, Rfl: 3  •  traZODone (DESYREL) 50 MG tablet, Take 50 mg  by mouth Every Night., Disp: , Rfl:   •  vitamin D (ERGOCALCIFEROL) 1.25 MG (18256 UT) capsule capsule, TAKE 1 CAPSULE EVERY WEEK, Disp: 12 capsule, Rfl: 4    No Known Allergies    Social History     Socioeconomic History   • Marital status:      Spouse name: Not on file   • Number of children: Not on file   • Years of education: Not on file   • Highest education level: Not on file   Tobacco Use   • Smoking status: Former Smoker     Last attempt to quit: 1980     Years since quittin.4   • Smokeless tobacco: Never Used   Substance and Sexual Activity   • Alcohol use: Yes     Comment: rare   • Drug use: No   • Sexual activity: Defer       Family History   Problem Relation Age of Onset   • Colon polyps Father    • Colon cancer Other    • Colon cancer Paternal Uncle    • Colon cancer Paternal Uncle        Review of Systems  General no fever chills or sweats weight stable  Gastrointestinal: Not present-abdominal pain, constipation, diarrhea, dysphagia, hematemesis, melena, odynophagia, nausea, vomiting, pyrosis, regurgitation, hematochezia,    Objective     Vitals:    20 1427   BP: 160/78   Pulse: 80   Temp: 96.9 °F (36.1 °C)   SpO2: 99%       Physical Exam   Constitutional: She appears well-developed and well-nourished.   Cardiovascular: Normal rate and regular rhythm.   Abdominal: Soft. Bowel sounds are normal. She exhibits no distension. There is no tenderness.   Psychiatric: She has a normal mood and affect. Thought content normal.   Vitals reviewed.            Assessment/Plan   Problem List Items Addressed This Visit        Digestive    GERD (gastroesophageal reflux disease) - Primary       Other    Family hx colonic polyps    Overview     Added automatically from request for surgery [id number]                 Regarding her reflux she is relatively asymptomatic now and she was when she was off PPI therapy.  We had a long discussion about getting off PPI therapy and going back on strict reflux  precautions.  She was practicing these when she ran out of the medicine.  As we discussed it would be better and healthier to practice reflux precautions and to continue to take your medication eat poorly.  She expressed good understanding.  She wants to try to get off Protonix.  She is going to wean off by taking it every other day for couple weeks and try to stop.  I did advise over-the-counter Pepcid Complete to be used as directed as needed for breakthrough reflux symptoms.  If she has significant symptoms that occur despite the PRN Pepcid and reflux precautions, then she can get back on Protonix.  She expressed good understanding and agreed.    I discussed non pharmaceutical treatment of gerd.  This includes gradually losing weight to achieve ideal body wt., elevation of the head of bed by 4-6 inches, nothing to eat or drink 3 hours prior to lying down, avoiding tight clothing, stress reduction, tobacco cessation, reduction of alcohol intake, and dietary restrictions (avoiding caffeine, coffee, fatty foods, mints, chocolate, spicy foods and tomato based sauces as much as possible).    She will come back and see us as needed.    Continue ongoing management by primary care provider and other specialists.     Patient's Body mass index is 36.54 kg/m². BMI is above normal parameters. Recommendations include: nutrition counseling.        EMR Dragon/transcription disclaimer:  Much of this encounter note is electronic transcription/translation of spoken language to printed text.  The electronic translation of spoken language may be erroneous, or at times, nonsensical words or phrases may be inadvertently transcribed.  Although I have reviewed the note for such errors, some may still exist.    Bryan Warner MD  15:03  05/21/20

## 2020-06-05 ENCOUNTER — OFFICE VISIT (OUTPATIENT)
Dept: PULMONOLOGY | Facility: CLINIC | Age: 75
End: 2020-06-05

## 2020-06-05 VITALS
TEMPERATURE: 97.7 F | DIASTOLIC BLOOD PRESSURE: 80 MMHG | WEIGHT: 223.4 LBS | SYSTOLIC BLOOD PRESSURE: 130 MMHG | HEART RATE: 79 BPM | OXYGEN SATURATION: 98 % | BODY MASS INDEX: 36.61 KG/M2

## 2020-06-05 DIAGNOSIS — E66.3 OVERWEIGHT: ICD-10-CM

## 2020-06-05 DIAGNOSIS — J45.30 MILD PERSISTENT ASTHMA WITHOUT COMPLICATION: Primary | ICD-10-CM

## 2020-06-05 PROCEDURE — 99213 OFFICE O/P EST LOW 20 MIN: CPT | Performed by: INTERNAL MEDICINE

## 2020-06-05 NOTE — PROGRESS NOTES
Subjective   Thania Casiano is a 75 y.o. female.     Chief Complaint   Patient presents with   • Asthma      No My Sticky Note on file.    History of Present Illness   This visit was a face-to-face visit with both the patient myself wearing a mask and with strict hand hygiene measures being observed.  The patient is done reasonably well from the standpoint of her asthma though she has a difficult time wearing her mask particular is very hot and humid.  I told her we could get PFTs at the hospital as were not doing them at the office now because of the COVID-19 conditions but that will require pretesting for COVID and at least a few days of self quarantine.  As she is reasonably stable and I think it is crucial to do so she is in agreement.  We will plan on a follow-up in several months and hopefully we can do a flow volume loop in the office at that time.  Otherwise she is doing reasonably well and rarely has to use her rescue inhaler but occasionally does at night.  She is still on theophylline and states she is try to go off it at times but seems to do better on it.  Told her as long she is having no significant side effects that is fine with me for her to continue it.  I did E prescribe refills of her Advair Diskus a 3-month supply with 3 refills.    Medical/Family/Social History   has a past medical history of Anxiety, Arthritis, Asthma, Depression, GERD (gastroesophageal reflux disease), Glaucoma, H. pylori infection, Hypertension, Increased intraocular pressure, Migraine, Peptic ulcer, Polymyalgia (CMS/HCC), Polymyalgia rheumatica (CMS/HCC), PONV (postoperative nausea and vomiting), and Thyroid disease.   has a past surgical history that includes Carpal tunnel release; Tonsillectomy; Appendectomy; Hysterectomy; Gallbladder surgery; Back surgery; Breast biopsy; Cholecystectomy; Cataract extraction (Bilateral); Cervical Spinal Cord Tumor Removal (Right, 1/26/2018); Colonoscopy (08/27/2015);  Esophagogastroduodenoscopy (08/24/2010); Esophagogastroduodenoscopy (N/A, 9/30/2019); and Colonoscopy (N/A, 9/30/2019).  family history includes Colon cancer in her paternal uncle, paternal uncle and another family member; Colon polyps in her father.   reports that she quit smoking about 40 years ago. She has never used smokeless tobacco. She reports that she drinks alcohol. She reports that she does not use drugs.  No Known Allergies  Medications    Current Outpatient Medications:   •  acetaminophen (TYLENOL) 500 MG tablet, Take 500 mg by mouth 2 (Two) Times a Day., Disp: , Rfl:   •  albuterol sulfate HFA (PROAIR HFA) 108 (90 Base) MCG/ACT inhaler, Inhale 2 puffs Every 4 (Four) Hours As Needed for Wheezing., Disp: 1 inhaler, Rfl: 11  •  calcium carbonate (OS-JUAN ANTONIO) 600 MG tablet, Take 600 mg by mouth Daily., Disp: , Rfl:   •  celecoxib (CeleBREX) 200 MG capsule, Take 1 capsule by mouth Daily. Take with food if GI upset occurs., Disp: 90 capsule, Rfl: 3  •  CloNIDine (CATAPRES) 0.1 MG tablet, Take 0.1 mg by mouth Every Night., Disp: , Rfl:   •  DULoxetine (CYMBALTA) 30 MG capsule, TAKE 1 CAPSULE BY MOUTH DAILY. BLUE POINT BRAND ONLY, Disp: 30 capsule, Rfl: 11  •  fluticasone-salmeterol (Advair Diskus) 100-50 MCG/DOSE DISKUS, Inhale 1 puff 2 (Two) Times a Day. Rinse and spit after using., Disp: 3 each, Rfl: 3  •  levothyroxine (SYNTHROID, LEVOTHROID) 100 MCG tablet, Take 1 tablet by mouth Daily. Take on empty stomach., Disp: 90 tablet, Rfl: 3  •  LUMIGAN 0.01 % ophthalmic drops, , Disp: , Rfl:   •  spironolactone (ALDACTONE) 25 MG tablet, Take 25 mg by mouth Daily., Disp: , Rfl:   •  theophylline (THEODUR) 300 MG 12 hr tablet, TAKE 1 TABLET TWICE A DAY, Disp: 180 tablet, Rfl: 3  •  traZODone (DESYREL) 50 MG tablet, Take 50 mg by mouth Every Night., Disp: , Rfl:   •  vitamin D (ERGOCALCIFEROL) 1.25 MG (16625 UT) capsule capsule, TAKE 1 CAPSULE EVERY WEEK, Disp: 12 capsule, Rfl: 4  •  pantoprazole (PROTONIX) 40 MG EC  tablet, Take 40 mg by mouth Daily., Disp: , Rfl:     Review of Systems   Constitutional: Negative for chills and fever.   HENT: Negative for congestion.    Eyes: Negative for visual disturbance.   Respiratory: Positive for cough. Negative for shortness of breath.    Cardiovascular: Negative for chest pain.   Gastrointestinal: Negative for diarrhea, nausea and vomiting.   Genitourinary: Negative for difficulty urinating.   Musculoskeletal: Negative for arthralgias.   Skin: Negative for rash.   Neurological: Negative for dizziness and speech difficulty.   Psychiatric/Behavioral: The patient is not nervous/anxious.      ------------------------------------  Objective   /80   Pulse 79   Temp 97.7 °F (36.5 °C)   Wt 101 kg (223 lb 6.4 oz)   SpO2 98% Comment: jenny  BMI 36.61 kg/m²   Physical Exam   Constitutional: She is oriented to person, place, and time. She appears well-developed.   She is an overweight white female who appears in no acute distress.   HENT:   Head: Normocephalic and atraumatic.   She is wearing a mask.   Eyes: Pupils are equal, round, and reactive to light. EOM are normal.   Neck: Normal range of motion. Neck supple.   Cardiovascular: Normal rate, regular rhythm and normal heart sounds.   Pulmonary/Chest: Effort normal and breath sounds normal.   Abdominal: Soft.   Musculoskeletal: Normal range of motion.   Neurological: She is alert and oriented to person, place, and time.   Skin: Skin is warm and dry.   Psychiatric: She has a normal mood and affect.   Nursing note and vitals reviewed.              Pulmonary Functions Testing Results:  PFT Values        Some values may be hidden. Unless noted otherwise, only the newest values recorded on each date are displayed.         Old Values PFT Results 12/10/18   FVC 84%   FEV1 85%   FEV1/FVC 78.32%      Pre Drug PFT Results 12/10/18   No data to display.      Post Drug PFT Results 12/10/18   No data to display.      Other Tests PFT Results 12/10/18    No data to display.               Assessment/Plan   Thania was seen today for asthma.    Diagnoses and all orders for this visit:    Mild persistent asthma without complication  -     fluticasone-salmeterol (Advair Diskus) 100-50 MCG/DOSE DISKUS; Inhale 1 puff 2 (Two) Times a Day. Rinse and spit after using.    Overweight      Patient's Body mass index is 36.61 kg/m². BMI is above normal parameters. Recommendations include: referral to primary care.      I did E prescribe refills of her Advair and will plan on a follow-up visit in several months and hopefully we can do a flow volume loop in the office at that time.

## 2020-06-05 NOTE — PATIENT INSTRUCTIONS
The patient overall is doing reasonably well from the standpoint of her asthma.  He does occasionally to use her rescue inhaler in the evening.  She is still on theophylline but may try to wean herself off this.  However it does seem provide some benefit.  I will see her back in about 5 months and hopefully will be able to do a flow volume loop here in the office.  I will refill her Advair Diskus through [facility] a 3-month supply with 3 refills.

## 2020-06-25 RX ORDER — SPIRONOLACTONE 25 MG/1
TABLET ORAL
Qty: 90 TABLET | Refills: 3 | Status: SHIPPED | OUTPATIENT
Start: 2020-06-25 | End: 2021-06-01

## 2020-08-28 ENCOUNTER — OFFICE VISIT (OUTPATIENT)
Dept: INTERNAL MEDICINE | Facility: CLINIC | Age: 75
End: 2020-08-28

## 2020-08-28 VITALS
HEART RATE: 82 BPM | DIASTOLIC BLOOD PRESSURE: 70 MMHG | SYSTOLIC BLOOD PRESSURE: 130 MMHG | HEIGHT: 66 IN | BODY MASS INDEX: 36.42 KG/M2 | WEIGHT: 226.6 LBS | TEMPERATURE: 98.9 F | OXYGEN SATURATION: 97 %

## 2020-08-28 DIAGNOSIS — E03.9 HYPOTHYROIDISM (ACQUIRED): Primary | ICD-10-CM

## 2020-08-28 DIAGNOSIS — R53.83 FATIGUE, UNSPECIFIED TYPE: ICD-10-CM

## 2020-08-28 DIAGNOSIS — R07.9 CHEST PAIN, UNSPECIFIED TYPE: ICD-10-CM

## 2020-08-28 DIAGNOSIS — E55.9 VITAMIN D DEFICIENCY: ICD-10-CM

## 2020-08-28 PROCEDURE — 93000 ELECTROCARDIOGRAM COMPLETE: CPT | Performed by: NURSE PRACTITIONER

## 2020-08-28 PROCEDURE — 99214 OFFICE O/P EST MOD 30 MIN: CPT | Performed by: NURSE PRACTITIONER

## 2020-08-28 NOTE — PROGRESS NOTES
"Subjective   Thania Casiano is a 75 y.o. female.   Chief Complaint   Patient presents with   • Fatigue   • Nail Problem     brittle hair relates to thyroid issues       Thania presents today for complaints of feeling exhausted.  She states lately she has trouble even getting out of bed in the morning.  She complaints of feeling cold, having brittle nails, and hair breaking.  She has a history of hypothyroidism and has had to have adjustments to her Levothyroxine in the past.  She continues on 100mcg at this time and states she has been on this for awhile now.  She is compliant with her medication and takes on an empty stomach first thing in the morning.  She reports having one episodes of chest pain while lying in bed.  She states the pain was to the left side of her chest, without shortness of breath, that resolved on its own and she eventually felt asleep.  She believes it may have lasted about 15 minutes.  She does have a history of left shoulder injury and chronic pain to the shoulder and spine.  She denies any chest discomfort outside of this one incident.  She does have some occasional dizziness which she attributes to sinus and allergy issues.         The following portions of the patient's history were reviewed and updated as appropriate: allergies, current medications, past family history, past medical history, past social history, past surgical history and problem list.    Review of Systems   Constitutional: Positive for fatigue (\"Exhausted\"). Negative for activity change, appetite change, fever, unexpected weight gain and unexpected weight loss.   HENT: Negative for swollen glands, trouble swallowing and voice change.    Eyes: Negative for blurred vision and visual disturbance.   Respiratory: Negative for cough and shortness of breath.    Cardiovascular: Negative for chest pain, palpitations and leg swelling.   Gastrointestinal: Negative for abdominal pain, constipation, diarrhea, nausea, " vomiting and indigestion.   Endocrine: Positive for cold intolerance. Negative for heat intolerance, polydipsia and polyphagia.        Brittle Hair, Brittle Nails   Genitourinary: Negative for dysuria and frequency.   Musculoskeletal: Negative for arthralgias, back pain, joint swelling and neck pain.   Skin: Negative for color change, rash and skin lesions.   Neurological: Negative for dizziness, weakness, headache, memory problem and confusion.   Hematological: Does not bruise/bleed easily.   Psychiatric/Behavioral: Negative for agitation, hallucinations and suicidal ideas. The patient is not nervous/anxious.        Objective   Past Medical History:   Diagnosis Date   • Anxiety    • Arthritis    • Asthma    • Depression    • GERD (gastroesophageal reflux disease)    • Glaucoma    • H. 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    Procedure: CERVICAL SPINAL TUMOR REMOVAL RIGHT;  Surgeon: Yoav Palencia MD;  Location: [facility] OR;  Service:    • CHOLECYSTECTOMY     • COLONOSCOPY  08/27/2015   • COLONOSCOPY N/A 9/30/2019    Procedure: COLONOSCOPY WITH ANESTHESIA;  Surgeon: Bryan Warner MD;  Location: [facility] ENDOSCOPY;  Service: Gastroenterology   • ENDOSCOPY  08/24/2010   • ENDOSCOPY N/A 9/30/2019    Procedure: ESOPHAGOGASTRODUODENOSCOPY WITH ANESTHESIA;  Surgeon: Bryan Warner MD;  Location: [facility] ENDOSCOPY;  Service: Gastroenterology   • GALLBLADDER SURGERY     • HYSTERECTOMY     • TONSILLECTOMY          Current Outpatient Medications:   •  acetaminophen (TYLENOL) 500 MG tablet, Take 500  mg by mouth 2 (Two) Times a Day., Disp: , Rfl:   •  albuterol sulfate HFA (PROAIR HFA) 108 (90 Base) MCG/ACT inhaler, Inhale 2 puffs Every 4 (Four) Hours As Needed for Wheezing., Disp: 1 inhaler, Rfl: 11  •  calcium carbonate (OS-JUAN ANTONIO) 600 MG tablet, Take 600 mg by mouth Daily., Disp: , Rfl:   •  celecoxib (CeleBREX) 200 MG capsule, Take 1 capsule by mouth Daily. Take with food if GI upset occurs., Disp: 90 capsule, Rfl: 3  •  CloNIDine (CATAPRES) 0.1 MG tablet, Take 0.1 mg by mouth Every Night., Disp: , Rfl:   •  DULoxetine (CYMBALTA) 30 MG capsule, TAKE 1 CAPSULE BY MOUTH DAILY. BLUE [employer] BRAND ONLY, Disp: 30 capsule, Rfl: 11  •  fluticasone-salmeterol (Advair Diskus) 100-50 MCG/DOSE DISKUS, Inhale 1 puff 2 (Two) Times a Day. Rinse and spit after using., Disp: 3 each, Rfl: 3  •  levothyroxine (SYNTHROID, LEVOTHROID) 100 MCG tablet, Take 1 tablet by mouth Daily. Take on empty stomach., Disp: 90 tablet, Rfl: 3  •  LUMIGAN 0.01 % ophthalmic drops, , Disp: , Rfl:   •  spironolactone (ALDACTONE) 25 MG tablet, TAKE 1 TABLET DAILY, Disp: 90 tablet, Rfl: 3  •  theophylline (THEODUR) 300 MG 12 hr tablet, TAKE 1 TABLET TWICE A DAY, Disp: 180 tablet, Rfl: 3  •  traZODone (DESYREL) 50 MG tablet, Take 50 mg by mouth Every Night., Disp: , Rfl:   •  vitamin D (ERGOCALCIFEROL) 1.25 MG (63786 UT) capsule capsule, TAKE 1 CAPSULE EVERY WEEK, Disp: 12 capsule, Rfl: 4     Vitals:    08/28/20 1113   BP: 130/70   Pulse: 82   Temp: 98.9 °F (37.2 °C)   SpO2: 97%         08/28/20  1113   Weight: 103 kg (226 lb 9.6 oz)           Physical Exam   Constitutional: She is oriented to person, place, and time. She appears well-developed and well-nourished. She does not appear ill.   HENT:   Head: Normocephalic and atraumatic.   Right Ear: Tympanic membrane and external ear normal.   Left Ear: Tympanic membrane and external ear normal.   Nose: Nose normal.   Mouth/Throat: Oropharynx is clear and moist.   Eyes: Pupils are equal, round, and reactive  to light. Conjunctivae and EOM are normal.   Neck: Normal range of motion. Neck supple. Spinous process tenderness (Chronic tenderness) present. Carotid bruit is not present.   Cardiovascular: Normal rate, regular rhythm, normal heart sounds and intact distal pulses.   Pulses:       Radial pulses are 2+ on the right side, and 2+ on the left side.   Pulmonary/Chest: Effort normal and breath sounds normal. She exhibits tenderness (mild with palpation to left side).   Musculoskeletal:        Left shoulder: She exhibits decreased range of motion (Chronic shoulder injury).   Lymphadenopathy:     She has no cervical adenopathy.   Neurological: She is alert and oriented to person, place, and time.   Skin: Skin is warm and dry.   Psychiatric: She has a normal mood and affect. Her behavior is normal. Thought content normal.   Nursing note and vitals reviewed.      ECG 12 Lead  Date/Time: 8/31/2020 9:29 AM  Performed by: Zoie Sullivan APRN  Authorized by: Zoie Sullivan APRN   Comparison: not compared with previous ECG   Rhythm: sinus rhythm  Conduction: left anterior fascicular block          Assessment/Plan   Diagnoses and all orders for this visit:    1. Hypothyroidism (acquired) (Primary)  -     TSH  -     T3, free  -     T4, free    2. Fatigue, unspecified type  -     CBC & Differential  -     Vitamin B12  -     Folate    3. Vitamin D deficiency  -     Vitamin D 25 hydroxy    4. Chest pain, unspecified type  -     ECG 12 Lead      Thania's symptoms are suggestive of hypothyroidism.  I will check these levels today.  I am interested in vitamin levels and as well and ruling out anemia.    EKG was completed in the office due to episodes of chest discomfort. With her shoulder history, I do feel the discomfort she felt could have been attributed by her chronic shoulder issue and how she was laying in the bed.  I have instructed her to go to ER for chest pain, shortness of breath, or any other concerning symptoms.   In  regards to her difficulty moving through tasks, there is a family history of dementia.  She denies any head trauma or history of strokes.  I would like to see what labs show first and if they are insignificant, will have patient follow up and will complete MMSE and consider imaging.

## 2020-08-29 LAB
25(OH)D3+25(OH)D2 SERPL-MCNC: 44.1 NG/ML (ref 30–100)
BASOPHILS # BLD AUTO: 0.02 10*3/MM3 (ref 0–0.2)
BASOPHILS NFR BLD AUTO: 0.4 % (ref 0–1.5)
EOSINOPHIL # BLD AUTO: 0.08 10*3/MM3 (ref 0–0.4)
EOSINOPHIL NFR BLD AUTO: 1.8 % (ref 0.3–6.2)
ERYTHROCYTE [DISTWIDTH] IN BLOOD BY AUTOMATED COUNT: 12.4 % (ref 12.3–15.4)
FOLATE SERPL-MCNC: 7.05 NG/ML (ref 4.78–24.2)
HCT VFR BLD AUTO: 41.1 % (ref 34–46.6)
HGB BLD-MCNC: 13.6 G/DL (ref 12–15.9)
IMM GRANULOCYTES # BLD AUTO: 0.01 10*3/MM3 (ref 0–0.05)
IMM GRANULOCYTES NFR BLD AUTO: 0.2 % (ref 0–0.5)
LYMPHOCYTES # BLD AUTO: 1.2 10*3/MM3 (ref 0.7–3.1)
LYMPHOCYTES NFR BLD AUTO: 26.4 % (ref 19.6–45.3)
MCH RBC QN AUTO: 30.8 PG (ref 26.6–33)
MCHC RBC AUTO-ENTMCNC: 33.1 G/DL (ref 31.5–35.7)
MCV RBC AUTO: 93 FL (ref 79–97)
MONOCYTES # BLD AUTO: 0.45 10*3/MM3 (ref 0.1–0.9)
MONOCYTES NFR BLD AUTO: 9.9 % (ref 5–12)
NEUTROPHILS # BLD AUTO: 2.79 10*3/MM3 (ref 1.7–7)
NEUTROPHILS NFR BLD AUTO: 61.3 % (ref 42.7–76)
NRBC BLD AUTO-RTO: 0 /100 WBC (ref 0–0.2)
PLATELET # BLD AUTO: 179 10*3/MM3 (ref 140–450)
RBC # BLD AUTO: 4.42 10*6/MM3 (ref 3.77–5.28)
T3FREE SERPL-MCNC: 2 PG/ML (ref 2–4.4)
T4 FREE SERPL-MCNC: 1.61 NG/DL (ref 0.93–1.7)
TSH SERPL DL<=0.005 MIU/L-ACNC: 1.38 UIU/ML (ref 0.27–4.2)
VIT B12 SERPL-MCNC: 265 PG/ML (ref 211–946)
WBC # BLD AUTO: 4.55 10*3/MM3 (ref 3.4–10.8)

## 2020-09-08 RX ORDER — TRAZODONE HYDROCHLORIDE 50 MG/1
TABLET ORAL
Qty: 90 TABLET | Refills: 3 | Status: SHIPPED | OUTPATIENT
Start: 2020-09-08 | End: 2021-09-01

## 2020-09-11 ENCOUNTER — OFFICE VISIT (OUTPATIENT)
Dept: INTERNAL MEDICINE | Facility: CLINIC | Age: 75
End: 2020-09-11

## 2020-09-11 VITALS
HEART RATE: 87 BPM | OXYGEN SATURATION: 98 % | HEIGHT: 66 IN | SYSTOLIC BLOOD PRESSURE: 140 MMHG | WEIGHT: 227 LBS | DIASTOLIC BLOOD PRESSURE: 78 MMHG | BODY MASS INDEX: 36.48 KG/M2 | TEMPERATURE: 98.4 F

## 2020-09-11 DIAGNOSIS — F43.23 SITUATIONAL MIXED ANXIETY AND DEPRESSIVE DISORDER: Primary | ICD-10-CM

## 2020-09-11 DIAGNOSIS — R53.83 FATIGUE, UNSPECIFIED TYPE: ICD-10-CM

## 2020-09-11 PROBLEM — F32.A ANXIETY AND DEPRESSION: Status: ACTIVE | Noted: 2020-09-11

## 2020-09-11 PROBLEM — R73.01 IMPAIRED FASTING GLUCOSE: Status: ACTIVE | Noted: 2020-09-11

## 2020-09-11 PROBLEM — F41.9 ANXIETY AND DEPRESSION: Status: ACTIVE | Noted: 2020-09-11

## 2020-09-11 PROBLEM — J45.998 ALLERGIC-INFECTIVE ASTHMA: Status: ACTIVE | Noted: 2020-09-11

## 2020-09-11 PROBLEM — E03.9 MYXEDEMA HEART DISEASE: Status: ACTIVE | Noted: 2020-09-11

## 2020-09-11 PROBLEM — I10 BENIGN HYPERTENSION: Status: ACTIVE | Noted: 2020-09-11

## 2020-09-11 PROBLEM — K52.9 GASTROENTERITIS: Status: ACTIVE | Noted: 2020-09-11

## 2020-09-11 PROBLEM — E87.6 HYPOKALEMIA: Status: ACTIVE | Noted: 2020-09-11

## 2020-09-11 PROBLEM — I44.4 LAFB (LEFT ANTERIOR FASCICULAR BLOCK): Status: ACTIVE | Noted: 2020-09-11

## 2020-09-11 PROBLEM — I51.9 MYXEDEMA HEART DISEASE: Status: ACTIVE | Noted: 2020-09-11

## 2020-09-11 PROBLEM — M85.80 OSTEOPENIA: Status: ACTIVE | Noted: 2020-09-11

## 2020-09-11 PROBLEM — M35.3 POLYMYALGIA RHEUMATICA (HCC): Status: ACTIVE | Noted: 2020-09-11

## 2020-09-11 PROBLEM — M54.12 CERVICAL RADICULITIS: Status: ACTIVE | Noted: 2020-09-11

## 2020-09-11 PROBLEM — F51.02 INSOMNIA DUE TO STRESS: Status: ACTIVE | Noted: 2020-09-11

## 2020-09-11 PROBLEM — H81.13 BPPV (BENIGN PAROXYSMAL POSITIONAL VERTIGO), BILATERAL: Status: ACTIVE | Noted: 2020-09-11

## 2020-09-11 PROCEDURE — 99214 OFFICE O/P EST MOD 30 MIN: CPT | Performed by: NURSE PRACTITIONER

## 2020-09-11 RX ORDER — BUSPIRONE HYDROCHLORIDE 5 MG/1
5 TABLET ORAL 3 TIMES DAILY
Qty: 90 TABLET | Refills: 2 | Status: SHIPPED | OUTPATIENT
Start: 2020-09-11 | End: 2021-04-26

## 2020-09-11 NOTE — PROGRESS NOTES
Subjective   Thania Casiano is a 75 y.o. female.   Chief Complaint   Patient presents with   • Follow-up     thyroid issues, dementia last note states you want to do a MMSE   • discuss medication     pt doesnt feel that cymbalta is working well        Thania presents today for follow up on fatigue.  At the previous visit she discussed having trouble getting out of the bed and feeling that her thyroid levels may be off.  Labs were completed and unremarkable.  She states she feels her depression may be the concern.  This is something she has struggled with in the past and states she has been psych before.  She used to be on Wellbutrin that worked really well for her but when it changed from a brand to a generic, it seemed to stop working.  She tried several other manufacturers without success.  She states she was trialed and several other medications for mood but finally started Cymbalta which seemed to work the best for her.  She continues on this 30mg daily.  She has gone up to 60mg in the past but states it made her too drowsy.  She denies SI or HI.  She does have anxiety and states she has trouble focusing on one task.  She is overwhelmed with responsibility with caring for her mother.  She states even cooking a meal now is hard for her to get through.      She states she doesn't sleep well due to pain in her back from prior falls and chronic issues.  She does not like to take pain medication but does take Celebrex and Tylenol.  She states when she is awake at night, her mind is thinking a lot.  When she has gone a few days without good sleep she will take a tylenol PM and rests well through the night.     The following portions of the patient's history were reviewed and updated as appropriate: allergies, current medications, past family history, past medical history, past social history, past surgical history and problem list.    Review of Systems   Constitutional: Negative for activity change, appetite  change, fatigue, fever, unexpected weight gain and unexpected weight loss.   HENT: Negative for swollen glands, trouble swallowing and voice change.    Eyes: Negative for blurred vision and visual disturbance.   Respiratory: Negative for cough and shortness of breath.    Cardiovascular: Negative for chest pain, palpitations and leg swelling.   Gastrointestinal: Negative for abdominal pain, constipation, diarrhea, nausea, vomiting and indigestion.   Endocrine: Negative for cold intolerance, heat intolerance, polydipsia and polyphagia.   Genitourinary: Negative for dysuria and frequency.   Musculoskeletal: Negative for arthralgias, back pain, joint swelling and neck pain.   Skin: Negative for color change, rash and skin lesions.   Neurological: Negative for dizziness, weakness, headache, memory problem and confusion.        Difficulty with completing tasks    Hematological: Does not bruise/bleed easily.   Psychiatric/Behavioral: Positive for depressed mood. Negative for agitation, hallucinations and suicidal ideas. The patient is nervous/anxious.        Objective   Past Medical History:   Diagnosis Date   • Anxiety    • Arthritis    • Asthma    • Depression    • GERD (gastroesophageal reflux disease)    • Glaucoma    • H. 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    Procedure: CERVICAL SPINAL TUMOR REMOVAL RIGHT;  Surgeon: Yoav Palencia MD;  Location: [facility];  Service:    • CHOLECYSTECTOMY     • COLONOSCOPY  08/27/2015   • COLONOSCOPY N/A 9/30/2019    Procedure: COLONOSCOPY WITH ANESTHESIA;   Surgeon: Bryan Warner MD;  Location: [facility] ENDOSCOPY;  Service: Gastroenterology   • ENDOSCOPY  08/24/2010   • ENDOSCOPY N/A 9/30/2019    Procedure: ESOPHAGOGASTRODUODENOSCOPY WITH ANESTHESIA;  Surgeon: Bryan Warner MD;  Location: [facility] ENDOSCOPY;  Service: Gastroenterology   • GALLBLADDER SURGERY     • HYSTERECTOMY     • TONSILLECTOMY          Current Outpatient Medications:   •  acetaminophen (TYLENOL) 500 MG tablet, Take 500 mg by mouth 2 (Two) Times a Day., Disp: , Rfl:   •  albuterol sulfate HFA (PROAIR HFA) 108 (90 Base) MCG/ACT inhaler, Inhale 2 puffs Every 4 (Four) Hours As Needed for Wheezing., Disp: 1 inhaler, Rfl: 11  •  calcium carbonate (OS-JUAN ANTONIO) 600 MG tablet, Take 600 mg by mouth Daily., Disp: , Rfl:   •  celecoxib (CeleBREX) 200 MG capsule, Take 1 capsule by mouth Daily. Take with food if GI upset occurs., Disp: 90 capsule, Rfl: 3  •  CloNIDine (CATAPRES) 0.1 MG tablet, Take 0.1 mg by mouth Every Night., Disp: , Rfl:   •  DULoxetine (CYMBALTA) 30 MG capsule, TAKE 1 CAPSULE BY MOUTH DAILY. BLUE [employer] BRAND ONLY, Disp: 30 capsule, Rfl: 11  •  fluticasone-salmeterol (Advair Diskus) 100-50 MCG/DOSE DISKUS, Inhale 1 puff 2 (Two) Times a Day. Rinse and spit after using., Disp: 3 each, Rfl: 3  •  levothyroxine (SYNTHROID, LEVOTHROID) 100 MCG tablet, Take 1 tablet by mouth Daily. Take on empty stomach., Disp: 90 tablet, Rfl: 3  •  LUMIGAN 0.01 % ophthalmic drops, , Disp: , Rfl:   •  spironolactone (ALDACTONE) 25 MG tablet, TAKE 1 TABLET DAILY, Disp: 90 tablet, Rfl: 3  •  theophylline (THEODUR) 300 MG 12 hr tablet, TAKE 1 TABLET TWICE A DAY, Disp: 180 tablet, Rfl: 3  •  traZODone (DESYREL) 50 MG tablet, TAKE 1 TABLET AT BEDTIME, Disp: 90 tablet, Rfl: 3  •  vitamin D (ERGOCALCIFEROL) 1.25 MG (23843 UT) capsule capsule, TAKE 1 CAPSULE EVERY WEEK, Disp: 12 capsule, Rfl: 4  •  busPIRone (BUSPAR) 5 MG tablet, Take 1 tablet by mouth 3 (Three) Times a Day., Disp: 90 tablet, Rfl: 2     Vitals:     09/11/20 1114   BP: 140/78   Pulse: 87   Temp: 98.4 °F (36.9 °C)   SpO2: 98%         09/11/20  1114   Weight: 103 kg (227 lb)           Physical Exam   Constitutional: She is oriented to person, place, and time. She appears well-developed and well-nourished.   HENT:   Head: Normocephalic and atraumatic.   Right Ear: External ear normal.   Left Ear: External ear normal.   Eyes: EOM are normal.   Neck: Normal range of motion.   Cardiovascular: Normal rate, regular rhythm, normal heart sounds and intact distal pulses.   Pulmonary/Chest: Effort normal and breath sounds normal.   Musculoskeletal:        Arms:  Neurological: She is alert and oriented to person, place, and time.   Skin: Skin is warm and dry.   Psychiatric: She has a normal mood and affect. Her behavior is normal. Thought content normal.   Nursing note and vitals reviewed.      MMSE 30/30; appropriate clock drawing      Assessment/Plan   Diagnoses and all orders for this visit:    1. Anxiety and depression (Primary)  -     busPIRone (BUSPAR) 5 MG tablet; Take 1 tablet by mouth 3 (Three) Times a Day.  Dispense: 90 tablet; Refill: 2    2. Fatigue, unspecified type        Thania is sensitive to changes in her medication and reports medications make her tired.  I would like to leave Cymbalta where it is, but start Buspar to help with her overwhelming feelings and anxiety.  We have discussed side effects and to monitor mood.  I am hopeful that calming her nerves some will allow her to focus on tasks more and feel less overwhelmed.  I have asked for her to follow up in the next 1-2 months to re-evaluate.  If she feels she is doing well and without concerns she can keep her next scheduled routine follow up.

## 2020-10-19 RX ORDER — CLONIDINE HYDROCHLORIDE 0.1 MG/1
TABLET ORAL
Qty: 180 TABLET | Refills: 3 | Status: SHIPPED | OUTPATIENT
Start: 2020-10-19 | End: 2021-11-29

## 2020-11-05 ENCOUNTER — LAB (OUTPATIENT)
Dept: INTERNAL MEDICINE | Facility: CLINIC | Age: 75
End: 2020-11-05

## 2020-11-05 DIAGNOSIS — R73.01 IFG (IMPAIRED FASTING GLUCOSE): ICD-10-CM

## 2020-11-05 DIAGNOSIS — I10 HYPERTENSION, UNSPECIFIED TYPE: ICD-10-CM

## 2020-11-05 DIAGNOSIS — E55.9 VITAMIN D DEFICIENCY: ICD-10-CM

## 2020-11-05 DIAGNOSIS — E03.9 HYPOTHYROIDISM (ACQUIRED): Primary | ICD-10-CM

## 2020-11-06 LAB
25(OH)D3+25(OH)D2 SERPL-MCNC: 40.2 NG/ML (ref 30–100)
ALBUMIN SERPL-MCNC: 4.2 G/DL (ref 3.5–5.2)
ALBUMIN/GLOB SERPL: 2.3 G/DL
ALP SERPL-CCNC: 89 U/L (ref 39–117)
ALT SERPL-CCNC: 15 U/L (ref 1–33)
APPEARANCE UR: CLEAR
AST SERPL-CCNC: 21 U/L (ref 1–32)
BACTERIA #/AREA URNS HPF: NORMAL /HPF
BASOPHILS # BLD AUTO: 0.03 10*3/MM3 (ref 0–0.2)
BASOPHILS NFR BLD AUTO: 0.9 % (ref 0–1.5)
BILIRUB SERPL-MCNC: 0.3 MG/DL (ref 0–1.2)
BILIRUB UR QL STRIP: NEGATIVE
BUN SERPL-MCNC: 14 MG/DL (ref 8–23)
BUN/CREAT SERPL: 17.3 (ref 7–25)
CALCIUM SERPL-MCNC: 9 MG/DL (ref 8.6–10.5)
CASTS URNS MICRO: NORMAL
CHLORIDE SERPL-SCNC: 103 MMOL/L (ref 98–107)
CHOLEST SERPL-MCNC: 201 MG/DL (ref 0–200)
CO2 SERPL-SCNC: 28.1 MMOL/L (ref 22–29)
COLOR UR: YELLOW
CREAT SERPL-MCNC: 0.81 MG/DL (ref 0.57–1)
EOSINOPHIL # BLD AUTO: 0.05 10*3/MM3 (ref 0–0.4)
EOSINOPHIL NFR BLD AUTO: 1.6 % (ref 0.3–6.2)
EPI CELLS #/AREA URNS HPF: NORMAL /HPF
ERYTHROCYTE [DISTWIDTH] IN BLOOD BY AUTOMATED COUNT: 13.2 % (ref 12.3–15.4)
GLOBULIN SER CALC-MCNC: 1.8 GM/DL
GLUCOSE SERPL-MCNC: 97 MG/DL (ref 65–99)
GLUCOSE UR QL: NEGATIVE
HBA1C MFR BLD: 5.35 % (ref 4.8–5.6)
HCT VFR BLD AUTO: 40.2 % (ref 34–46.6)
HCV AB S/CO SERPL IA: <0.1 S/CO RATIO (ref 0–0.9)
HDLC SERPL-MCNC: 63 MG/DL (ref 40–60)
HGB BLD-MCNC: 13.6 G/DL (ref 12–15.9)
HGB UR QL STRIP: NEGATIVE
IMM GRANULOCYTES # BLD AUTO: 0.01 10*3/MM3 (ref 0–0.05)
IMM GRANULOCYTES NFR BLD AUTO: 0.3 % (ref 0–0.5)
KETONES UR QL STRIP: NEGATIVE
LDLC SERPL CALC-MCNC: 115 MG/DL (ref 0–100)
LEUKOCYTE ESTERASE UR QL STRIP: NEGATIVE
LYMPHOCYTES # BLD AUTO: 0.81 10*3/MM3 (ref 0.7–3.1)
LYMPHOCYTES NFR BLD AUTO: 25.6 % (ref 19.6–45.3)
MCH RBC QN AUTO: 31.1 PG (ref 26.6–33)
MCHC RBC AUTO-ENTMCNC: 33.8 G/DL (ref 31.5–35.7)
MCV RBC AUTO: 91.8 FL (ref 79–97)
MONOCYTES # BLD AUTO: 0.35 10*3/MM3 (ref 0.1–0.9)
MONOCYTES NFR BLD AUTO: 11.1 % (ref 5–12)
NEUTROPHILS # BLD AUTO: 1.91 10*3/MM3 (ref 1.7–7)
NEUTROPHILS NFR BLD AUTO: 60.5 % (ref 42.7–76)
NITRITE UR QL STRIP: NEGATIVE
NRBC BLD AUTO-RTO: 0 /100 WBC (ref 0–0.2)
PH UR STRIP: 5.5 [PH] (ref 5–8)
PLATELET # BLD AUTO: 168 10*3/MM3 (ref 140–450)
POTASSIUM SERPL-SCNC: 3.8 MMOL/L (ref 3.5–5.2)
PROT SERPL-MCNC: 6 G/DL (ref 6–8.5)
PROT UR QL STRIP: NEGATIVE
RBC # BLD AUTO: 4.38 10*6/MM3 (ref 3.77–5.28)
RBC #/AREA URNS HPF: NORMAL /HPF
SODIUM SERPL-SCNC: 139 MMOL/L (ref 136–145)
SP GR UR: 1.02 (ref 1–1.03)
TRIGL SERPL-MCNC: 132 MG/DL (ref 0–150)
TSH SERPL DL<=0.005 MIU/L-ACNC: 1.43 UIU/ML (ref 0.27–4.2)
URATE SERPL-MCNC: 5.3 MG/DL (ref 2.4–5.7)
UROBILINOGEN UR STRIP-MCNC: NORMAL MG/DL
VLDLC SERPL CALC-MCNC: 23 MG/DL (ref 5–40)
WBC # BLD AUTO: 3.16 10*3/MM3 (ref 3.4–10.8)
WBC #/AREA URNS HPF: NORMAL /HPF

## 2020-11-11 ENCOUNTER — OFFICE VISIT (OUTPATIENT)
Dept: INTERNAL MEDICINE | Facility: CLINIC | Age: 75
End: 2020-11-11

## 2020-11-11 VITALS
HEIGHT: 66 IN | DIASTOLIC BLOOD PRESSURE: 80 MMHG | WEIGHT: 228 LBS | SYSTOLIC BLOOD PRESSURE: 142 MMHG | OXYGEN SATURATION: 99 % | HEART RATE: 85 BPM | TEMPERATURE: 97.5 F | BODY MASS INDEX: 36.64 KG/M2

## 2020-11-11 DIAGNOSIS — J45.20 MILD INTERMITTENT ASTHMA WITHOUT COMPLICATION: ICD-10-CM

## 2020-11-11 DIAGNOSIS — I10 BENIGN HYPERTENSION: ICD-10-CM

## 2020-11-11 DIAGNOSIS — Z78.0 POST-MENOPAUSAL: Primary | ICD-10-CM

## 2020-11-11 DIAGNOSIS — Z23 NEED FOR INFLUENZA VACCINATION: ICD-10-CM

## 2020-11-11 DIAGNOSIS — K21.9 GASTROESOPHAGEAL REFLUX DISEASE WITHOUT ESOPHAGITIS: ICD-10-CM

## 2020-11-11 DIAGNOSIS — E66.3 OVERWEIGHT: ICD-10-CM

## 2020-11-11 PROCEDURE — G0438 PPPS, INITIAL VISIT: HCPCS | Performed by: INTERNAL MEDICINE

## 2020-11-11 PROCEDURE — 90694 VACC AIIV4 NO PRSRV 0.5ML IM: CPT | Performed by: INTERNAL MEDICINE

## 2020-11-11 PROCEDURE — G0008 ADMIN INFLUENZA VIRUS VAC: HCPCS | Performed by: INTERNAL MEDICINE

## 2020-11-11 RX ORDER — BUPROPION HYDROCHLORIDE 150 MG/1
150 TABLET ORAL DAILY
Qty: 30 TABLET | Refills: 11 | Status: SHIPPED | OUTPATIENT
Start: 2020-11-11 | End: 2020-12-03 | Stop reason: SDUPTHER

## 2020-11-11 NOTE — PROGRESS NOTES
The ABCs of the Annual Wellness Visit  Initial Medicare Wellness Visit    Chief Complaint   Patient presents with   • Medicare Wellness-Initial Visit       Subjective   History of Present Illness:  Thania Casiano is a 75 y.o. female who presents for an Initial Medicare Wellness Visit.    HEALTH RISK ASSESSMENT    Recent Hospitalizations:  No hospitalization(s) within the last year.    Current Medical Providers:  Patient Care Team:  Davon Ashley MD as PCP - General  Bryan Warner MD as Consulting Physician (Gastroenterology)  Natan Hughes MD as Consulting Physician (Pulmonary Disease)  Denise Quan APRN as Nurse Practitioner (Pulmonary Disease)    Smoking Status:  Social History     Tobacco Use   Smoking Status Former Smoker   • Quit date:    • Years since quittin.8   Smokeless Tobacco Never Used       Alcohol Consumption:  Social History     Substance and Sexual Activity   Alcohol Use Yes   • Frequency: 2-3 times a week    Comment: rare       Depression Screen:   PHQ-2/PHQ-9 Depression Screening 2020   Little interest or pleasure in doing things 0   Feeling down, depressed, or hopeless 0   Total Score 0       Fall Risk Screen:  BLAINE Fall Risk Assessment was completed, and patient is at MODERATE risk for falls. Assessment completed on:2020    Health Habits and Functional and Cognitive Screening:  No flowsheet data found.      Does the patient have evidence of cognitive impairment? No    Asprin use counseling:Does not need ASA (and currently is not on it)    Age-appropriate Screening Schedule:  Refer to the list below for future screening recommendations based on patient's age, sex and/or medical conditions. Orders for these recommended tests are listed in the plan section. The patient has been provided with a written plan.    Health Maintenance   Topic Date Due   • TDAP/TD VACCINES (1 - Tdap) 1965   • ZOSTER VACCINE (1 of 2) 1996   • INFLUENZA  VACCINE  08/01/2020   • DXA SCAN  09/11/2020   • MAMMOGRAM  05/04/2022   • COLONOSCOPY  09/30/2024          The following portions of the patient's history were reviewed and updated as appropriate: allergies, current medications, past family history, past medical history, past social history, past surgical history and problem list.    Outpatient Medications Prior to Visit   Medication Sig Dispense Refill   • acetaminophen (TYLENOL) 500 MG tablet Take 500 mg by mouth 2 (Two) Times a Day.     • albuterol sulfate HFA (PROAIR HFA) 108 (90 Base) MCG/ACT inhaler Inhale 2 puffs Every 4 (Four) Hours As Needed for Wheezing. 1 inhaler 11   • busPIRone (BUSPAR) 5 MG tablet Take 1 tablet by mouth 3 (Three) Times a Day. (Patient taking differently: Take 5 mg by mouth 3 (Three) Times a Day. Pt only takes once in the evening) 90 tablet 2   • calcium carbonate (OS-JUAN ANTONIO) 600 MG tablet Take 600 mg by mouth Daily.     • celecoxib (CeleBREX) 200 MG capsule Take 1 capsule by mouth Daily. Take with food if GI upset occurs. 90 capsule 3   • cloNIDine (CATAPRES) 0.1 MG tablet TAKE 1 TABLET BY MOUTH AT BEDTIME. MAY TAKE ADDITIONAL 1 TABLET EVERY6 HOURS AS NEEDED FOR BLOOD PRESSURE GREATER THAN 160/90 180 tablet 3   • fluticasone-salmeterol (Advair Diskus) 100-50 MCG/DOSE DISKUS Inhale 1 puff 2 (Two) Times a Day. Rinse and spit after using. 3 each 3   • levothyroxine (SYNTHROID, LEVOTHROID) 100 MCG tablet Take 1 tablet by mouth Daily. Take on empty stomach. 90 tablet 3   • LUMIGAN 0.01 % ophthalmic drops      • spironolactone (ALDACTONE) 25 MG tablet TAKE 1 TABLET DAILY 90 tablet 3   • theophylline (THEODUR) 300 MG 12 hr tablet TAKE 1 TABLET TWICE A  tablet 3   • traZODone (DESYREL) 50 MG tablet TAKE 1 TABLET AT BEDTIME 90 tablet 3   • vitamin D (ERGOCALCIFEROL) 1.25 MG (37938 UT) capsule capsule TAKE 1 CAPSULE EVERY WEEK 12 capsule 4   • DULoxetine (CYMBALTA) 30 MG capsule TAKE 1 CAPSULE BY MOUTH DAILY. [employer] BR[employer] ONLY 30  capsule 11     No facility-administered medications prior to visit.        Patient Active Problem List   Diagnosis   • Carpal tunnel syndrome of right wrist   • Morbid obesity (CMS/HCC)   • Mass of right side of neck   • Former smoker   • BMI 35.0-35.9,adult   • Neoplasm of uncertain behavior of neck   • Non-smoker   • Benign neoplasm of neck   • BMI 38.0-38.9,adult   • Mild intermittent asthma   • Mild persistent asthma without complication   • GERD (gastroesophageal reflux disease)   • Family history of polyps in the colon   • Left upper quadrant pain   • Nausea   • Constipation   • Family hx of colon cancer   • Family hx colonic polyps   • Asthma   • Breast mass   • Overweight   • Vitamin D deficiency   • Anxiety and depression   • Polymyalgia rheumatica (CMS/HCC)   • Osteopenia   • Myxedema heart disease   • LAFB (left anterior fascicular block)   • Insomnia due to stress   • Impaired fasting glucose   • Hypokalemia   • Gastroenteritis   • Cervical radiculitis   • BPPV (benign paroxysmal positional vertigo), bilateral   • Benign hypertension   • Allergic-infective asthma       Advanced Care Planning:  ACP discussion was held with the patient during this visit. Patient does not have an advance directive, information provided.    Review of Systems   Constitutional: Negative for activity change, appetite change and chills.   HENT: Negative for congestion, ear pain and facial swelling.    Eyes: Negative for pain, discharge and itching.   Respiratory: Negative for apnea, cough and shortness of breath.    Cardiovascular: Negative for chest pain, palpitations and leg swelling.   Gastrointestinal: Negative for abdominal distention, abdominal pain and anal bleeding.   Endocrine: Negative for cold intolerance and heat intolerance.   Genitourinary: Negative for difficulty urinating, dysuria and flank pain.   Musculoskeletal: Negative for arthralgias, back pain and joint swelling.   Skin: Negative for color change, pallor  "and rash.   Allergic/Immunologic: Negative for environmental allergies and food allergies.   Neurological: Negative for dizziness and facial asymmetry.   Hematological: Negative for adenopathy. Does not bruise/bleed easily.   Psychiatric/Behavioral: Negative for agitation, behavioral problems and confusion.       Compared to one year ago, the patient feels her physical health is the same.  Compared to one year ago, the patient feels her mental health is the same.    Reviewed chart for potential of high risk medication in the elderly: yes  Reviewed chart for potential of harmful drug interactions in the elderly:yes    Objective         Vitals:    11/11/20 1108   BP: 142/80   BP Location: Left arm   Patient Position: Sitting   Cuff Size: Adult   Pulse: 85   Temp: 97.5 °F (36.4 °C)   TempSrc: Temporal   SpO2: 99%   Weight: 103 kg (228 lb)   Height: 166.4 cm (65.5\")   PainSc: 0-No pain       Body mass index is 37.36 kg/m².  Discussed the patient's BMI with her. The BMI is above average; BMI management plan is completed.    Physical Exam  Constitutional:       Appearance: Normal appearance. She is well-developed.   HENT:      Head: Normocephalic and atraumatic.      Right Ear: External ear normal.      Left Ear: External ear normal.   Eyes:      Extraocular Movements: Extraocular movements intact.      Conjunctiva/sclera: Conjunctivae normal.      Pupils: Pupils are equal, round, and reactive to light.   Neck:      Musculoskeletal: Normal range of motion and neck supple. No neck rigidity.      Vascular: No carotid bruit.   Cardiovascular:      Rate and Rhythm: Normal rate and regular rhythm.      Pulses: Normal pulses.      Heart sounds: Normal heart sounds. No murmur. No gallop.    Pulmonary:      Effort: Pulmonary effort is normal.      Breath sounds: Normal breath sounds.   Musculoskeletal: Normal range of motion.         General: No swelling or tenderness.   Skin:     General: Skin is warm and dry.   Neurological:    "   General: No focal deficit present.      Mental Status: She is alert and oriented to person, place, and time.   Psychiatric:         Mood and Affect: Mood normal.         Behavior: Behavior normal.         Lab Results   Component Value Date    GLU 97 11/05/2020    CHLPL 201 (H) 11/05/2020    TRIG 132 11/05/2020    HDL 63 (H) 11/05/2020     (H) 11/05/2020    VLDL 23 11/05/2020    HGBA1C 5.35 11/05/2020        Assessment/Plan   Medicare Risks and Personalized Health Plan  CMS Preventative Services Quick Reference  Advance Directive Discussion  Breast Cancer/Mammogram Screening  Immunizations Discussed/Encouraged (specific immunizations; adacel Tdap, Influenza and Shingrix )  Obesity/Overweight     The above risks/problems have been discussed with the patient.  Pertinent information has been shared with the patient in the After Visit Summary.  Follow up plans and orders are seen below in the Assessment/Plan Section.    Diagnoses and all orders for this visit:    1. Post-menopausal (Primary)  -     DEXA Bone Density Axial; Future    2. Need for influenza vaccination  -     Fluad Quad >65 years    3. Benign hypertension    4. Mild intermittent asthma without complication    5. Gastroesophageal reflux disease without esophagitis    6. Overweight    Other orders  -     buPROPion XL (Wellbutrin XL) 150 MG 24 hr tablet; Take 1 tablet by mouth Daily.  Dispense: 30 tablet; Refill: 11      Follow Up:  No follow-ups on file.     An After Visit Summary and PPPS were given to the patient.     We spent time talking about her weight we also talked about her current depression and her inability to respond to Cymbalta she wants to go ahead and try the Wellbutrin 150 mg again so I am starting her back on that we may need to titrate her dosage up to 300 depending on her response.  Her blood pressures well controlled her reflux seems to be adequately controlled she knows that she is overweight and and she is continuing to work on  that.

## 2020-12-02 NOTE — TELEPHONE ENCOUNTER
While speaking to pt about her dexa scan result patient also voiced issues with the Wellbutrin dosage.   She states she is still having the anxiety and crying spells and is wondering about upping the dosage.  Please advise

## 2020-12-02 NOTE — TELEPHONE ENCOUNTER
Inform patient she can take 2 of the Wellbutrin 150s to make a total dose of 300 or she can take the 150s twice daily if that works well for her we will call in a new prescription for Wellbutrin

## 2020-12-03 RX ORDER — BUPROPION HYDROCHLORIDE 300 MG/1
300 TABLET ORAL DAILY
Qty: 90 TABLET | Refills: 3 | Status: SHIPPED | OUTPATIENT
Start: 2020-12-03 | End: 2021-12-06

## 2020-12-03 NOTE — TELEPHONE ENCOUNTER
Called pt and she has taken 300mg and did well on it in the past, has taken 2 qam on her bad days recently, so will send in Rx for 300mg once daily.   Patient has an appointment with surgeon in November after EGD to review if patient can have left inguinal hernia repair.  His left inguinal hernia pain has improved since his ascites has improved, but it is still present on occasion and he is still interested in having it repaired.

## 2020-12-13 ENCOUNTER — HOSPITAL ENCOUNTER (OUTPATIENT)
Dept: GENERAL RADIOLOGY | Facility: HOSPITAL | Age: 75
Discharge: HOME | End: 2020-12-13
Admitting: FAMILY MEDICINE

## 2020-12-13 PROCEDURE — 71046 X-RAY EXAM CHEST 2 VIEWS: CPT

## 2020-12-13 PROCEDURE — U0004 COV-19 TEST NON-CDC HGH THRU: HCPCS | Performed by: FAMILY MEDICINE

## 2020-12-14 ENCOUNTER — TELEPHONE (OUTPATIENT)
Dept: INTERNAL MEDICINE | Facility: CLINIC | Age: 75
End: 2020-12-14

## 2020-12-14 ENCOUNTER — OFFICE VISIT (OUTPATIENT)
Dept: INTERNAL MEDICINE | Facility: CLINIC | Age: 75
End: 2020-12-14

## 2020-12-14 VITALS — TEMPERATURE: 97.1 F | HEIGHT: 66 IN | BODY MASS INDEX: 36.64 KG/M2 | WEIGHT: 228 LBS

## 2020-12-14 DIAGNOSIS — J45.30 MILD PERSISTENT ASTHMA WITHOUT COMPLICATION: ICD-10-CM

## 2020-12-14 DIAGNOSIS — I10 BENIGN HYPERTENSION: Primary | ICD-10-CM

## 2020-12-14 PROCEDURE — 99442 PR PHYS/QHP TELEPHONE EVALUATION 11-20 MIN: CPT | Performed by: INTERNAL MEDICINE

## 2020-12-14 RX ORDER — AZITHROMYCIN 250 MG/1
TABLET, FILM COATED ORAL
Qty: 6 TABLET | Refills: 0 | Status: SHIPPED | OUTPATIENT
Start: 2020-12-14 | End: 2021-03-16

## 2020-12-14 RX ORDER — METHYLPREDNISOLONE 4 MG/1
TABLET ORAL
Qty: 1 TABLET | Refills: 0 | Status: SHIPPED | OUTPATIENT
Start: 2020-12-14 | End: 2021-03-16

## 2020-12-14 RX ORDER — ALBUTEROL SULFATE 90 UG/1
2 AEROSOL, METERED RESPIRATORY (INHALATION) EVERY 4 HOURS PRN
Qty: 18 G | Refills: 11 | Status: SHIPPED | OUTPATIENT
Start: 2020-12-14 | End: 2022-03-21

## 2020-12-14 NOTE — PROGRESS NOTES
Subjective   Thania Casiano is a 75 y.o. female.   Chief Complaint   Patient presents with   • Cough     SOB, started a week ago, nausea, diarrhea and HA; covid tested yester day with a negative result        You have chosen to receive care through a telephone visit. Do you consent to use a telephone visit for your medical care today? Yes  Patient has had diarrhea she has had a cough she has had a headache she originally started this about Wednesday of last week she went over the weekend and get Covid tested it was reported to be negative.  She has a longstanding history of asthma and it sounds as if she is having exacerbation I suspect that we are seeing a false negative Covid test she is already been instructed to quarantine.       The following portions of the patient's history were reviewed and updated as appropriate: allergies, current medications, past family history, past medical history, past social history, past surgical history and problem list.    Review of Systems   Constitutional: Negative for activity change, appetite change, fatigue, fever, unexpected weight gain and unexpected weight loss.   HENT: Negative for swollen glands, trouble swallowing and voice change.    Eyes: Negative for blurred vision and visual disturbance.   Respiratory: Positive for cough and shortness of breath.    Cardiovascular: Negative for chest pain, palpitations and leg swelling.   Gastrointestinal: Positive for diarrhea. Negative for abdominal pain, constipation, nausea, vomiting and indigestion.   Endocrine: Negative for cold intolerance, heat intolerance, polydipsia and polyphagia.   Genitourinary: Negative for dysuria and frequency.   Musculoskeletal: Negative for arthralgias, back pain, joint swelling and neck pain.   Skin: Negative for color change, rash and skin lesions.   Neurological: Positive for headache. Negative for dizziness, weakness, memory problem and confusion.   Hematological: Does not bruise/bleed  easily.   Psychiatric/Behavioral: Negative for agitation, hallucinations and suicidal ideas. The patient is not nervous/anxious.        Objective   Past Medical History:   Diagnosis Date   • Anxiety    • Arthritis    • Asthma    • Depression    • GERD (gastroesophageal reflux disease)    • Glaucoma    • H. 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    Procedure: CERVICAL SPINAL TUMOR REMOVAL RIGHT;  Surgeon: Yoav Palencia MD;  Location: [facility] OR;  Service:    • CHOLECYSTECTOMY     • COLONOSCOPY  08/27/2015   • COLONOSCOPY N/A 9/30/2019    Procedure: COLONOSCOPY WITH ANESTHESIA;  Surgeon: Bryan Warner MD;  Location: [facility] ENDOSCOPY;  Service: Gastroenterology   • ENDOSCOPY  08/24/2010   • ENDOSCOPY N/A 9/30/2019    Procedure: ESOPHAGOGASTRODUODENOSCOPY WITH ANESTHESIA;  Surgeon: Bryan Warner MD;  Location: [facility] ENDOSCOPY;  Service: Gastroenterology   • GALLBLADDER SURGERY     • HYSTERECTOMY     • TONSILLECTOMY          Current Outpatient Medications:   •  acetaminophen (TYLENOL) 500 MG tablet, Take 500 mg by mouth 2 (Two) Times a Day., Disp: , Rfl:   •  albuterol sulfate HFA (PROAIR HFA) 108 (90 Base) MCG/ACT inhaler, Inhale 2 puffs Every 4 (Four) Hours As Needed for Wheezing., Disp: 1 inhaler, Rfl: 11  •  buPROPion XL (WELLBUTRIN XL) 300 MG 24 hr tablet, Take 1 tablet by mouth Daily., Disp: 90 tablet, Rfl: 3  •  busPIRone (BUSPAR) 5 MG tablet, Take 1 tablet by mouth 3 (Three) Times a Day. (Patient taking differently: Take 5 mg by mouth 3 (Three) Times a Day. Pt only takes once in  the evening), Disp: 90 tablet, Rfl: 2  •  calcium carbonate (OS-JUAN ANTONIO) 600 MG tablet, Take 600 mg by mouth Daily., Disp: , Rfl:   •  celecoxib (CeleBREX) 200 MG capsule, Take 1 capsule by mouth Daily. Take with food if GI upset occurs., Disp: 90 capsule, Rfl: 3  •  cloNIDine (CATAPRES) 0.1 MG tablet, TAKE 1 TABLET BY MOUTH AT BEDTIME. MAY TAKE ADDITIONAL 1 TABLET EVERY6 HOURS AS NEEDED FOR BLOOD PRESSURE GREATER THAN 160/90, Disp: 180 tablet, Rfl: 3  •  fluticasone-salmeterol (Advair Diskus) 100-50 MCG/DOSE DISKUS, Inhale 1 puff 2 (Two) Times a Day. Rinse and spit after using., Disp: 3 each, Rfl: 3  •  levothyroxine (SYNTHROID, LEVOTHROID) 100 MCG tablet, Take 1 tablet by mouth Daily. Take on empty stomach., Disp: 90 tablet, Rfl: 3  •  LUMIGAN 0.01 % ophthalmic drops, , Disp: , Rfl:   •  spironolactone (ALDACTONE) 25 MG tablet, TAKE 1 TABLET DAILY, Disp: 90 tablet, Rfl: 3  •  theophylline (THEODUR) 300 MG 12 hr tablet, TAKE 1 TABLET TWICE A DAY, Disp: 180 tablet, Rfl: 3  •  traZODone (DESYREL) 50 MG tablet, TAKE 1 TABLET AT BEDTIME, Disp: 90 tablet, Rfl: 3  •  vitamin D (ERGOCALCIFEROL) 1.25 MG (95096 UT) capsule capsule, TAKE 1 CAPSULE EVERY WEEK, Disp: 12 capsule, Rfl: 4     Vitals:    12/14/20 1616   Temp: 97.1 °F (36.2 °C)         12/14/20  1616   Weight: 103 kg (228 lb)     Patient's Body mass index is 37.36 kg/m². BMI is .      Physical Exam          Assessment/Plan   Diagnoses and all orders for this visit:    1. Mild persistent asthma without complication  -     albuterol sulfate HFA (ProAir HFA) 108 (90 Base) MCG/ACT inhaler; Inhale 2 puffs Every 4 (Four) Hours As Needed for Wheezing.  Dispense: 18 g; Refill: 11    I suspect patient is a false negative COVID-19.  Patient had much of the symptoms of COVID-19 she is already been instructed on isolation and quarantine.  Because she has asthma and she is wheezing and having some pleuritic type chest discomfort I am going to go ahead and send her in a steroid  Dosepak as well as a Z-Grant.    This visit has been rescheduled as a phone visit to comply with patient safety concerns in accordance with CDC recommendations. Total time of discussion was 12 minutes.

## 2020-12-14 NOTE — TELEPHONE ENCOUNTER
Caller: Thania Casiano Jonna    Relationship: Self    Best call back number: [phone]     What medication are you requesting: A STEROID FOR HER ASTHMA     What are your current symptoms: COUGHING, LEFT LUNG HURT     How long have you been experiencing symptoms: LAST Monday     Have you had these symptoms before:    [] Yes  [x] No    Have you been treated for these symptoms before:   [] Yes  [x] No    If a prescription is needed, what is your preferred pharmacy and phone number: [facility] [place], [place] - [address] [place] [address] - [phone] [facility] [phone] FX     Additional notes: PATIENT STATED SHE WENT TO [facility] AND RECEIVED A COVID TEST. IT CAME BACK NEGATIVE. SHE IS UNDER QUARANTINE AND WANTED TO KNOW DO THE PHARMACY DELIVERY. PLEASE CALL AND ADVISE.

## 2021-01-28 RX ORDER — LEVOTHYROXINE SODIUM 0.1 MG/1
TABLET ORAL
Qty: 90 TABLET | Refills: 3 | Status: SHIPPED | OUTPATIENT
Start: 2021-01-28 | End: 2022-01-24

## 2021-03-01 RX ORDER — CELECOXIB 200 MG/1
CAPSULE ORAL
Qty: 90 CAPSULE | Refills: 3 | Status: SHIPPED | OUTPATIENT
Start: 2021-03-01 | End: 2022-03-10 | Stop reason: SDUPTHER

## 2021-03-16 ENCOUNTER — OFFICE VISIT (OUTPATIENT)
Dept: INTERNAL MEDICINE | Facility: CLINIC | Age: 76
End: 2021-03-16

## 2021-03-16 VITALS
DIASTOLIC BLOOD PRESSURE: 90 MMHG | BODY MASS INDEX: 33.91 KG/M2 | WEIGHT: 211 LBS | HEIGHT: 66 IN | HEART RATE: 89 BPM | OXYGEN SATURATION: 99 % | TEMPERATURE: 98.2 F | SYSTOLIC BLOOD PRESSURE: 146 MMHG

## 2021-03-16 DIAGNOSIS — K59.00 CONSTIPATION, UNSPECIFIED CONSTIPATION TYPE: Primary | ICD-10-CM

## 2021-03-16 DIAGNOSIS — K21.9 GASTROESOPHAGEAL REFLUX DISEASE WITHOUT ESOPHAGITIS: ICD-10-CM

## 2021-03-16 LAB
DEVELOPER EXPIRATION DATE: NORMAL
DEVELOPER LOT NUMBER: NORMAL
EXPIRATION DATE: NORMAL
FECAL OCCULT BLOOD SCREEN, POC: NEGATIVE
Lab: NORMAL
NEGATIVE CONTROL: NEGATIVE
POSITIVE CONTROL: POSITIVE

## 2021-03-16 PROCEDURE — 99213 OFFICE O/P EST LOW 20 MIN: CPT | Performed by: NURSE PRACTITIONER

## 2021-03-16 PROCEDURE — 82270 OCCULT BLOOD FECES: CPT | Performed by: NURSE PRACTITIONER

## 2021-03-16 RX ORDER — SENNA PLUS 8.6 MG/1
2 TABLET ORAL DAILY
Qty: 60 TABLET | Refills: 1 | Status: SHIPPED | OUTPATIENT
Start: 2021-03-16 | End: 2021-05-06

## 2021-03-16 NOTE — PROGRESS NOTES
"Subjective   Thania Casiano is a 75 y.o. female.   Chief Complaint   Patient presents with   • Constipation     Hasn't been to bathroom in a week. Not eating much    • Stress     discuss Wellbutrin. Taking care of mother.        Thania is a pleasant 75-year-old female who presents the office today with ongoing constipation greater than 1 week.  Patient reports that she has been under profound amount of stress taking care of her elderly mother who has been significantly declining over the last 6 months.  She reports today that she is spoken to some nursing homes to help with her burden.  Patient states that her constipation has been a chronic issue in the past but reports \"I had not had an issue with constipation for a long time \".  She reports yesterday completing 2 fleets enemas and having 2 very small hard bowel movements.  Patient reports she had another bowel movement this morning but was small and \"pellet-like \".  She states that she has the urge to go but just has not been able to go.  She has slight abdominal pain in the lower left quadrant and upper right quadrant.  She also reports that her acid reflux has worsened since she is not been able to have a complete bowel movement.  She denies pain with bowel movement.  She denies blood or black , tarry stools.       Constipation  This is a new problem. The current episode started in the past 7 days. The problem has been waxing and waning since onset. Her stool frequency is 1 time per week or less. The stool is described as firm and formed. The patient is on a high fiber diet. She does not exercise regularly. There has been adequate water intake. Associated symptoms include abdominal pain, bloating and nausea. Pertinent negatives include no back pain, diarrhea, fecal incontinence, fever, flatus, hematochezia, melena, rectal pain, vomiting or weight loss. Risk factors include obesity, stress and immobility. She has tried enemas, manual disimpaction and " stool softeners for the symptoms. The treatment provided moderate relief.        The following portions of the patient's history were reviewed and updated as appropriate: allergies, current medications, past family history, past medical history, past social history, past surgical history and problem list.    Review of Systems   Constitutional: Negative for activity change, appetite change, chills, fatigue, fever, unexpected weight gain and unexpected weight loss.   HENT: Negative for swollen glands, trouble swallowing and voice change.    Eyes: Negative for blurred vision and visual disturbance.   Respiratory: Negative for cough and shortness of breath.    Cardiovascular: Negative for chest pain, palpitations and leg swelling.   Gastrointestinal: Positive for abdominal pain, bloating, constipation, nausea and GERD. Negative for abdominal distention, blood in stool, diarrhea, flatus, hematochezia, melena, rectal pain, vomiting and indigestion.   Endocrine: Negative for cold intolerance, heat intolerance, polydipsia and polyphagia.   Genitourinary: Negative for dysuria and frequency.   Musculoskeletal: Negative for arthralgias, back pain, joint swelling and neck pain.   Skin: Negative for color change, rash and skin lesions.   Neurological: Negative for dizziness, weakness, headache, memory problem and confusion.   Hematological: Does not bruise/bleed easily.   Psychiatric/Behavioral: Positive for decreased concentration, depressed mood and stress. Negative for agitation, hallucinations and suicidal ideas. The patient is not nervous/anxious.        Objective   Past Medical History:   Diagnosis Date   • Anxiety    • Arthritis    • Asthma    • Depression    • GERD (gastroesophageal reflux disease)    • Glaucoma    • H. 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/2018    Procedure: CERVICAL SPINAL TUMOR REMOVAL RIGHT;  Surgeon: Yoav Palencia MD;  Location: [facility] OR;  Service:    • CHOLECYSTECTOMY     • COLONOSCOPY  08/27/2015   • COLONOSCOPY N/A 9/30/2019    Procedure: COLONOSCOPY WITH ANESTHESIA;  Surgeon: Bryan Warner MD;  Location: [facility] ENDOSCOPY;  Service: Gastroenterology   • ENDOSCOPY  08/24/2010   • ENDOSCOPY N/A 9/30/2019    Procedure: ESOPHAGOGASTRODUODENOSCOPY WITH ANESTHESIA;  Surgeon: Bryan Warner MD;  Location: [facility] ENDOSCOPY;  Service: Gastroenterology   • GALLBLADDER SURGERY     • HYSTERECTOMY     • TONSILLECTOMY          Current Outpatient Medications:   •  albuterol sulfate HFA (ProAir HFA) 108 (90 Base) MCG/ACT inhaler, Inhale 2 puffs Every 4 (Four) Hours As Needed for Wheezing., Disp: 18 g, Rfl: 11  •  buPROPion XL (WELLBUTRIN XL) 300 MG 24 hr tablet, Take 1 tablet by mouth Daily., Disp: 90 tablet, Rfl: 3  •  busPIRone (BUSPAR) 5 MG tablet, Take 1 tablet by mouth 3 (Three) Times a Day. (Patient taking differently: Take 5 mg by mouth 3 (Three) Times a Day. Pt only takes once in the evening), Disp: 90 tablet, Rfl: 2  •  celecoxib (CeleBREX) 200 MG capsule, TAKE 1 CAPSULE DAILY. TAKE WITH FOOD IF GASTROINTESTINAL UPSET OCCURS, Disp: 90 capsule, Rfl: 3  •  cloNIDine (CATAPRES) 0.1 MG tablet, TAKE 1 TABLET BY MOUTH AT BEDTIME. MAY TAKE ADDITIONAL 1 TABLET EVERY6 HOURS AS NEEDED FOR BLOOD PRESSURE GREATER THAN 160/90, Disp: 180 tablet, Rfl: 3  •  fluticasone-salmeterol (Advair Diskus) 100-50 MCG/DOSE DISKUS, Inhale 1 puff 2 (Two) Times a Day. Rinse and spit after using., Disp: 3 each, Rfl: 3  •  levothyroxine (SYNTHROID, LEVOTHROID) 100 MCG tablet, TAKE 1 TABLET DAILY ON AN EMPTY STOMACH,  Disp: 90 tablet, Rfl: 3  •  LUMIGAN 0.01 % ophthalmic drops, , Disp: , Rfl:   •  spironolactone (ALDACTONE) 25 MG tablet, TAKE 1 TABLET DAILY, Disp: 90 tablet, Rfl: 3  •  traZODone (DESYREL) 50 MG tablet, TAKE 1 TABLET AT BEDTIME, Disp: 90 tablet, Rfl: 3  •  vitamin D (ERGOCALCIFEROL) 1.25 MG (62890 UT) capsule capsule, TAKE 1 CAPSULE EVERY WEEK, Disp: 12 capsule, Rfl: 4  •  acetaminophen (TYLENOL) 500 MG tablet, Take 500 mg by mouth 2 (Two) Times a Day., Disp: , Rfl:   •  calcium carbonate (OS-JUAN ANTONIO) 600 MG tablet, Take 600 mg by mouth Daily., Disp: , Rfl:   •  magnesium hydroxide (Milk of Magnesia) 400 MG/5ML suspension, Take 30 mL by mouth Daily As Needed for Constipation for 1 day, THEN 15 mL Daily for 2 days., Disp: 118 mL, Rfl: 0  •  senna (Senokot) 8.6 MG tablet, Take 2 tablets by mouth Daily., Disp: 60 tablet, Rfl: 1  •  theophylline (THEODUR) 300 MG 12 hr tablet, TAKE 1 TABLET TWICE A DAY, Disp: 180 tablet, Rfl: 3     Vitals:    03/16/21 1537   BP: 146/90   Pulse: 89   Temp: 98.2 °F (36.8 °C)   SpO2: 99%         03/16/21  1537   Weight: 95.7 kg (211 lb)     Patient's Body mass index is 34.58 kg/m². BMI is above normal parameters. Recommendations include: educational material and exercise counseling.      Physical Exam  Vitals and nursing note reviewed.   Constitutional:       Appearance: She is well-developed.   HENT:      Head: Normocephalic and atraumatic.      Right Ear: External ear normal.      Left Ear: External ear normal.      Nose: Nose normal.   Eyes:      Conjunctiva/sclera: Conjunctivae normal.      Pupils: Pupils are equal, round, and reactive to light.   Neck:      Thyroid: No thyromegaly.   Cardiovascular:      Rate and Rhythm: Normal rate and regular rhythm.      Heart sounds: Normal heart sounds.   Pulmonary:      Effort: Pulmonary effort is normal.      Breath sounds: Normal breath sounds.   Abdominal:      General: Abdomen is protuberant. A surgical scar is present. Bowel sounds are normal.  There is no distension.      Palpations: Abdomen is soft. There is no hepatomegaly, splenomegaly or mass.      Tenderness: There is abdominal tenderness in the left upper quadrant and left lower quadrant. There is no right CVA tenderness, left CVA tenderness, guarding or rebound.      Hernia: No hernia is present.       Musculoskeletal:      Cervical back: Normal range of motion and neck supple.   Lymphadenopathy:      Cervical: No cervical adenopathy.   Skin:     General: Skin is warm and dry.      Capillary Refill: Capillary refill takes less than 2 seconds.      Findings: No bruising, erythema or rash.   Neurological:      Mental Status: She is alert and oriented to person, place, and time.   Psychiatric:         Attention and Perception: Attention normal.         Mood and Affect: Mood normal.         Speech: Speech normal.         Behavior: Behavior normal. Behavior is cooperative.         Thought Content: Thought content normal.         Cognition and Memory: Cognition and memory normal.           Assessment/Plan   Diagnoses and all orders for this visit:    1. Constipation, unspecified constipation type (Primary)  -     POCT occult blood x 1 stool  -     senna (Senokot) 8.6 MG tablet; Take 2 tablets by mouth Daily.  Dispense: 60 tablet; Refill: 1  -     magnesium hydroxide (Milk of Magnesia) 400 MG/5ML suspension; Take 30 mL by mouth Daily As Needed for Constipation for 1 day, THEN 15 mL Daily for 2 days.  Dispense: 118 mL; Refill: 0    2. Gastroesophageal reflux disease without esophagitis      Patient will start senna 2 tablets tomorrow morning and 5 ml interval/30 ml, then 15 ml repeating senna dose until achieving complete BM. Patient advised if pain worsened will need to go to the ER if after hours. Will need to return to the office for reassessment sooner without bowel movement by Friday. Advised patient to call the office to 2 days. Patient will start back on Pepcid daily until constipation has resolved.  She reported unable to tolerate oral contrast for imaging and declined on proceeding with CT or KUB at this time. Would strongly encourage imaging without improvement in her constipation.

## 2021-03-18 ENCOUNTER — TELEPHONE (OUTPATIENT)
Dept: INTERNAL MEDICINE | Facility: CLINIC | Age: 76
End: 2021-03-18

## 2021-03-18 DIAGNOSIS — R10.32 LEFT LOWER QUADRANT ABDOMINAL PAIN: Primary | ICD-10-CM

## 2021-03-18 DIAGNOSIS — K59.00 CONSTIPATION, UNSPECIFIED CONSTIPATION TYPE: ICD-10-CM

## 2021-03-18 NOTE — TELEPHONE ENCOUNTER
Called and spoke with patient today. She reports no bowel movement with the therapy advised, but not able to get milk of magnesium down related to nausea. Patient was offered imaging today, but reports unable to leave her mother. She is established with Dr. Short's office and advised her to call their office to schedule acute appointment. I further persisted if her symptoms were as severe as she reports, would benefit from emergency evaluation. Patient declined.

## 2021-03-18 NOTE — TELEPHONE ENCOUNTER
PT CALLED REQUESTING A CALLBACK FROM LIZ ESPINOZA REGARDING ONGOING GASTROINTESTINAL ISSUES    CALLBACK [phone]

## 2021-03-24 ENCOUNTER — TELEPHONE (OUTPATIENT)
Dept: INTERNAL MEDICINE | Facility: CLINIC | Age: 76
End: 2021-03-24

## 2021-03-24 DIAGNOSIS — K59.00 CONSTIPATION, UNSPECIFIED CONSTIPATION TYPE: ICD-10-CM

## 2021-03-24 DIAGNOSIS — R10.32 LEFT LOWER QUADRANT ABDOMINAL PAIN: ICD-10-CM

## 2021-03-24 NOTE — TELEPHONE ENCOUNTER
PATIENT CALLED IN TO GET THE RESULTS FROM  THE CT SCAN SHE HAD DONE YESTERDAY MORNING     PLEASE CALL BACK AND ADVISE  [phone]

## 2021-03-25 ENCOUNTER — TELEPHONE (OUTPATIENT)
Dept: INTERNAL MEDICINE | Facility: CLINIC | Age: 76
End: 2021-03-25

## 2021-03-25 NOTE — TELEPHONE ENCOUNTER
----- Message from DESHAWN Ybarra sent at 3/25/2021  7:50 AM CDT -----  4 mm left lower lobe nodule; follow up Chest CT for monitoring in 6 months. No acute abdominal findings for her symptoms. No moderate stool noted. No diverticulitis. No obstructions. Would recommend follow up with Dr. Puente or the [facility] GI group and continue taking stool softeners, increase hydration. Can send her nausea medicine to take as needed.

## 2021-03-25 NOTE — TELEPHONE ENCOUNTER
Patient informed.   Pre-Visit Planning   Next 5 appointments (look out 90 days)    Feb 10, 2021 11:00 AM  (Arrive by 10:35 AM)  Adult Preventative Visit with Remy Mcknight MD  [facility] ([facility] ) [address] [place] [id number] [phone]        Appointment Notes for this encounter:   Patient would also like to discuss food allergy testing    Questionnaires Reviewed/Assigned  No additional questionnaires are needed    Patient preferred phone number: [phone]    Chart Review:  11/30/2020 VV w/ JM, per notes: Depression   Loss of Libido (F33.41) Recurrent major depressive disorder, in partial remission (H)  (primary encounter diagnosis)  Comment: work on his physical and mental determinents   Plan: consider CBT  01/21/2021 OV w/ Colon and Rectal Surgery   01/21/2021 OV w/ Gastroenterology    PHQ 10/17/2019 12/18/2019 11/30/2020   PHQ-9 Total Score 15 8 12   Q9: Thoughts of better off dead/self-harm past 2 weeks Not at all Not at all Not at all     CARLOS-7 SCORE 10/17/2019 12/18/2019 11/30/2020   Total Score - - -   Total Score - 7 (mild anxiety) -   Total Score 14 7 3     Unable to reach. Left voicemail. Advised patient to call clinic back at [phone].    Cristo CRUZ RN

## 2021-03-29 ENCOUNTER — OFFICE VISIT (OUTPATIENT)
Dept: GASTROENTEROLOGY | Facility: CLINIC | Age: 76
End: 2021-03-29

## 2021-03-29 VITALS
HEART RATE: 82 BPM | WEIGHT: 206 LBS | HEIGHT: 66 IN | TEMPERATURE: 96.2 F | SYSTOLIC BLOOD PRESSURE: 128 MMHG | BODY MASS INDEX: 33.11 KG/M2 | OXYGEN SATURATION: 96 % | DIASTOLIC BLOOD PRESSURE: 72 MMHG

## 2021-03-29 DIAGNOSIS — K21.9 GASTROESOPHAGEAL REFLUX DISEASE WITHOUT ESOPHAGITIS: ICD-10-CM

## 2021-03-29 DIAGNOSIS — R11.0 NAUSEA: Primary | ICD-10-CM

## 2021-03-29 DIAGNOSIS — R10.12 LEFT UPPER QUADRANT ABDOMINAL PAIN: ICD-10-CM

## 2021-03-29 DIAGNOSIS — K59.00 CONSTIPATION, UNSPECIFIED CONSTIPATION TYPE: ICD-10-CM

## 2021-03-29 PROCEDURE — 99214 OFFICE O/P EST MOD 30 MIN: CPT | Performed by: INTERNAL MEDICINE

## 2021-03-29 RX ORDER — PANTOPRAZOLE SODIUM 40 MG/1
40 TABLET, DELAYED RELEASE ORAL DAILY
Qty: 30 TABLET | Refills: 11 | Status: SHIPPED | OUTPATIENT
Start: 2021-03-29 | End: 2022-03-28

## 2021-03-29 NOTE — PROGRESS NOTES
[facility] Gastroenterology    Chief Complaint   Patient presents with   • Abdominal Pain     diarrhea vs constipation   • Nausea       Subjective     HPI    Thania Casiano is a 75 y.o. female who presents with a chief complaint of nausea and left upper quadrant discomfort.    She has a history of chronic nausea.  She states she is having more trouble lately.  This is associated with left upper quadrant discomfort.  She is also had trouble with obstipation.  She states one time she got impacted.  She does take MiraLAX or was taking MiraLAX once daily but that was not helping her obstipation.  This started in the last month.  She did see her primary care provider ordered a CT scan of the abdomen pelvis.  This was done at [facility].  I reviewed the report and it noted no abdominal abnormalities.  A small lung nodule was present.  She is aware of this.  She states that is an old finding and her primary is following it.    For the obstipation her primary had her take a couple over-the-counter senna tablets and about 60 mils of milk of magnesia.  She states that did clean her out to where she was having watery brown stool.  That was about a week ago.  She has not gotten back on her MiraLAX.  She denies any blood in her stools.    She thinks a lot of her symptoms may be related to anxiety and stress.  Her mother is in her 90s and is blind and suffers with Alzheimer's lives with her up until a week ago.  She states.  Her mother became too much for her to handle at home and she placed her in a nursing home.  She states she is still stressed because she cannot go CR because there is a 2-week quarantine.  She has spoken to her on the phone.  Of note, she had similar symptoms as above in September 2019.  At that time we pursued upper endoscopy and colonoscopy exams which were relatively unremarkable.    This time, she is also having increased heartburn.  She states she is taking Celebrex as well and not taking  any acid reducing medications.      Past Medical History:   Diagnosis Date   • Anxiety    • Arthritis    • Asthma    • Depression    • GERD (gastroesophageal reflux disease)    • Glaucoma    • H. pylori infection    • Hypertension    • Increased intraocular pressure    • Migraine    • Peptic ulcer    • Polymyalgia (CMS/HCC)    • Polymyalgia rheumatica (CMS/HCC)    • PONV (postoperative nausea and vomiting)    • Thyroid disease        Past Surgical History:   Procedure Laterality Date   • APPENDECTOMY     • BACK SURGERY     • BREAST BIOPSY     • CARPAL TUNNEL RELEASE     • CATARACT EXTRACTION Bilateral    • CERVICAL SPINAL CORD TUMOR REMOVAL Right 1/26/2018    Procedure: CERVICAL SPINAL TUMOR REMOVAL RIGHT;  Surgeon: Yoav Palencia MD;  Location: [facility] OR;  Service:    • CHOLECYSTECTOMY     • COLONOSCOPY  08/27/2015   • COLONOSCOPY N/A 9/30/2019    Procedure: COLONOSCOPY WITH ANESTHESIA;  Surgeon: Bryan Warner MD;  Location: [facility] ENDOSCOPY;  Service: Gastroenterology   • ENDOSCOPY  08/24/2010   • ENDOSCOPY N/A 9/30/2019    Procedure: ESOPHAGOGASTRODUODENOSCOPY WITH ANESTHESIA;  Surgeon: Bryan Warner MD;  Location: [facility] ENDOSCOPY;  Service: Gastroenterology   • GALLBLADDER SURGERY     • HYSTERECTOMY     • TONSILLECTOMY           Current Outpatient Medications:   •  acetaminophen (TYLENOL) 500 MG tablet, Take 500 mg by mouth 2 (Two) Times a Day., Disp: , Rfl:   •  albuterol sulfate HFA (ProAir HFA) 108 (90 Base) MCG/ACT inhaler, Inhale 2 puffs Every 4 (Four) Hours As Needed for Wheezing. (Patient taking differently: Inhale 2 puffs Every 4 (Four) Hours As Needed for Wheezing (rare).), Disp: 18 g, Rfl: 11  •  buPROPion XL (WELLBUTRIN XL) 300 MG 24 hr tablet, Take 1 tablet by mouth Daily., Disp: 90 tablet, Rfl: 3  •  busPIRone (BUSPAR) 5 MG tablet, Take 1 tablet by mouth 3 (Three) Times a Day. (Patient taking differently: Take 5 mg by mouth 3 (Three) Times a Day. Pt only takes once in the evening),  Disp: 90 tablet, Rfl: 2  •  calcium carbonate (OS-JUAN ANTONIO) 600 MG tablet, Take 600 mg by mouth Daily., Disp: , Rfl:   •  celecoxib (CeleBREX) 200 MG capsule, TAKE 1 CAPSULE DAILY. TAKE WITH FOOD IF GASTROINTESTINAL UPSET OCCURS, Disp: 90 capsule, Rfl: 3  •  cloNIDine (CATAPRES) 0.1 MG tablet, TAKE 1 TABLET BY MOUTH AT BEDTIME. MAY TAKE ADDITIONAL 1 TABLET EVERY6 HOURS AS NEEDED FOR BLOOD PRESSURE GREATER THAN 160/90, Disp: 180 tablet, Rfl: 3  •  fluticasone-salmeterol (Advair Diskus) 100-50 MCG/DOSE DISKUS, Inhale 1 puff 2 (Two) Times a Day. Rinse and spit after using., Disp: 3 each, Rfl: 3  •  levothyroxine (SYNTHROID, LEVOTHROID) 100 MCG tablet, TAKE 1 TABLET DAILY ON AN EMPTY STOMACH, Disp: 90 tablet, Rfl: 3  •  LUMIGAN 0.01 % ophthalmic drops, , Disp: , Rfl:   •  spironolactone (ALDACTONE) 25 MG tablet, TAKE 1 TABLET DAILY, Disp: 90 tablet, Rfl: 3  •  theophylline (THEODUR) 300 MG 12 hr tablet, TAKE 1 TABLET TWICE A DAY, Disp: 180 tablet, Rfl: 3  •  traZODone (DESYREL) 50 MG tablet, TAKE 1 TABLET AT BEDTIME, Disp: 90 tablet, Rfl: 3  •  vitamin D (ERGOCALCIFEROL) 1.25 MG (03843 UT) capsule capsule, TAKE 1 CAPSULE EVERY WEEK, Disp: 12 capsule, Rfl: 4  •  pantoprazole (PROTONIX) 40 MG EC tablet, Take 1 tablet by mouth Daily., Disp: 30 tablet, Rfl: 11  •  senna (Senokot) 8.6 MG tablet, Take 2 tablets by mouth Daily., Disp: 60 tablet, Rfl: 1    No Known Allergies    Social History     Socioeconomic History   • Marital status:      Spouse name: Not on file   • Number of children: Not on file   • Years of education: Not on file   • Highest education level: Not on file   Tobacco Use   • Smoking status: Former Smoker     Quit date:      Years since quittin.2   • Smokeless tobacco: Never Used   Substance and Sexual Activity   • Alcohol use: Yes     Comment: occ   • Drug use: No   • Sexual activity: Defer       Family History   Problem Relation Age of Onset   • Colon polyps Father    • Colon cancer Other    •  Colon cancer Paternal Uncle    • Colon cancer Paternal Uncle        Review of Systems  General no fever chills sweats  Cardiac no chest pains or palpitations  Gastrointestinal see history of present illness    Objective     Vitals:    03/29/21 1415   BP: 128/72   Pulse: 82   Temp: 96.2 °F (35.7 °C)   SpO2: 96%       Physical Exam  No acute distress. Vital signs as documented.  Sclera anicteric.  Neck without noticeable JVD. Lungs clear to auscultation. Heart exam notable for regular rhythm, normal sounds. Abdomen is soft, nontender, non distended, normal bowel sounds and without evidence of organomegaly, masses.  Neuro alert, moves extremities.        Assessment/Plan   Problem List Items Addressed This Visit        Gastrointestinal Abdominal     Gastroesophageal reflux disease without esophagitis    Relevant Medications    pantoprazole (PROTONIX) 40 MG EC tablet    Nausea - Primary    Overview     Added automatically from request for surgery [id number]         Relevant Orders    Case Request (Completed)    Constipation    Overview     Added automatically from request for surgery [id number]            Other    Left upper quadrant abdominal pain    Overview     Added automatically from request for surgery [id number]                 I reviewed her outside records as above.  She had a negative CT scan.  The symptoms are similar to what she has had in the past.  This time though she is not taking any PPI therapy and is on NSAIDs.  I therefore think to investigate the reflux disease and left upper quadrant discomfort and nausea it is reasonable to pursue upper endoscopy evaluation.  Risk of procedure discussed.  She expressed understanding and wishes to proceed with this.  We will also plan empiric PPI therapy.  I also believe that obstipation can be contributing to her nausea and left upper quadrant discomfort and indirectly her reflux.  I do recommend she get back on MiraLAX and titrate the dose up or down to where she is having  a daily bowel movement.  We discussed how to do this.  She is in agreement to give this a try.  Lastly, I did agree with her that increased anxiety can be contributing to her symptoms or could have been the trigger but it may not be all due to anxiety.  Thus we will plan for the upper endoscopy and treatment of the constipation and go from there.  She is in agreement with this approach.    Continue ongoing management by primary care provider and other specialists.     Patient's Body mass index is 33.76 kg/m². BMI is above normal parameters. Recommendations include: nutrition counseling.    The risk benefits and alternatives of endoscopy were provided  30 minutes was spent with the patient counseling, examining and reviewing records  EMR Dragon/transcription disclaimer:  Much of this encounter note is electronic transcription/translation of spoken language to printed text.  The electronic translation of spoken language may be erroneous, or at times, nonsensical words or phrases may be inadvertently transcribed.  Although I have reviewed the note for such errors, some may still exist.    Bryan Warner MD  17:17 CDT  03/29/21

## 2021-03-30 DIAGNOSIS — J45.30 MILD PERSISTENT ASTHMA WITHOUT COMPLICATION: Primary | ICD-10-CM

## 2021-03-30 RX ORDER — THEOPHYLLINE 300 MG/1
TABLET, EXTENDED RELEASE ORAL
Qty: 180 TABLET | Refills: 3 | Status: SHIPPED | OUTPATIENT
Start: 2021-03-30 | End: 2022-04-12 | Stop reason: SDUPTHER

## 2021-03-30 NOTE — TELEPHONE ENCOUNTER
Pharmacy sent a request for refills on Theophylline. Patient was last seen on 6/5/20 and has a return visit on 4/12/21.  Refill request sent to [facility] since Dr. Hughes is out of the office.

## 2021-04-05 ENCOUNTER — TRANSCRIBE ORDERS (OUTPATIENT)
Dept: LAB | Facility: HOSPITAL | Age: 76
End: 2021-04-05

## 2021-04-05 DIAGNOSIS — Z01.818 PREOPERATIVE TESTING: Primary | ICD-10-CM

## 2021-04-06 ENCOUNTER — LAB (OUTPATIENT)
Dept: LAB | Facility: HOSPITAL | Age: 76
End: 2021-04-06

## 2021-04-06 LAB — SARS-COV-2 ORF1AB RESP QL NAA+PROBE: NOT DETECTED

## 2021-04-06 PROCEDURE — U0005 INFEC AGEN DETEC AMPLI PROBE: HCPCS | Performed by: INTERNAL MEDICINE

## 2021-04-06 PROCEDURE — C9803 HOPD COVID-19 SPEC COLLECT: HCPCS | Performed by: INTERNAL MEDICINE

## 2021-04-06 PROCEDURE — U0004 COV-19 TEST NON-CDC HGH THRU: HCPCS | Performed by: INTERNAL MEDICINE

## 2021-04-09 ENCOUNTER — ANESTHESIA EVENT (OUTPATIENT)
Dept: GASTROENTEROLOGY | Facility: HOSPITAL | Age: 76
End: 2021-04-09

## 2021-04-09 ENCOUNTER — ANESTHESIA (OUTPATIENT)
Dept: GASTROENTEROLOGY | Facility: HOSPITAL | Age: 76
End: 2021-04-09

## 2021-04-09 ENCOUNTER — HOSPITAL ENCOUNTER (OUTPATIENT)
Facility: HOSPITAL | Age: 76
Setting detail: HOSPITAL OUTPATIENT SURGERY
Discharge: HOME | End: 2021-04-09
Attending: INTERNAL MEDICINE | Admitting: INTERNAL MEDICINE

## 2021-04-09 VITALS
DIASTOLIC BLOOD PRESSURE: 77 MMHG | SYSTOLIC BLOOD PRESSURE: 118 MMHG | TEMPERATURE: 99.1 F | OXYGEN SATURATION: 99 % | BODY MASS INDEX: 33.99 KG/M2 | WEIGHT: 204 LBS | RESPIRATION RATE: 11 BRPM | HEIGHT: 65 IN | HEART RATE: 73 BPM

## 2021-04-09 DIAGNOSIS — R11.0 NAUSEA: ICD-10-CM

## 2021-04-09 PROCEDURE — 43239 EGD BIOPSY SINGLE/MULTIPLE: CPT | Performed by: INTERNAL MEDICINE

## 2021-04-09 PROCEDURE — 87081 CULTURE SCREEN ONLY: CPT | Performed by: INTERNAL MEDICINE

## 2021-04-09 PROCEDURE — 25010000002 PROPOFOL 10 MG/ML EMULSION: Performed by: NURSE ANESTHETIST, CERTIFIED REGISTERED

## 2021-04-09 PROCEDURE — 88305 TISSUE EXAM BY PATHOLOGIST: CPT | Performed by: INTERNAL MEDICINE

## 2021-04-09 RX ORDER — SODIUM CHLORIDE 9 MG/ML
500 INJECTION, SOLUTION INTRAVENOUS CONTINUOUS PRN
Status: DISCONTINUED | OUTPATIENT
Start: 2021-04-09 | End: 2021-04-09 | Stop reason: HOSPADM

## 2021-04-09 RX ORDER — ONDANSETRON 2 MG/ML
4 INJECTION INTRAMUSCULAR; INTRAVENOUS ONCE AS NEEDED
Status: DISCONTINUED | OUTPATIENT
Start: 2021-04-09 | End: 2021-04-09 | Stop reason: HOSPADM

## 2021-04-09 RX ORDER — LIDOCAINE HYDROCHLORIDE 10 MG/ML
0.5 INJECTION, SOLUTION EPIDURAL; INFILTRATION; INTRACAUDAL; PERINEURAL ONCE AS NEEDED
Status: CANCELLED | OUTPATIENT
Start: 2021-04-09

## 2021-04-09 RX ORDER — PROPOFOL 10 MG/ML
VIAL (ML) INTRAVENOUS AS NEEDED
Status: DISCONTINUED | OUTPATIENT
Start: 2021-04-09 | End: 2021-04-09 | Stop reason: SURG

## 2021-04-09 RX ORDER — LIDOCAINE HYDROCHLORIDE 20 MG/ML
INJECTION, SOLUTION EPIDURAL; INFILTRATION; INTRACAUDAL; PERINEURAL AS NEEDED
Status: DISCONTINUED | OUTPATIENT
Start: 2021-04-09 | End: 2021-04-09 | Stop reason: SURG

## 2021-04-09 RX ORDER — SODIUM CHLORIDE 0.9 % (FLUSH) 0.9 %
10 SYRINGE (ML) INJECTION AS NEEDED
Status: DISCONTINUED | OUTPATIENT
Start: 2021-04-09 | End: 2021-04-09 | Stop reason: HOSPADM

## 2021-04-09 RX ADMIN — PROPOFOL 150 MG: 10 INJECTION, EMULSION INTRAVENOUS at 13:55

## 2021-04-09 RX ADMIN — LIDOCAINE HYDROCHLORIDE 100 MG: 20 INJECTION, SOLUTION EPIDURAL; INFILTRATION; INTRACAUDAL; PERINEURAL at 13:55

## 2021-04-09 RX ADMIN — SODIUM CHLORIDE 500 ML: 9 INJECTION, SOLUTION INTRAVENOUS at 11:28

## 2021-04-09 NOTE — INTERVAL H&P NOTE
H&P updated. The patient was examined and the following changes are noted:  She states she has gotten her bowels to move better with the MiraLAX.  She still having some left upper quadrant discomfort.  She presents for endoscopy exam.

## 2021-04-09 NOTE — ANESTHESIA POSTPROCEDURE EVALUATION
"Patient: Thania Casiano    Procedure Summary     Date: 04/09/21 Room / Location: [facility] ENDOSCOPY 6 /  [facility]    Anesthesia Start: 1352 Anesthesia Stop: 1406    Procedure: ESOPHAGOGASTRODUODENOSCOPY WITH ANESTHESIA (N/A ) Diagnosis:       Nausea      (Nausea [R11.0])    Surgeons: Bryan Warner MD Provider: Cassie Patterson CRNA    Anesthesia Type: MAC ASA Status: 2          Anesthesia Type: MAC    Vitals  Vitals Value Taken Time   BP     Temp     Pulse 77 04/09/21 1406   Resp     SpO2 96 % 04/09/21 1406   Vitals shown include unvalidated device data.        Post Anesthesia Care and Evaluation    Patient location during evaluation: bedside  Patient participation: complete - patient participated  Level of consciousness: awake and awake and alert  Pain score: 0  Pain management: adequate  Airway patency: patent  Anesthetic complications: No anesthetic complications  PONV Status: none  Cardiovascular status: acceptable  Respiratory status: acceptable  Hydration status: acceptable    Comments: Patient discharged according to acceptable Chuy score per RN assessment. See nursing records for further information.     Blood pressure 148/87, pulse 82, temperature 99.1 °F (37.3 °C), temperature source Temporal, resp. rate 20, height 165.1 cm (65\"), weight 92.5 kg (204 lb), SpO2 98 %, not currently breastfeeding.        "

## 2021-04-09 NOTE — ANESTHESIA PREPROCEDURE EVALUATION
Anesthesia Evaluation     Patient summary reviewed   history of anesthetic complications: PONV  NPO Solid Status: > 8 hours             Airway   Mallampati: II  TM distance: >3 FB  Neck ROM: full  Dental    (+) upper dentures and partials    Pulmonary    (+) asthma,  Cardiovascular - negative cardio ROS  Exercise tolerance: good (4-7 METS)        Neuro/Psych- negative ROS  GI/Hepatic/Renal/Endo    (+) obesity,  GERD,  thyroid problem hypothyroidism    Musculoskeletal     Abdominal    Substance History      OB/GYN          Other                        Anesthesia Plan    ASA 2     MAC       Anesthetic plan, all risks, benefits, and alternatives have been provided, discussed and informed consent has been obtained with: patient.

## 2021-04-10 LAB — UREASE TISS QL: NEGATIVE

## 2021-04-12 ENCOUNTER — OFFICE VISIT (OUTPATIENT)
Dept: PULMONOLOGY | Facility: CLINIC | Age: 76
End: 2021-04-12

## 2021-04-12 VITALS
BODY MASS INDEX: 34.66 KG/M2 | SYSTOLIC BLOOD PRESSURE: 142 MMHG | HEIGHT: 65 IN | HEART RATE: 73 BPM | WEIGHT: 208 LBS | OXYGEN SATURATION: 98 % | DIASTOLIC BLOOD PRESSURE: 86 MMHG

## 2021-04-12 DIAGNOSIS — E66.3 OVERWEIGHT: ICD-10-CM

## 2021-04-12 DIAGNOSIS — R91.1 LUNG NODULE: ICD-10-CM

## 2021-04-12 DIAGNOSIS — J45.30 MILD PERSISTENT ASTHMA WITHOUT COMPLICATION: Primary | ICD-10-CM

## 2021-04-12 LAB
CYTO UR: NORMAL
LAB AP CASE REPORT: NORMAL
PATH REPORT.FINAL DX SPEC: NORMAL
PATH REPORT.GROSS SPEC: NORMAL

## 2021-04-12 PROCEDURE — 99214 OFFICE O/P EST MOD 30 MIN: CPT | Performed by: INTERNAL MEDICINE

## 2021-04-12 RX ORDER — ERGOCALCIFEROL 1.25 MG/1
CAPSULE ORAL
Qty: 12 CAPSULE | Refills: 3 | Status: SHIPPED | OUTPATIENT
Start: 2021-04-12 | End: 2022-03-21 | Stop reason: SDUPTHER

## 2021-04-12 NOTE — PATIENT INSTRUCTIONS
Pt here for cough since Wednesday. Mom reports fever x3 days. Pt was seen by PCD office and increased qvar to 2 times per day. Pt c/o chest pain when coughing. The patient did have an abdominal scan performed through her primary healthcare provider Dr. Ashley which showed a small 4 mm nodule in the left lower lobe which was pleural-based.  This was done at Dr. Chaidez's.  She has had a previous abdominal scan showing a 4 mm nodule dating back to 2014.  I reviewed her most recent scan from [facility] from 2019 and the nodule that is noted on her current scan appears quite stable compared to the previous scan.  Based on this I do not think any routine follow-up scans would be indicated.  She is reasonably stable from the standpoint of her asthma although she did have an episode several months ago where she might of had COVID-19 although a screening throat swab was negative.  We will plan on a follow-up in 1 year.  We will hold off on pulmonary functions for now but we can perform these in the future if need be.

## 2021-04-12 NOTE — PROGRESS NOTES
Chief Complaint  mild persistent asthma without complication    Subjective    History of Present Illness {CC  Problem List  Visit Diagnosis   Encounters  Notes  Medications  Labs  Result Review Imaging  Media     Thania Casiano presents to [facility] for asthma and a lung nodule.    History of Present Illness   The patient's been under a lot of stress because she is caring for her elderly mother with Alzheimer's.  She had some GI symptoms which she attributed to stress but did have some studies performed including abdominal CT which showed a 4 mm left lower lobe nodule.  By current Fleischner guidelines this should not require any follow-up as she has a minimal smoking history.  Also she has had previous abdominal scans at [facility] and I reviewed the most recent scan from 2019 and compared it to the scan at Dr. Chaidez[facility] on the [employer] system and this nodule appears to be unchanged and again it is 4 mm in size and located against the pleural surface laterally in the left lower lobe.  On that scan it been noted to be stable dating back to 2014.  Based on the stability of the scan over this period of time and the lack of a significant smoking history I do not feel any routine follow-up scans would be indicated.  I told her I will send a copy of today's note to Dr. Ashley.  She is fairly stable from the standpoint of her asthma.  Prior to Admission medications    Medication Sig Start Date End Date Taking? Authorizing Provider   acetaminophen (TYLENOL) 500 MG tablet Take 500 mg by mouth 2 (Two) Times a Day.   Yes Provider, MD Malcolm   albuterol sulfate HFA (ProAir HFA) 108 (90 Base) MCG/ACT inhaler Inhale 2 puffs Every 4 (Four) Hours As Needed for Wheezing.  Patient taking differently: Inhale 2 puffs Every 4 (Four) Hours As Needed for Wheezing (rare). 12/14/20  Yes Davon Ashley MD   buPROPion XL (WELLBUTRIN XL) 300 MG 24 hr tablet  Take 1 tablet by mouth Daily. 12/3/20  Yes Davon Ashley MD   busPIRone (BUSPAR) 5 MG tablet Take 1 tablet by mouth 3 (Three) Times a Day.  Patient taking differently: Take 5 mg by mouth 3 (Three) Times a Day. Pt only takes once in the evening 9/11/20  Yes Zoie Sullivan APRN   calcium carbonate (OS-JUAN ANTONIO) 600 MG tablet Take 600 mg by mouth Daily.   Yes Malcolm Crowell MD   celecoxib (CeleBREX) 200 MG capsule TAKE 1 CAPSULE DAILY. TAKE WITH FOOD IF GASTROINTESTINAL UPSET OCCURS 3/1/21  Yes Davon Ashley MD   cloNIDine (CATAPRES) 0.1 MG tablet TAKE 1 TABLET BY MOUTH AT BEDTIME. MAY TAKE ADDITIONAL 1 TABLET EVERY6 HOURS AS NEEDED FOR BLOOD PRESSURE GREATER THAN 160/90 10/19/20  Yes Davon Ashley MD   fluticasone-salmeterol (Advair Diskus) 100-50 MCG/DOSE DISKUS Inhale 1 puff 2 (Two) Times a Day. Rinse and spit after using. 6/5/20  Yes Natan Hughes MD   levothyroxine (SYNTHROID, LEVOTHROID) 100 MCG tablet TAKE 1 TABLET DAILY ON AN EMPTY STOMACH 1/28/21  Yes Davon Ashley MD   LUMIGAN 0.01 % ophthalmic drops  12/14/17  Yes ProviderMalcolm MD   pantoprazole (PROTONIX) 40 MG EC tablet Take 1 tablet by mouth Daily. 3/29/21  Yes Bryan Warner MD   senna (Senokot) 8.6 MG tablet Take 2 tablets by mouth Daily. 3/16/21  Yes Cassie Peres APRN   spironolactone (ALDACTONE) 25 MG tablet TAKE 1 TABLET DAILY 6/25/20  Yes Davon Ashley MD   theophylline (THEODUR) 300 MG 12 hr tablet TAKE 1 TABLET TWICE A DAY 3/30/21  Yes Denise Quan APRN   traZODone (DESYREL) 50 MG tablet TAKE 1 TABLET AT BEDTIME 9/8/20  Yes Davon Ashley MD   vitamin D (ERGOCALCIFEROL) 1.25 MG (94629 UT) capsule capsule TAKE 1 CAPSULE EVERY WEEK 2/17/20 4/12/21 Yes Davon Ashley MD   vitamin D (ERGOCALCIFEROL) 1.25 MG (98327 UT) capsule capsule TAKE 1 CAPSULE EVERY WEEK 4/12/21   Zoie Sullivan APRN       Social History     Socioeconomic History   • Marital status:      Spouse name:  "Not on file   • Number of children: Not on file   • Years of education: Not on file   • Highest education level: Not on file   Tobacco Use   • Smoking status: Former Smoker     Packs/day: 1.00     Years: 25.00     Pack years: 25.00     Quit date:      Years since quittin.3   • Smokeless tobacco: Never Used   • Tobacco comment: off and on    Vaping Use   • Vaping Use: Never used   Substance and Sexual Activity   • Alcohol use: Yes     Comment: occ   • Drug use: No   • Sexual activity: Defer       Objective   Vital Signs:   /86   Pulse 73   Ht 165.1 cm (65\")   Wt 94.3 kg (208 lb)   SpO2 98% Comment: RA  BMI 34.61 kg/m²     Physical Exam  Vitals and nursing note reviewed.   Constitutional:       Comments: She is a pleasant somewhat overweight white female who appears in no acute distress.   HENT:      Head: Normocephalic.      Comments: She is wearing a mask.  Eyes:      Extraocular Movements: Extraocular movements intact.      Pupils: Pupils are equal, round, and reactive to light.   Cardiovascular:      Rate and Rhythm: Normal rate and regular rhythm.   Pulmonary:      Effort: Pulmonary effort is normal.      Breath sounds: Normal breath sounds.   Musculoskeletal:         General: Normal range of motion.   Skin:     General: Skin is warm and dry.   Neurological:      General: No focal deficit present.      Mental Status: She is alert and oriented to person, place, and time.   Psychiatric:         Mood and Affect: Mood normal.        Result Review :{ Labs  Result Review  Imaging  Med Tab  Media :          Results for orders placed in visit on 12/10/18    Pulmonary Function Test                 My interpretation of imaging:    CT Abdomen Pelvis Without Contrast (2021 00:00)  CT Abdomen Pelvis With Contrast (2019 13:34)  Again I reviewed her current scan and also her previous scan from [facility] from 2019.  The small left lower lobe nodule appeared stable and again is about 4 mm " in size.        Assessment and Plan {CC Problem List  Visit Diagnosis  ROS  Review (Popup)  Health Maintenance  Quality  BestPractice  Medications  SmartSets  SnapShot Encounters  Media      Diagnoses and all orders for this visit:    1. Mild persistent asthma without complication (Primary)  Assessment & Plan:  She will continue her Advair Diskus, albuterol HFA as needed, and her theophylline.          2. Lung nodule  Assessment & Plan:  This nodule appears to be stable dating back to 2014.  Based on the stability and her lack of a significant smoking history I do not feel any routine follow-up scans would be indicated.      3. Overweight  Assessment & Plan:  The patient will follow up with her primary care physician regarding her elevated BMI.        Patient's Body mass index is 34.61 kg/m². BMI is above normal parameters. Recommendations include: referral to primary care.      Natan Hughes MD  4/12/2021  13:21 CDT    Follow Up {Instructions Charge Capture  Follow-up Communications   Return in about 1 year (around 4/12/2022) for To see me specifically.    Patient was given instructions and counseling regarding her condition or for health maintenance advice. Please see specific information pulled into the AVS if appropriate.  We will plan on a follow-up visit in 1 year.  I would hold off on any routine pulmonary functions for now but should she have increased symptomatology prior to her return visit we can schedule these.  She has had both of her COVID-19 vaccines in terms of the Moderna vaccine.

## 2021-04-12 NOTE — ASSESSMENT & PLAN NOTE
This nodule appears to be stable dating back to 2014.  Based on the stability and her lack of a significant smoking history I do not feel any routine follow-up scans would be indicated.

## 2021-04-16 ENCOUNTER — TELEPHONE (OUTPATIENT)
Dept: INTERNAL MEDICINE | Facility: CLINIC | Age: 76
End: 2021-04-16

## 2021-04-16 NOTE — TELEPHONE ENCOUNTER
Last time we talked she was acutely ill with GI symptoms. No labs were ordered because no reason at that time to complete labs.The only lab I would repeat is cholesterol at this time. All her other labs in November were normal.

## 2021-04-16 NOTE — TELEPHONE ENCOUNTER
Spoke with pt on advised labs, pt states she is still having same stomach issues fatigue and light headedness.  Advised and offered pt apt but she only wanted to see em and wanted to wait the 3 weeks to see him.

## 2021-04-16 NOTE — TELEPHONE ENCOUNTER
Caller: Thania Casiano    Relationship: Self    Best call back number: [phone]    What orders are you requesting (i.e. lab or imaging):LABS    In what timeframe would the patient need to come in: ASAP    Where will you receive your lab/imaging services:IN OFFICE    Additional notes: PATIENT STATES THAT SHE WAS SUPPOSED TO GET LABS AFTER HER PREVIOUS APPOINTMENT AND NEVER GOT THEM DONE WOULD LIKE TO COME IN ON Monday TO GET THEM DONE. NO CURRENT LABS IN CHART

## 2021-04-24 DIAGNOSIS — F43.23 SITUATIONAL MIXED ANXIETY AND DEPRESSIVE DISORDER: ICD-10-CM

## 2021-04-26 RX ORDER — BUSPIRONE HYDROCHLORIDE 5 MG/1
TABLET ORAL
Qty: 90 TABLET | Refills: 5 | Status: SHIPPED | OUTPATIENT
Start: 2021-04-26 | End: 2022-05-27 | Stop reason: SDUPTHER

## 2021-05-06 ENCOUNTER — OFFICE VISIT (OUTPATIENT)
Dept: INTERNAL MEDICINE | Facility: CLINIC | Age: 76
End: 2021-05-06

## 2021-05-06 VITALS
HEART RATE: 61 BPM | SYSTOLIC BLOOD PRESSURE: 150 MMHG | WEIGHT: 205 LBS | DIASTOLIC BLOOD PRESSURE: 92 MMHG | OXYGEN SATURATION: 97 % | TEMPERATURE: 97.4 F | BODY MASS INDEX: 34.16 KG/M2 | HEIGHT: 65 IN

## 2021-05-06 DIAGNOSIS — R10.12 LEFT UPPER QUADRANT ABDOMINAL PAIN: ICD-10-CM

## 2021-05-06 DIAGNOSIS — I10 BENIGN HYPERTENSION: ICD-10-CM

## 2021-05-06 DIAGNOSIS — E11.9 ENCOUNTER FOR DIABETIC FOOT EXAM (HCC): Primary | ICD-10-CM

## 2021-05-06 LAB — HBA1C MFR BLD: 5.3 %

## 2021-05-06 PROCEDURE — 83036 HEMOGLOBIN GLYCOSYLATED A1C: CPT | Performed by: INTERNAL MEDICINE

## 2021-05-06 PROCEDURE — 99214 OFFICE O/P EST MOD 30 MIN: CPT | Performed by: INTERNAL MEDICINE

## 2021-05-06 NOTE — PROGRESS NOTES
Subjective   Thania Casiano is a 75 y.o. female.   Chief Complaint   Patient presents with   • Hypertension     6 month follow up   • Prediabetes     a1c: 5.3   • Anxiety     takes  wellbutrin 300mg and buspar 5mg but can not take buspar during the day    • Constipation     ongoing issues with upper and lower GI ; was seen by by Cassie on 03/16/2021; pt has also seen Dr. Warner on 04/09/2021   • Flank Pain     left flank pain; started about 3 months ago        History of Present Illness patient has longstanding hypertension which is elevated today likely due to stresses with having recently placed her mother in the nursing home.    The following portions of the patient's history were reviewed and updated as appropriate: allergies, current medications, past family history, past medical history, past social history, past surgical history and problem list.    Review of Systems   Constitutional: Negative for activity change, appetite change, fatigue, fever, unexpected weight gain and unexpected weight loss.   HENT: Negative for swollen glands, trouble swallowing and voice change.    Eyes: Negative for blurred vision and visual disturbance.   Respiratory: Negative for cough and shortness of breath.    Cardiovascular: Negative for chest pain, palpitations and leg swelling.   Gastrointestinal: Positive for abdominal pain ( Left upper quadrant). Negative for constipation, diarrhea, nausea, vomiting and indigestion.   Endocrine: Negative for cold intolerance, heat intolerance, polydipsia and polyphagia.   Genitourinary: Negative for dysuria and frequency.   Musculoskeletal: Negative for arthralgias, back pain, joint swelling and neck pain.   Skin: Negative for color change, rash and skin lesions.   Neurological: Negative for dizziness, weakness, headache, memory problem and confusion.   Hematological: Does not bruise/bleed easily.   Psychiatric/Behavioral: Negative for agitation, hallucinations and suicidal ideas.  The patient is not nervous/anxious.        Objective   Past Medical History:   Diagnosis Date   • Anxiety    • Arthritis    • Asthma    • Depression    • GERD (gastroesophageal reflux disease)    • Glaucoma    • H. 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    Procedure: CERVICAL SPINAL TUMOR REMOVAL RIGHT;  Surgeon: Yoav Palencia MD;  Location: [facility] OR;  Service:    • CHOLECYSTECTOMY     • COLONOSCOPY  08/27/2015   • COLONOSCOPY N/A 9/30/2019    Procedure: COLONOSCOPY WITH ANESTHESIA;  Surgeon: Bryan Warner MD;  Location: [facility] ENDOSCOPY;  Service: Gastroenterology   • ENDOSCOPY  08/24/2010   • ENDOSCOPY N/A 9/30/2019    Procedure: ESOPHAGOGASTRODUODENOSCOPY WITH ANESTHESIA;  Surgeon: Bryan Warner MD;  Location: [facility] ENDOSCOPY;  Service: Gastroenterology   • ENDOSCOPY N/A 4/9/2021    Procedure: ESOPHAGOGASTRODUODENOSCOPY WITH ANESTHESIA;  Surgeon: Bryan Warner MD;  Location: [facility] ENDOSCOPY;  Service: Gastroenterology;  Laterality: N/A;  preop; nausea  postop: small bowel diverticulum,  PCP Davon Ashley    • GALLBLADDER SURGERY     • HYSTERECTOMY     • TONSILLECTOMY          Current Outpatient Medications:   •  acetaminophen (TYLENOL) 500 MG tablet, Take 500 mg by mouth 2 (Two) Times a Day., Disp: , Rfl:   •  albuterol sulfate HFA (ProAir HFA) 108 (90 Base) MCG/ACT inhaler, Inhale 2 puffs Every 4 (Four) Hours As Needed for Wheezing. (Patient taking differently: Inhale 2 puffs Every 4 (Four) Hours As Needed for Wheezing (rare).), Disp: 18 g, Rfl: 11  •  buPROPion XL  (WELLBUTRIN XL) 300 MG 24 hr tablet, Take 1 tablet by mouth Daily., Disp: 90 tablet, Rfl: 3  •  busPIRone (BUSPAR) 5 MG tablet, TAKE 1 TABLET BY MOUTH THREE TIMES DAILY., Disp: 90 tablet, Rfl: 5  •  celecoxib (CeleBREX) 200 MG capsule, TAKE 1 CAPSULE DAILY. TAKE WITH FOOD IF GASTROINTESTINAL UPSET OCCURS, Disp: 90 capsule, Rfl: 3  •  cloNIDine (CATAPRES) 0.1 MG tablet, TAKE 1 TABLET BY MOUTH AT BEDTIME. MAY TAKE ADDITIONAL 1 TABLET EVERY6 HOURS AS NEEDED FOR BLOOD PRESSURE GREATER THAN 160/90 (Patient taking differently: Take 0.1 mg by mouth 2 (Two) Times a Day.), Disp: 180 tablet, Rfl: 3  •  fluticasone-salmeterol (Advair Diskus) 100-50 MCG/DOSE DISKUS, Inhale 1 puff 2 (Two) Times a Day. Rinse and spit after using., Disp: 3 each, Rfl: 3  •  levothyroxine (SYNTHROID, LEVOTHROID) 100 MCG tablet, TAKE 1 TABLET DAILY ON AN EMPTY STOMACH, Disp: 90 tablet, Rfl: 3  •  LUMIGAN 0.01 % ophthalmic drops, , Disp: , Rfl:   •  pantoprazole (PROTONIX) 40 MG EC tablet, Take 1 tablet by mouth Daily., Disp: 30 tablet, Rfl: 11  •  spironolactone (ALDACTONE) 25 MG tablet, TAKE 1 TABLET DAILY, Disp: 90 tablet, Rfl: 3  •  theophylline (THEODUR) 300 MG 12 hr tablet, TAKE 1 TABLET TWICE A DAY, Disp: 180 tablet, Rfl: 3  •  traZODone (DESYREL) 50 MG tablet, TAKE 1 TABLET AT BEDTIME, Disp: 90 tablet, Rfl: 3  •  vitamin D (ERGOCALCIFEROL) 1.25 MG (09269 UT) capsule capsule, TAKE 1 CAPSULE EVERY WEEK, Disp: 12 capsule, Rfl: 3     Vitals:    05/06/21 1049   BP: 150/92   Pulse: 61   Temp: 97.4 °F (36.3 °C)   SpO2: 97%         05/06/21  1049   Weight: 93 kg (205 lb)         Physical Exam  Constitutional:       Appearance: Normal appearance. She is well-developed.   HENT:      Head: Normocephalic and atraumatic.      Right Ear: External ear normal.      Left Ear: External ear normal.   Eyes:      Extraocular Movements: Extraocular movements intact.      Conjunctiva/sclera: Conjunctivae normal.      Pupils: Pupils are equal, round, and reactive to  light.   Neck:      Vascular: No carotid bruit.   Cardiovascular:      Rate and Rhythm: Normal rate and regular rhythm.      Pulses: Normal pulses.      Heart sounds: Normal heart sounds. No murmur heard.   No gallop.    Pulmonary:      Effort: Pulmonary effort is normal.      Breath sounds: Normal breath sounds.   Musculoskeletal:         General: No swelling or tenderness. Normal range of motion.      Cervical back: Normal range of motion and neck supple. No rigidity.   Skin:     General: Skin is warm and dry.   Neurological:      General: No focal deficit present.      Mental Status: She is alert and oriented to person, place, and time.   Psychiatric:         Mood and Affect: Mood normal.         Behavior: Behavior normal.               Assessment/Plan   Diagnoses and all orders for this visit:    1. Encounter for diabetic foot exam (CMS/HCC) (Primary)  -     POC Glycosylated Hemoglobin (Hb A1C)    2. Benign hypertension  -     Basic Metabolic Panel    3. Left upper quadrant abdominal pain      Patient is under quite a bit of stress having placed her mother in a nursing home is causing her blood pressure to be slightly elevated she has as needed clonidine to use for that.  I am not going to change her baseline medications today she continues to monitor blood pressure at home which is doing well in regard to her prediabetes her A1c is 5.3 she is doing well there encouraged her to continue weight loss exercise.

## 2021-05-07 LAB
BUN SERPL-MCNC: 12 MG/DL (ref 8–23)
BUN/CREAT SERPL: 12.9 (ref 7–25)
CALCIUM SERPL-MCNC: 10 MG/DL (ref 8.6–10.5)
CHLORIDE SERPL-SCNC: 106 MMOL/L (ref 98–107)
CO2 SERPL-SCNC: 26.3 MMOL/L (ref 22–29)
CREAT SERPL-MCNC: 0.93 MG/DL (ref 0.57–1)
GLUCOSE SERPL-MCNC: 104 MG/DL (ref 65–99)
POTASSIUM SERPL-SCNC: 3.8 MMOL/L (ref 3.5–5.2)
SODIUM SERPL-SCNC: 143 MMOL/L (ref 136–145)

## 2021-05-10 ENCOUNTER — TELEPHONE (OUTPATIENT)
Dept: INTERNAL MEDICINE | Facility: CLINIC | Age: 76
End: 2021-05-10

## 2021-05-10 DIAGNOSIS — Z12.31 SCREENING MAMMOGRAM, ENCOUNTER FOR: ICD-10-CM

## 2021-05-10 DIAGNOSIS — Z12.31 SCREENING MAMMOGRAM, ENCOUNTER FOR: Primary | ICD-10-CM

## 2021-05-11 ENCOUNTER — TELEPHONE (OUTPATIENT)
Dept: INTERNAL MEDICINE | Facility: CLINIC | Age: 76
End: 2021-05-11

## 2021-05-11 NOTE — TELEPHONE ENCOUNTER
----- Message from DESHAWN Vasquez sent at 5/10/2021  4:45 PM CDT -----  Negative Mammogram- repeat in 1 year

## 2021-06-01 RX ORDER — SPIRONOLACTONE 25 MG/1
TABLET ORAL
Qty: 90 TABLET | Refills: 3 | Status: SHIPPED | OUTPATIENT
Start: 2021-06-01 | End: 2022-08-30 | Stop reason: SDUPTHER

## 2021-07-27 ENCOUNTER — OFFICE VISIT (OUTPATIENT)
Dept: INTERNAL MEDICINE | Facility: CLINIC | Age: 76
End: 2021-07-27

## 2021-07-27 VITALS
SYSTOLIC BLOOD PRESSURE: 140 MMHG | BODY MASS INDEX: 34.11 KG/M2 | HEIGHT: 65 IN | OXYGEN SATURATION: 98 % | RESPIRATION RATE: 18 BRPM | HEART RATE: 89 BPM | TEMPERATURE: 97.5 F | DIASTOLIC BLOOD PRESSURE: 82 MMHG

## 2021-07-27 DIAGNOSIS — R09.81 NASAL CONGESTION: ICD-10-CM

## 2021-07-27 DIAGNOSIS — R05.8 PRODUCTIVE COUGH: Primary | ICD-10-CM

## 2021-07-27 DIAGNOSIS — J01.40 ACUTE NON-RECURRENT PANSINUSITIS: ICD-10-CM

## 2021-07-27 DIAGNOSIS — J30.1 NON-SEASONAL ALLERGIC RHINITIS DUE TO POLLEN: ICD-10-CM

## 2021-07-27 PROCEDURE — 99213 OFFICE O/P EST LOW 20 MIN: CPT | Performed by: NURSE PRACTITIONER

## 2021-07-27 RX ORDER — AMOXICILLIN AND CLAVULANATE POTASSIUM 875; 125 MG/1; MG/1
1 TABLET, FILM COATED ORAL 2 TIMES DAILY
Qty: 14 TABLET | Refills: 0 | Status: SHIPPED | OUTPATIENT
Start: 2021-07-27 | End: 2021-11-15

## 2021-07-27 RX ORDER — ECHINACEA PURPUREA EXTRACT 125 MG
1 TABLET ORAL AS NEEDED
Qty: 60 ML | Refills: 12 | Status: SHIPPED | OUTPATIENT
Start: 2021-07-27 | End: 2021-11-15

## 2021-07-27 RX ORDER — GUAIFENESIN AND DEXTROMETHORPHAN HYDROBROMIDE 600; 30 MG/1; MG/1
1 TABLET, EXTENDED RELEASE ORAL EVERY 12 HOURS SCHEDULED
Qty: 60 TABLET | Refills: 0 | Status: SHIPPED | OUTPATIENT
Start: 2021-07-27 | End: 2021-11-15

## 2021-07-27 RX ORDER — LORATADINE 10 MG/1
10 TABLET ORAL NIGHTLY
Qty: 30 TABLET | Refills: 0 | Status: SHIPPED | OUTPATIENT
Start: 2021-07-27 | End: 2021-11-15

## 2021-07-27 NOTE — PROGRESS NOTES
"Chief Complaint  URI (Pt started with runny nose, congestion, sore throat and now is coughing up yellow mucus.)    Subjective          Thania Casiano presents to [facility]  Ms. Casiano present to the office with worsening upper respiratory symptoms. Patient reports sinusitis symptoms started 7 days ago, allergic rhinitis symptoms  15 days ago. She reports chronic issues with allergies. She does not currently take any medicine at this time regularly. She is concerned on how OTC medicines will interfere with her blood pressure. She reports nasal drainage and postnasal drip is very bothersome. She reports sinus pressure has progressed to sinus pain and onset of headaches. She reports taking tyenol for headaches and is unsure if she has had a fever. She reports cough has become productive. She states coughing light yellow sputum in the AM and the rest of the day progresses to creamy white, frothy sputum. She denies chills, muscle aches, change in taste or smell. She denies sick contacts and has completed her COVID vaccine.       Objective   Vital Signs:   /82 (BP Location: Left arm, Patient Position: Sitting, Cuff Size: Adult)   Pulse 89   Temp 97.5 °F (36.4 °C) (Infrared)   Resp 18   Ht 165.1 cm (65\")   SpO2 98%   BMI 34.11 kg/m²     Physical Exam  Vitals and nursing note reviewed.   Constitutional:       Appearance: She is well-developed.   HENT:      Head: Normocephalic and atraumatic.      Right Ear: Hearing, tympanic membrane, ear canal and external ear normal.      Left Ear: Hearing, tympanic membrane, ear canal and external ear normal.      Nose: Nasal tenderness, mucosal edema, congestion and rhinorrhea present. Rhinorrhea is clear.      Right Sinus: Maxillary sinus tenderness and frontal sinus tenderness present.      Left Sinus: Maxillary sinus tenderness and frontal sinus tenderness present.      Mouth/Throat:      Lips: Pink.      Mouth: Mucous membranes " are moist.      Pharynx: Posterior oropharyngeal erythema present. No oropharyngeal exudate or uvula swelling.      Tonsils: No tonsillar exudate or tonsillar abscesses.   Eyes:      General: Lids are normal. Lids are everted, no foreign bodies appreciated. Vision grossly intact. Allergic shiner present.      Conjunctiva/sclera: Conjunctivae normal.      Pupils: Pupils are equal, round, and reactive to light.   Neck:      Thyroid: No thyromegaly.      Trachea: Trachea and phonation normal.   Cardiovascular:      Rate and Rhythm: Normal rate and regular rhythm.      Heart sounds: Normal heart sounds.   Pulmonary:      Effort: Pulmonary effort is normal.      Breath sounds: Normal breath sounds. No decreased breath sounds, wheezing or rhonchi.   Abdominal:      General: Bowel sounds are normal.      Palpations: Abdomen is soft.   Musculoskeletal:      Cervical back: Normal range of motion and neck supple.   Lymphadenopathy:      Head:      Right side of head: No submental, submandibular, tonsillar, preauricular, posterior auricular or occipital adenopathy.      Left side of head: No submental, submandibular, tonsillar, preauricular, posterior auricular or occipital adenopathy.      Cervical: No cervical adenopathy.   Skin:     General: Skin is warm and dry.      Capillary Refill: Capillary refill takes less than 2 seconds.   Neurological:      General: No focal deficit present.      Mental Status: She is alert and oriented to person, place, and time.      Deep Tendon Reflexes: Reflexes are normal and symmetric.   Psychiatric:         Attention and Perception: Attention normal.         Mood and Affect: Mood normal.         Speech: Speech normal.         Behavior: Behavior normal.         Thought Content: Thought content normal.         Cognition and Memory: Cognition and memory normal.         Judgment: Judgment normal.        Result Review :     CMP    CMP 11/5/20 5/6/21   Glucose 97 104 (A)   BUN 14 12   Creatinine  0.81 0.93   eGFR Non  Am 69 59 (A)   eGFR African Am 84 71   Sodium 139 143   Potassium 3.8 3.8   Chloride 103 106   Calcium 9.0 10.0   Total Protein 6.0    Albumin 4.20    Globulin 1.8    Total Bilirubin 0.3    Alkaline Phosphatase 89    AST (SGOT) 21    ALT (SGPT) 15    (A) Abnormal value       Comments are available for some flowsheets but are not being displayed.           CBC    CBC 8/28/20 11/5/20   WBC 4.55 3.16 (A)   RBC 4.42 4.38   Hemoglobin 13.6 13.6   Hematocrit 41.1 40.2   MCV 93.0 91.8   MCH 30.8 31.1   MCHC 33.1 33.8   RDW 12.4 13.2   Platelets 179 168   (A) Abnormal value                     Assessment and Plan    Diagnoses and all orders for this visit:    1. Productive cough (Primary)  -     guaifenesin-dextromethorphan (MUCINEX DM)  MG tablet sustained-release 12 hour tablet; Take 1 tablet by mouth Every 12 (Twelve) Hours.  Dispense: 60 tablet; Refill: 0  -     amoxicillin-clavulanate (Augmentin) 875-125 MG per tablet; Take 1 tablet by mouth 2 (Two) Times a Day.  Dispense: 14 tablet; Refill: 0    2. Acute non-recurrent pansinusitis  -     amoxicillin-clavulanate (Augmentin) 875-125 MG per tablet; Take 1 tablet by mouth 2 (Two) Times a Day.  Dispense: 14 tablet; Refill: 0    3. Nasal congestion  -     sodium chloride (Ocean Nasal Spray) 0.65 % nasal spray; 1 spray into the nostril(s) as directed by provider As Needed for Congestion.  Dispense: 60 mL; Refill: 12    4. Non-seasonal allergic rhinitis due to pollen  -     loratadine (Claritin) 10 MG tablet; Take 1 tablet by mouth Every Night.  Dispense: 30 tablet; Refill: 0      I spent 20 minutes caring for Thania on this date of service. This time includes time spent by me in the following activities:preparing for the visit, reviewing tests, obtaining and/or reviewing a separately obtained history, performing a medically appropriate examination and/or evaluation , counseling and educating the patient/family/caregiver, ordering  medications, tests, or procedures, documenting information in the medical record, independently interpreting results and communicating that information with the patient/family/caregiver and care coordination  Follow Up   No follow-ups on file.     Will start her on clarintin at night and start saline nasal spray 3 times a day to help with allergy symptoms. She has glaucoma and would not recommend flonase at this time. Will start her on augmentin related to sinusitis, started as viral and now has converted to bacterial. If symptoms have not resolved by next week can give her 3 more days of augmentin. Will have her use mucinex DM and increase water intake to 6 glasses a day.  Patient was given instructions and counseling regarding her condition or for health maintenance advice. Please see specific information pulled into the AVS if appropriate.

## 2021-08-18 DIAGNOSIS — J45.30 MILD PERSISTENT ASTHMA WITHOUT COMPLICATION: ICD-10-CM

## 2021-08-18 NOTE — TELEPHONE ENCOUNTER
Pharmacy sent a request for refills on Advair.   Rx Refill Note  Requested Prescriptions     Pending Prescriptions Disp Refills   • Advair Diskus 100-50 MCG/DOSE DISKUS [Pharmacy Med Name: ADVAIR DISKUS 60'S 100/50MCG] 180 each 3     Sig: USE 1 INHALATION TWICE A DAY (RINSE AND SPIT AFTER USING)      Last office visit with prescribing clinician: 4/12/2021      Next office visit with prescribing clinician: 4/18/2022            David Romano CMA  08/18/21, 08:55 CDT

## 2021-09-01 RX ORDER — TRAZODONE HYDROCHLORIDE 50 MG/1
TABLET ORAL
Qty: 90 TABLET | Refills: 3 | Status: SHIPPED | OUTPATIENT
Start: 2021-09-01 | End: 2022-08-29 | Stop reason: SDUPTHER

## 2021-11-12 ENCOUNTER — LAB (OUTPATIENT)
Dept: INTERNAL MEDICINE | Facility: CLINIC | Age: 76
End: 2021-11-12

## 2021-11-12 DIAGNOSIS — E55.9 VITAMIN D DEFICIENCY: ICD-10-CM

## 2021-11-12 DIAGNOSIS — E03.9 HYPOTHYROIDISM (ACQUIRED): ICD-10-CM

## 2021-11-12 DIAGNOSIS — Z79.899 ENCOUNTER FOR LONG-TERM CURRENT USE OF MEDICATION: ICD-10-CM

## 2021-11-12 DIAGNOSIS — R73.01 IMPAIRED FASTING GLUCOSE: ICD-10-CM

## 2021-11-12 DIAGNOSIS — I10 BENIGN HYPERTENSION: Primary | ICD-10-CM

## 2021-11-13 LAB
25(OH)D3+25(OH)D2 SERPL-MCNC: 63.1 NG/ML (ref 30–100)
ALBUMIN SERPL-MCNC: 4.4 G/DL (ref 3.5–5.2)
ALBUMIN/GLOB SERPL: 2.6 G/DL
ALP SERPL-CCNC: 100 U/L (ref 39–117)
ALT SERPL-CCNC: 13 U/L (ref 1–33)
APPEARANCE UR: CLEAR
AST SERPL-CCNC: 21 U/L (ref 1–32)
BACTERIA #/AREA URNS HPF: NORMAL /HPF
BASOPHILS # BLD AUTO: 0.02 10*3/MM3 (ref 0–0.2)
BASOPHILS NFR BLD AUTO: 0.5 % (ref 0–1.5)
BILIRUB SERPL-MCNC: 0.3 MG/DL (ref 0–1.2)
BILIRUB UR QL STRIP: NEGATIVE
BUN SERPL-MCNC: 18 MG/DL (ref 8–23)
BUN/CREAT SERPL: 19.6 (ref 7–25)
CALCIUM SERPL-MCNC: 9.3 MG/DL (ref 8.6–10.5)
CASTS URNS MICRO: NORMAL
CHLORIDE SERPL-SCNC: 105 MMOL/L (ref 98–107)
CHOLEST SERPL-MCNC: 170 MG/DL (ref 0–200)
CO2 SERPL-SCNC: 28.6 MMOL/L (ref 22–29)
COLOR UR: YELLOW
CREAT SERPL-MCNC: 0.92 MG/DL (ref 0.57–1)
EOSINOPHIL # BLD AUTO: 0.06 10*3/MM3 (ref 0–0.4)
EOSINOPHIL NFR BLD AUTO: 1.5 % (ref 0.3–6.2)
EPI CELLS #/AREA URNS HPF: NORMAL /HPF
ERYTHROCYTE [DISTWIDTH] IN BLOOD BY AUTOMATED COUNT: 12.8 % (ref 12.3–15.4)
GLOBULIN SER CALC-MCNC: 1.7 GM/DL
GLUCOSE SERPL-MCNC: 99 MG/DL (ref 65–99)
GLUCOSE UR QL: NEGATIVE
HBA1C MFR BLD: 4.9 % (ref 4.8–5.6)
HCT VFR BLD AUTO: 39.2 % (ref 34–46.6)
HDLC SERPL-MCNC: 60 MG/DL (ref 40–60)
HGB BLD-MCNC: 13.5 G/DL (ref 12–15.9)
HGB UR QL STRIP: NEGATIVE
IMM GRANULOCYTES # BLD AUTO: 0.01 10*3/MM3 (ref 0–0.05)
IMM GRANULOCYTES NFR BLD AUTO: 0.2 % (ref 0–0.5)
KETONES UR QL STRIP: NEGATIVE
LDLC SERPL CALC-MCNC: 96 MG/DL (ref 0–100)
LEUKOCYTE ESTERASE UR QL STRIP: NEGATIVE
LYMPHOCYTES # BLD AUTO: 1.01 10*3/MM3 (ref 0.7–3.1)
LYMPHOCYTES NFR BLD AUTO: 24.9 % (ref 19.6–45.3)
MCH RBC QN AUTO: 31.9 PG (ref 26.6–33)
MCHC RBC AUTO-ENTMCNC: 34.4 G/DL (ref 31.5–35.7)
MCV RBC AUTO: 92.7 FL (ref 79–97)
MONOCYTES # BLD AUTO: 0.38 10*3/MM3 (ref 0.1–0.9)
MONOCYTES NFR BLD AUTO: 9.4 % (ref 5–12)
NEUTROPHILS # BLD AUTO: 2.57 10*3/MM3 (ref 1.7–7)
NEUTROPHILS NFR BLD AUTO: 63.5 % (ref 42.7–76)
NITRITE UR QL STRIP: NEGATIVE
NRBC BLD AUTO-RTO: 0 /100 WBC (ref 0–0.2)
PH UR STRIP: 6 [PH] (ref 5–8)
PLATELET # BLD AUTO: 178 10*3/MM3 (ref 140–450)
POTASSIUM SERPL-SCNC: 4.4 MMOL/L (ref 3.5–5.2)
PROT SERPL-MCNC: 6.1 G/DL (ref 6–8.5)
PROT UR QL STRIP: NEGATIVE
RBC # BLD AUTO: 4.23 10*6/MM3 (ref 3.77–5.28)
RBC #/AREA URNS HPF: NORMAL /HPF
SODIUM SERPL-SCNC: 142 MMOL/L (ref 136–145)
SP GR UR: 1.02 (ref 1–1.03)
TRIGL SERPL-MCNC: 77 MG/DL (ref 0–150)
TSH SERPL DL<=0.005 MIU/L-ACNC: 1.59 UIU/ML (ref 0.27–4.2)
URATE SERPL-MCNC: 5.6 MG/DL (ref 2.4–5.7)
UROBILINOGEN UR STRIP-MCNC: NORMAL MG/DL
VLDLC SERPL CALC-MCNC: 14 MG/DL (ref 5–40)
WBC # BLD AUTO: 4.05 10*3/MM3 (ref 3.4–10.8)
WBC #/AREA URNS HPF: NORMAL /HPF

## 2021-11-15 ENCOUNTER — OFFICE VISIT (OUTPATIENT)
Dept: INTERNAL MEDICINE | Facility: CLINIC | Age: 76
End: 2021-11-15

## 2021-11-15 VITALS
WEIGHT: 202 LBS | SYSTOLIC BLOOD PRESSURE: 138 MMHG | HEART RATE: 83 BPM | OXYGEN SATURATION: 98 % | BODY MASS INDEX: 33.66 KG/M2 | TEMPERATURE: 98 F | DIASTOLIC BLOOD PRESSURE: 72 MMHG | HEIGHT: 65 IN

## 2021-11-15 DIAGNOSIS — R25.1 TREMOR: ICD-10-CM

## 2021-11-15 DIAGNOSIS — K21.9 GASTROESOPHAGEAL REFLUX DISEASE WITHOUT ESOPHAGITIS: ICD-10-CM

## 2021-11-15 DIAGNOSIS — Z12.31 SCREENING MAMMOGRAM, ENCOUNTER FOR: Primary | ICD-10-CM

## 2021-11-15 DIAGNOSIS — R07.89 CHEST WALL PAIN: ICD-10-CM

## 2021-11-15 DIAGNOSIS — J45.20 MILD INTERMITTENT ASTHMA WITHOUT COMPLICATION: ICD-10-CM

## 2021-11-15 PROCEDURE — 1159F MED LIST DOCD IN RCRD: CPT | Performed by: INTERNAL MEDICINE

## 2021-11-15 PROCEDURE — 1170F FXNL STATUS ASSESSED: CPT | Performed by: INTERNAL MEDICINE

## 2021-11-15 PROCEDURE — 99214 OFFICE O/P EST MOD 30 MIN: CPT | Performed by: INTERNAL MEDICINE

## 2021-11-15 PROCEDURE — 1125F AMNT PAIN NOTED PAIN PRSNT: CPT | Performed by: INTERNAL MEDICINE

## 2021-11-15 PROCEDURE — G0439 PPPS, SUBSEQ VISIT: HCPCS | Performed by: INTERNAL MEDICINE

## 2021-11-15 NOTE — PROGRESS NOTES
"The ABCs of the Annual Wellness Visit  Subsequent Medicare Wellness Visit    Chief Complaint   Patient presents with   • Medicare Wellness-subsequent   • Back Pain     c/o left mid back pain for several months, sometimes comes all the way thru to the front, comes to the side of her ribcage at night.  She fell in Sept, standing on a chair, and thinks pain may be due to a fall.   • Tremors     c/o \"hands trembling\", \"tics\" involving hands, and she \"moves mouth around all the time\".  Also states she is clumsy, losing her balance.   • Vein question     Has questions about veins in legs - no pain, but wants you to look at them.      Subjective    History of Present Illness:  Thania Casiano is a 76 y.o. female who presents for a Subsequent Medicare Wellness Visit.  Patient is here for annual wellness evaluation she is also having some pain in her left side from a fall she is also complaining of tremors and she has some questions about some veins that she is noticing her legs.    The following portions of the patient's history were reviewed and   updated as appropriate: allergies, current medications, past family history, past medical history, past social history, past surgical history and problem list.    Compared to one year ago, the patient feels her physical   health is the same.    Compared to one year ago, the patient feels her mental   health is the same.    Recent Hospitalizations:  She was not admitted to the hospital during the last year.       Current Medical Providers:  Patient Care Team:  Davon Ashley MD as PCP - Bryan Sher MD as Consulting Physician (Gastroenterology)  Natan Hughes MD as Consulting Physician (Pulmonary Disease)  Denise Quan APRN as Nurse Practitioner (Pulmonary Disease)  Kirsten Caballero MD as Consulting Physician (Pulmonary Disease)    Outpatient Medications Prior to Visit   Medication Sig Dispense Refill   • acetaminophen (TYLENOL) 500 MG " tablet Take 500 mg by mouth 2 (Two) Times a Day.     • Advair Diskus 100-50 MCG/DOSE DISKUS USE 1 INHALATION TWICE A DAY (RINSE AND SPIT AFTER USING) 180 each 3   • albuterol sulfate HFA (ProAir HFA) 108 (90 Base) MCG/ACT inhaler Inhale 2 puffs Every 4 (Four) Hours As Needed for Wheezing. (Patient taking differently: Inhale 2 puffs Every 4 (Four) Hours As Needed for Wheezing (rare).) 18 g 11   • buPROPion XL (WELLBUTRIN XL) 300 MG 24 hr tablet Take 1 tablet by mouth Daily. 90 tablet 3   • busPIRone (BUSPAR) 5 MG tablet TAKE 1 TABLET BY MOUTH THREE TIMES DAILY. (Patient taking differently: 5 mg. 1/2 qam, 1 qhs) 90 tablet 5   • celecoxib (CeleBREX) 200 MG capsule TAKE 1 CAPSULE DAILY. TAKE WITH FOOD IF GASTROINTESTINAL UPSET OCCURS 90 capsule 3   • cloNIDine (CATAPRES) 0.1 MG tablet TAKE 1 TABLET BY MOUTH AT BEDTIME. MAY TAKE ADDITIONAL 1 TABLET EVERY6 HOURS AS NEEDED FOR BLOOD PRESSURE GREATER THAN 160/90 (Patient taking differently: Take 0.1 mg by mouth 2 (Two) Times a Day.) 180 tablet 3   • levothyroxine (SYNTHROID, LEVOTHROID) 100 MCG tablet TAKE 1 TABLET DAILY ON AN EMPTY STOMACH 90 tablet 3   • LUMIGAN 0.01 % ophthalmic drops      • pantoprazole (PROTONIX) 40 MG EC tablet Take 1 tablet by mouth Daily. 30 tablet 11   • spironolactone (ALDACTONE) 25 MG tablet TAKE 1 TABLET DAILY 90 tablet 3   • theophylline (THEODUR) 300 MG 12 hr tablet TAKE 1 TABLET TWICE A  tablet 3   • traZODone (DESYREL) 50 MG tablet TAKE 1 TABLET AT BEDTIME 90 tablet 3   • vitamin D (ERGOCALCIFEROL) 1.25 MG (20122 UT) capsule capsule TAKE 1 CAPSULE EVERY WEEK 12 capsule 3   • amoxicillin-clavulanate (Augmentin) 875-125 MG per tablet Take 1 tablet by mouth 2 (Two) Times a Day. 14 tablet 0   • guaifenesin-dextromethorphan (MUCINEX DM)  MG tablet sustained-release 12 hour tablet Take 1 tablet by mouth Every 12 (Twelve) Hours. 60 tablet 0   • loratadine (Claritin) 10 MG tablet Take 1 tablet by mouth Every Night. 30 tablet 0   •  sodium chloride (Ocean Nasal Spray) 0.65 % nasal spray 1 spray into the nostril(s) as directed by provider As Needed for Congestion. 60 mL 12     No facility-administered medications prior to visit.       No opioid medication identified on active medication list. I have reviewed chart for other potential  high risk medication/s and harmful drug interactions in the elderly.          Aspirin is not on active medication list.  Aspirin use is not indicated based on review of current medical condition/s. Risk of harm outweighs potential benefits.  .    Patient Active Problem List   Diagnosis   • Carpal tunnel syndrome of right wrist   • Morbid obesity (HCC)   • Mass of right side of neck   • Former smoker   • BMI 35.0-35.9,adult   • Neoplasm of uncertain behavior of neck   • Non-smoker   • Benign neoplasm of neck   • BMI 38.0-38.9,adult   • Mild intermittent asthma   • Mild persistent asthma without complication   • Gastroesophageal reflux disease without esophagitis   • Family history of polyps in the colon   • Left upper quadrant abdominal pain   • Nausea   • Constipation   • Family hx of colon cancer   • Family hx colonic polyps   • Asthma   • Breast mass   • Overweight   • Vitamin D deficiency   • Anxiety and depression   • Polymyalgia rheumatica (HCC)   • Osteopenia   • Myxedema heart disease   • LAFB (left anterior fascicular block)   • Insomnia due to stress   • Impaired fasting glucose   • Hypokalemia   • Gastroenteritis   • Cervical radiculitis   • BPPV (benign paroxysmal positional vertigo), bilateral   • Benign hypertension   • Allergic-infective asthma   • Lung nodule     Advance Care Planning  Advance Directive is not on file.  ACP discussion was held with the patient during this visit. Patient does not have an advance directive, information provided.    Review of Systems   Constitutional: Negative for activity change, appetite change and fatigue.   HENT: Negative for congestion, ear discharge and mouth  "sores.    Eyes: Negative for pain and discharge.   Respiratory: Negative for apnea, cough and shortness of breath.    Cardiovascular: Positive for chest pain. Negative for palpitations and leg swelling.   Gastrointestinal: Negative for abdominal distention, abdominal pain and anal bleeding.   Endocrine: Negative for cold intolerance and polydipsia.   Genitourinary: Negative for difficulty urinating, flank pain and hematuria.   Musculoskeletal: Negative for arthralgias, gait problem and myalgias.   Skin: Negative for color change and pallor.        Distended veins   Allergic/Immunologic: Negative for environmental allergies and food allergies.   Neurological: Positive for tremors. Negative for dizziness and numbness.   Psychiatric/Behavioral: Negative for agitation, decreased concentration and hallucinations.        Objective    Vitals:    11/15/21 0924   BP: 138/72   BP Location: Left arm   Pulse: 83   Temp: 98 °F (36.7 °C)   TempSrc: Infrared   SpO2: 98%   Weight: 91.6 kg (202 lb)   Height: 165.1 cm (65\")   PainSc:   6   PainLoc: Back     BMI Readings from Last 1 Encounters:   11/15/21 33.61 kg/m²   BMI is above normal parameters. Recommendations include: exercise counseling and nutrition counseling    Does the patient have evidence of cognitive impairment? No    Physical Exam  Constitutional:       Appearance: She is well-developed.   HENT:      Head: Normocephalic and atraumatic.   Eyes:      Pupils: Pupils are equal, round, and reactive to light.   Cardiovascular:      Rate and Rhythm: Normal rate and regular rhythm.   Pulmonary:      Effort: Pulmonary effort is normal.      Breath sounds: Normal breath sounds.   Abdominal:      General: Bowel sounds are normal.      Palpations: Abdomen is soft.   Musculoskeletal:         General: Normal range of motion.      Cervical back: Normal range of motion and neck supple.   Skin:     General: Skin is warm and dry.      Comments: Spider veins both lower extremities "   Neurological:      Mental Status: She is alert and oriented to person, place, and time.   Psychiatric:         Behavior: Behavior normal.       Lab Results   Component Value Date    CHLPL 170 2021    TRIG 77 2021    HDL 60 2021    LDL 96 2021    VLDL 14 2021    HGBA1C 4.90 2021            HEALTH RISK ASSESSMENT    Smoking Status:  Social History     Tobacco Use   Smoking Status Former Smoker   • Packs/day: 1.00   • Years: 25.00   • Pack years: 25.00   • Quit date:    • Years since quittin.9   Smokeless Tobacco Never Used   Tobacco Comment    off and on      Alcohol Consumption:  Social History     Substance and Sexual Activity   Alcohol Use Not Currently     Fall Risk Screen:    BLAINE Fall Risk Assessment was completed, and patient is at HIGH risk for falls. Assessment completed on:11/15/2021    Depression Screening:  PHQ-2/PHQ-9 Depression Screening 11/15/2021   Little interest or pleasure in doing things 0   Feeling down, depressed, or hopeless 1   Total Score 1       Health Habits and Functional and Cognitive Screening:  Functional & Cognitive Status 11/15/2021   Do you have difficulty preparing food and eating? No   Do you have difficulty bathing yourself, getting dressed or grooming yourself? No   Do you have difficulty using the toilet? No   Do you have difficulty moving around from place to place? No   Do you have trouble with steps or getting out of a bed or a chair? No   Current Diet Well Balanced Diet   Dental Exam Up to date   Eye Exam Up to date   Exercise (times per week) 3 times per week   Current Exercises Include Walking   Current Exercise Activities Include -   Do you need help using the phone?  No   Are you deaf or do you have serious difficulty hearing?  No   Do you need help with transportation? No   Do you need help shopping? No   Do you need help preparing meals?  No   Do you need help with housework?  No   Do you need help with laundry? No   Do  you need help taking your medications? No   Do you need help managing money? No   Do you ever drive or ride in a car without wearing a seat belt? No   Have you felt unusual stress, anger or loneliness in the last month? No   Who do you live with? Spouse   If you need help, do you have trouble finding someone available to you? No   Have you been bothered in the last four weeks by sexual problems? No   Do you have difficulty concentrating, remembering or making decisions? Yes       Age-appropriate Screening Schedule:  Refer to the list below for future screening recommendations based on patient's age, sex and/or medical conditions. Orders for these recommended tests are listed in the plan section. The patient has been provided with a written plan.    Health Maintenance   Topic Date Due   • URINE MICROALBUMIN  Never done   • TDAP/TD VACCINES (1 - Tdap) Never done   • DIABETIC EYE EXAM  03/06/2018   • ZOSTER VACCINE (2 of 2) 01/10/2021   • DIABETIC FOOT EXAM  05/06/2022   • HEMOGLOBIN A1C  05/12/2022   • DXA SCAN  11/24/2022   • MAMMOGRAM  05/07/2023   • INFLUENZA VACCINE  Completed              Assessment/Plan   CMS Preventative Services Quick Reference  Risk Factors Identified During Encounter  Immunizations Discussed/Encouraged (specific Immunizations; Tdap, Shingrix and COVID19  The above risks/problems have been discussed with the patient.  Follow up actions/plans if indicated are seen below in the Assessment/Plan Section.  Pertinent information has been shared with the patient in the After Visit Summary.    Diagnoses and all orders for this visit:    1. Screening mammogram, encounter for (Primary)  -     Mammo Screening Digital Tomosynthesis Bilateral With CAD; Future    2. Chest wall pain    3. Gastroesophageal reflux disease without esophagitis    4. Mild intermittent asthma without complication    5. Tremor        Follow Up:   Return in about 6 months (around 5/15/2022).     An After Visit Summary and PPPS were  made available to the patient.      Patient has a hand tremor which I think is the often related.  I am having her stop her evening dose for couple weeks and then I would like for her to wean off of her a.m. dose every other day for a week or 2.  I do not think she needs theophylline at this point apparently it was continued after she was placed on Advair.  Chest wall discomfort she is having is on the left side associated with a fall I think she sprained her rib or did some injury to an intercostal nerve.  In regard to the spider veins in her legs there problematic.

## 2021-11-29 RX ORDER — CLONIDINE HYDROCHLORIDE 0.1 MG/1
0.1 TABLET ORAL 2 TIMES DAILY
Qty: 180 TABLET | Refills: 0 | Status: SHIPPED | OUTPATIENT
Start: 2021-11-29 | End: 2022-05-27 | Stop reason: SDUPTHER

## 2021-12-06 RX ORDER — BUPROPION HYDROCHLORIDE 300 MG/1
TABLET ORAL
Qty: 90 TABLET | Refills: 1 | Status: SHIPPED | OUTPATIENT
Start: 2021-12-06 | End: 2022-05-27 | Stop reason: SDUPTHER

## 2021-12-23 ENCOUNTER — APPOINTMENT (OUTPATIENT)
Dept: GENERAL RADIOLOGY | Facility: HOSPITAL | Age: 76
End: 2021-12-23

## 2021-12-23 ENCOUNTER — HOSPITAL ENCOUNTER (EMERGENCY)
Facility: HOSPITAL | Age: 76
Discharge: HOME | End: 2021-12-23
Admitting: EMERGENCY MEDICINE

## 2021-12-23 ENCOUNTER — APPOINTMENT (OUTPATIENT)
Dept: CT IMAGING | Facility: HOSPITAL | Age: 76
End: 2021-12-23

## 2021-12-23 VITALS
SYSTOLIC BLOOD PRESSURE: 136 MMHG | BODY MASS INDEX: 32.49 KG/M2 | TEMPERATURE: 98 F | HEART RATE: 80 BPM | DIASTOLIC BLOOD PRESSURE: 72 MMHG | WEIGHT: 195 LBS | HEIGHT: 65 IN | OXYGEN SATURATION: 99 % | RESPIRATION RATE: 17 BRPM

## 2021-12-23 DIAGNOSIS — R42 DIZZINESS: Primary | ICD-10-CM

## 2021-12-23 DIAGNOSIS — S50.01XA CONTUSION OF RIGHT ELBOW, INITIAL ENCOUNTER: ICD-10-CM

## 2021-12-23 DIAGNOSIS — R55 SYNCOPE, UNSPECIFIED SYNCOPE TYPE: ICD-10-CM

## 2021-12-23 LAB
ALBUMIN SERPL-MCNC: 4.2 G/DL (ref 3.5–5.2)
ALBUMIN/GLOB SERPL: 2 G/DL
ALP SERPL-CCNC: 96 U/L (ref 39–117)
ALT SERPL W P-5'-P-CCNC: 20 U/L (ref 1–33)
AMPHET+METHAMPHET UR QL: NEGATIVE
AMPHETAMINES UR QL: NEGATIVE
ANION GAP SERPL CALCULATED.3IONS-SCNC: 8 MMOL/L (ref 5–15)
APTT PPP: 28.7 SECONDS (ref 24.1–35)
AST SERPL-CCNC: 31 U/L (ref 1–32)
BARBITURATES UR QL SCN: NEGATIVE
BASOPHILS # BLD AUTO: 0.01 10*3/MM3 (ref 0–0.2)
BASOPHILS NFR BLD AUTO: 0.3 % (ref 0–1.5)
BENZODIAZ UR QL SCN: NEGATIVE
BILIRUB SERPL-MCNC: 0.5 MG/DL (ref 0–1.2)
BILIRUB UR QL STRIP: NEGATIVE
BUN SERPL-MCNC: 18 MG/DL (ref 8–23)
BUN/CREAT SERPL: 18.4 (ref 7–25)
BUPRENORPHINE SERPL-MCNC: NEGATIVE NG/ML
CALCIUM SPEC-SCNC: 9.3 MG/DL (ref 8.6–10.5)
CANNABINOIDS SERPL QL: NEGATIVE
CHLORIDE SERPL-SCNC: 104 MMOL/L (ref 98–107)
CLARITY UR: CLEAR
CO2 SERPL-SCNC: 28 MMOL/L (ref 22–29)
COCAINE UR QL: NEGATIVE
COLOR UR: YELLOW
CREAT SERPL-MCNC: 0.98 MG/DL (ref 0.57–1)
D DIMER PPP FEU-MCNC: 0.64 MG/L (FEU) (ref 0–0.5)
D-LACTATE SERPL-SCNC: 1 MMOL/L (ref 0.5–2)
DEPRECATED RDW RBC AUTO: 43.8 FL (ref 37–54)
EOSINOPHIL # BLD AUTO: 0.05 10*3/MM3 (ref 0–0.4)
EOSINOPHIL NFR BLD AUTO: 1.4 % (ref 0.3–6.2)
ERYTHROCYTE [DISTWIDTH] IN BLOOD BY AUTOMATED COUNT: 12.8 % (ref 12.3–15.4)
ETHANOL UR QL: <0.01 %
GFR SERPL CREATININE-BSD FRML MDRD: 55 ML/MIN/1.73
GLOBULIN UR ELPH-MCNC: 2.1 GM/DL
GLUCOSE SERPL-MCNC: 103 MG/DL (ref 65–99)
GLUCOSE UR STRIP-MCNC: NEGATIVE MG/DL
HCT VFR BLD AUTO: 38.7 % (ref 34–46.6)
HGB BLD-MCNC: 13.2 G/DL (ref 12–15.9)
HGB UR QL STRIP.AUTO: NEGATIVE
HOLD SPECIMEN: NORMAL
HOLD SPECIMEN: NORMAL
IMM GRANULOCYTES # BLD AUTO: 0 10*3/MM3 (ref 0–0.05)
IMM GRANULOCYTES NFR BLD AUTO: 0 % (ref 0–0.5)
INR PPP: 1.04 (ref 0.91–1.09)
KETONES UR QL STRIP: NEGATIVE
LEUKOCYTE ESTERASE UR QL STRIP.AUTO: NEGATIVE
LYMPHOCYTES # BLD AUTO: 0.76 10*3/MM3 (ref 0.7–3.1)
LYMPHOCYTES NFR BLD AUTO: 21.8 % (ref 19.6–45.3)
MAGNESIUM SERPL-MCNC: 2.3 MG/DL (ref 1.6–2.4)
MCH RBC QN AUTO: 31.8 PG (ref 26.6–33)
MCHC RBC AUTO-ENTMCNC: 34.1 G/DL (ref 31.5–35.7)
MCV RBC AUTO: 93.3 FL (ref 79–97)
METHADONE UR QL SCN: NEGATIVE
MONOCYTES # BLD AUTO: 0.45 10*3/MM3 (ref 0.1–0.9)
MONOCYTES NFR BLD AUTO: 12.9 % (ref 5–12)
NEUTROPHILS NFR BLD AUTO: 2.22 10*3/MM3 (ref 1.7–7)
NEUTROPHILS NFR BLD AUTO: 63.6 % (ref 42.7–76)
NITRITE UR QL STRIP: NEGATIVE
NRBC BLD AUTO-RTO: 0 /100 WBC (ref 0–0.2)
OPIATES UR QL: NEGATIVE
OXYCODONE UR QL SCN: NEGATIVE
PCP UR QL SCN: NEGATIVE
PH UR STRIP.AUTO: 7 [PH] (ref 5–8)
PLATELET # BLD AUTO: 177 10*3/MM3 (ref 140–450)
PMV BLD AUTO: 11.2 FL (ref 6–12)
POTASSIUM SERPL-SCNC: 3.9 MMOL/L (ref 3.5–5.2)
PROCALCITONIN SERPL-MCNC: 0.1 NG/ML (ref 0–0.25)
PROPOXYPH UR QL: NEGATIVE
PROT SERPL-MCNC: 6.3 G/DL (ref 6–8.5)
PROT UR QL STRIP: NEGATIVE
PROTHROMBIN TIME: 13.2 SECONDS (ref 11.9–14.6)
RBC # BLD AUTO: 4.15 10*6/MM3 (ref 3.77–5.28)
SARS-COV-2 RNA PNL SPEC NAA+PROBE: NOT DETECTED
SODIUM SERPL-SCNC: 140 MMOL/L (ref 136–145)
SP GR UR STRIP: 1.01 (ref 1–1.03)
T4 FREE SERPL-MCNC: 1.46 NG/DL (ref 0.93–1.7)
THEOPHYLLINE SERPL-MCNC: 12.6 MCG/ML (ref 10–20)
TRICYCLICS UR QL SCN: NEGATIVE
TROPONIN T SERPL-MCNC: <0.01 NG/ML (ref 0–0.03)
TROPONIN T SERPL-MCNC: <0.01 NG/ML (ref 0–0.03)
TSH SERPL DL<=0.05 MIU/L-ACNC: 0.9 UIU/ML (ref 0.27–4.2)
UROBILINOGEN UR QL STRIP: NORMAL
WBC NRBC COR # BLD: 3.49 10*3/MM3 (ref 3.4–10.8)
WHOLE BLOOD HOLD SPECIMEN: NORMAL
WHOLE BLOOD HOLD SPECIMEN: NORMAL

## 2021-12-23 PROCEDURE — 84443 ASSAY THYROID STIM HORMONE: CPT | Performed by: PHYSICIAN ASSISTANT

## 2021-12-23 PROCEDURE — 99284 EMERGENCY DEPT VISIT MOD MDM: CPT

## 2021-12-23 PROCEDURE — 80198 ASSAY OF THEOPHYLLINE: CPT | Performed by: PHYSICIAN ASSISTANT

## 2021-12-23 PROCEDURE — 70450 CT HEAD/BRAIN W/O DYE: CPT

## 2021-12-23 PROCEDURE — 81003 URINALYSIS AUTO W/O SCOPE: CPT | Performed by: PHYSICIAN ASSISTANT

## 2021-12-23 PROCEDURE — 36415 COLL VENOUS BLD VENIPUNCTURE: CPT

## 2021-12-23 PROCEDURE — 84439 ASSAY OF FREE THYROXINE: CPT | Performed by: PHYSICIAN ASSISTANT

## 2021-12-23 PROCEDURE — 93010 ELECTROCARDIOGRAM REPORT: CPT | Performed by: EMERGENCY MEDICINE

## 2021-12-23 PROCEDURE — 87040 BLOOD CULTURE FOR BACTERIA: CPT | Performed by: PHYSICIAN ASSISTANT

## 2021-12-23 PROCEDURE — 85025 COMPLETE CBC W/AUTO DIFF WBC: CPT

## 2021-12-23 PROCEDURE — 85379 FIBRIN DEGRADATION QUANT: CPT | Performed by: PHYSICIAN ASSISTANT

## 2021-12-23 PROCEDURE — 93005 ELECTROCARDIOGRAM TRACING: CPT

## 2021-12-23 PROCEDURE — 87635 SARS-COV-2 COVID-19 AMP PRB: CPT | Performed by: PHYSICIAN ASSISTANT

## 2021-12-23 PROCEDURE — 84145 PROCALCITONIN (PCT): CPT | Performed by: PHYSICIAN ASSISTANT

## 2021-12-23 PROCEDURE — 80306 DRUG TEST PRSMV INSTRMNT: CPT | Performed by: PHYSICIAN ASSISTANT

## 2021-12-23 PROCEDURE — 72125 CT NECK SPINE W/O DYE: CPT

## 2021-12-23 PROCEDURE — 82077 ASSAY SPEC XCP UR&BREATH IA: CPT | Performed by: PHYSICIAN ASSISTANT

## 2021-12-23 PROCEDURE — 84484 ASSAY OF TROPONIN QUANT: CPT | Performed by: PHYSICIAN ASSISTANT

## 2021-12-23 PROCEDURE — 83735 ASSAY OF MAGNESIUM: CPT

## 2021-12-23 PROCEDURE — 85610 PROTHROMBIN TIME: CPT | Performed by: PHYSICIAN ASSISTANT

## 2021-12-23 PROCEDURE — 85730 THROMBOPLASTIN TIME PARTIAL: CPT | Performed by: PHYSICIAN ASSISTANT

## 2021-12-23 PROCEDURE — 0 IOPAMIDOL PER 1 ML: Performed by: PHYSICIAN ASSISTANT

## 2021-12-23 PROCEDURE — 84484 ASSAY OF TROPONIN QUANT: CPT

## 2021-12-23 PROCEDURE — 83605 ASSAY OF LACTIC ACID: CPT | Performed by: PHYSICIAN ASSISTANT

## 2021-12-23 PROCEDURE — 71275 CT ANGIOGRAPHY CHEST: CPT

## 2021-12-23 PROCEDURE — 71045 X-RAY EXAM CHEST 1 VIEW: CPT

## 2021-12-23 PROCEDURE — 80053 COMPREHEN METABOLIC PANEL: CPT

## 2021-12-23 RX ORDER — SODIUM CHLORIDE 0.9 % (FLUSH) 0.9 %
10 SYRINGE (ML) INJECTION AS NEEDED
Status: DISCONTINUED | OUTPATIENT
Start: 2021-12-23 | End: 2021-12-23 | Stop reason: HOSPADM

## 2021-12-23 RX ORDER — MECLIZINE HYDROCHLORIDE 25 MG/1
50 TABLET ORAL ONCE
Status: DISCONTINUED | OUTPATIENT
Start: 2021-12-23 | End: 2021-12-23 | Stop reason: HOSPADM

## 2021-12-23 RX ADMIN — IOPAMIDOL 100 ML: 755 INJECTION, SOLUTION INTRAVENOUS at 16:02

## 2021-12-23 NOTE — ED PROVIDER NOTES
"Subjective   History of Present Illness    Patient is a 76-year-old female presenting to ED with dizziness and syncopal episode.  PMH significant for migraines, hypertension, arthritis, hypothyroidism, polymyalgia rheumatica, asthma, GERD, glaucoma.  Patient states she has a history of becoming dizzy during \"sinus issues.\" Patient states for the past few weeks she has had sinus pressure and congestion as well as intermittent dizziness.  Patient reports that this is not abnormal for her history however two nights ago she got up and remembers walking through her house when then her  reported to her she suddenly passed out and fell backwards striking the back of her head as well as right elbow and right buttocks on a hardwood floor.  Patient states there is a period of time before and after the fall that she does not remember however then her memory returns and she has had no confusion since.  Patient reports some soreness to the back of her head as well as mild soreness to the elbow but denies any significant pain in her buttock.  Patient states that she has chronic neck pain which has not significantly worsened since the fall.  Patient denies any preceding symptoms that she can remember.  Patient reports since the fall she has had continued dizziness which has been unchanged from the past few weeks.  Patient denies any decreased vision, blurry vision, diplopia, tinnitus, decreased hearing, chest pain/pressure/heaviness/tightness, troubles breathing, shortness of breath, nausea, vomiting, diarrhea, abdominal pain, or hematuria.  Patient denies any hemoptysis.  Patient denies any known recent sick contact.  Patient presents at this time concerned that she may have injured her head in the fall.  Patient denies medication use for any of her symptoms.    Patient completed her Covid vaccination but has not yet received a booster.  Patient received a 2021 influenza vaccine.    Records reviewed show patient was last " seen in the ED on 9/2/2019 for a right rib contusion.    No previous documented stress test or echoes.    Review of Systems   Constitutional: Negative.  Negative for diaphoresis and fever.   HENT: Positive for congestion and sinus pressure. Negative for hearing loss and tinnitus.    Eyes: Negative.  Negative for photophobia and visual disturbance.   Respiratory: Negative.  Negative for shortness of breath.    Cardiovascular: Negative.  Negative for chest pain.   Gastrointestinal: Negative for abdominal pain, nausea and vomiting.   Genitourinary: Negative.  Negative for flank pain and hematuria.   Musculoskeletal: Negative.  Negative for arthralgias, back pain, neck pain (no change in chronic) and neck stiffness.   Skin: Positive for color change (bruising right elbow).   Neurological: Positive for dizziness, syncope (x1) and headaches (posterior).   Psychiatric/Behavioral: Positive for confusion (just prior and after syncope, since resolved).   All other systems reviewed and are negative.      Past Medical History:   Diagnosis Date   • Anxiety    • Arthritis    • Asthma    • Depression    • GERD (gastroesophageal reflux disease)    • Glaucoma    • H. 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    Procedure: CERVICAL SPINAL TUMOR REMOVAL RIGHT;  Surgeon: Yoav Palencia MD;  Location: [facility] OR;  Service:    • CHOLECYSTECTOMY     • COLONOSCOPY  08/27/2015   • COLONOSCOPY N/A 9/30/2019    Procedure: COLONOSCOPY WITH ANESTHESIA;  Surgeon: Bryan Warner MD;  Location: [facility]  ENDOSCOPY;  Service: Gastroenterology   • ENDOSCOPY  2010   • ENDOSCOPY N/A 2019    Procedure: ESOPHAGOGASTRODUODENOSCOPY WITH ANESTHESIA;  Surgeon: Bryan Warner MD;  Location: [facility] ENDOSCOPY;  Service: Gastroenterology   • ENDOSCOPY N/A 2021    Procedure: ESOPHAGOGASTRODUODENOSCOPY WITH ANESTHESIA;  Surgeon: Bryan Warner MD;  Location: [facility] ENDOSCOPY;  Service: Gastroenterology;  Laterality: N/A;  preop; nausea  postop: small bowel diverticulum,  PCP Davon Ashley    • GALLBLADDER SURGERY     • HYSTERECTOMY     • TONSILLECTOMY         Family History   Problem Relation Age of Onset   • Colon polyps Father    • Colon cancer Other    • Colon cancer Paternal Uncle    • Colon cancer Paternal Uncle        Social History     Socioeconomic History   • Marital status:    Tobacco Use   • Smoking status: Former Smoker     Packs/day: 1.00     Years: 25.00     Pack years: 25.00     Quit date:      Years since quittin.0   • Smokeless tobacco: Never Used   • Tobacco comment: off and on    Vaping Use   • Vaping Use: Never used   Substance and Sexual Activity   • Alcohol use: Not Currently   • Drug use: No   • Sexual activity: Defer           Objective   Physical Exam  Vitals and nursing note reviewed.   Constitutional:       General: She is not in acute distress.     Appearance: Normal appearance. She is well-developed and well-groomed. She is not ill-appearing, toxic-appearing or diaphoretic.   HENT:      Head: Normocephalic and atraumatic.      Right Ear: Tympanic membrane, ear canal and external ear normal. No hemotympanum.      Left Ear: Tympanic membrane, ear canal and external ear normal. No hemotympanum.      Nose: Nose normal.      Mouth/Throat:      Mouth: Mucous membranes are moist.      Pharynx: Oropharynx is clear.      Comments: No intraoral, dental, or tongue injuries  Eyes:      Extraocular Movements: Extraocular movements intact.      Right eye: No nystagmus.      Left  eye: No nystagmus.      Conjunctiva/sclera: Conjunctivae normal.      Pupils: Pupils are equal, round, and reactive to light.   Cardiovascular:      Rate and Rhythm: Normal rate.   Pulmonary:      Effort: Pulmonary effort is normal. No respiratory distress.      Breath sounds: Normal breath sounds.   Abdominal:      General: Bowel sounds are normal.      Palpations: Abdomen is soft.      Tenderness: There is no abdominal tenderness.   Musculoskeletal:         General: Normal range of motion.      Cervical back: Normal, normal range of motion and neck supple. No tenderness.      Thoracic back: Normal.      Lumbar back: Normal.      Comments: Large area of bruising noted to the right posterior elbow with no decreased range of motion, no swelling, no overlying tenderness to palpitation.  All fractures are neurovascularly intact distally.  No further bruising, bony tenderness, soft tissue tenderness, joint swelling, or decreased range of motion of any joints.   Skin:     Findings: Bruising (As described MSK section) present.   Neurological:      Mental Status: She is alert.   Psychiatric:         Attention and Perception: Attention normal.         Mood and Affect: Mood and affect normal.         Speech: Speech normal.         Behavior: Behavior normal. Behavior is cooperative.         Procedures           ED Course    Micronesian SYNCOPE RISK SCORE: 3 points (medium, 8.1% risk of 30-day serious adverse event)                                                   MDM  Number of Diagnoses or Management Options     Amount and/or Complexity of Data Reviewed  Clinical lab tests: reviewed and ordered  Tests in the radiology section of CPT®: reviewed and ordered  Tests in the medicine section of CPT®: reviewed and ordered  Decide to obtain previous medical records or to obtain history from someone other than the patient: yes  Review and summarize past medical records: yes  Discuss the patient with other providers: yes (Dr. Wilhelm  Lena (attending))    Patient Progress  Patient progress: improved      Patient is a 76-year-old female presenting to ED with dizziness and syncopal episode.  PMH significant for migraines, hypertension, arthritis, hypothyroidism, polymyalgia rheumatica, asthma, GERD, glaucoma.  BC unremarkable, CMP with GFR 55 and no further acute findings.  PT/INR and APTT WNL.  Magnesium, procalcitonin, lactic, theophylline WNL.  Thyroid hormones WNL.  Alcohol testing negative.  D-dimer elevated at 0.64.  Troponin negative x2.  UDS negative.  Urinalysis with no evidence of infection or hematuria.  Covid testing negative. Head CT without contrast shows: Mild cerebral and cerebellar volume loss with chronic microvascular disease but no evidence of acute intracranial process.  Cervical spine CT shows: No evidence acute osseous injury or malalignment in the cervical spine.  Chest x-ray shows: Chronic changes, no acute findings.  Chest CTA shows: No pulmonary emboli, mild vascular calcification with no thoracic aortic aneurysm or dissection, borderline cardiomegaly, scattered hazy groundglass densities with subsegmental atelectasis versus scarring, underlying pneumonitis and/or edema considered.  Patient's dizziness self resolved and she declined IV fluids or meclizine.  Discussed with patient need for admission for further evaluation, observation, as well as echo.  Patient does admit at this time that just prior to her syncope 2 nights ago she had had 3 glasses of wine which is very abnormal for her.  Patient request to continue her evaluation on an outpatient basis and request contact her primary care provider to schedule the echocardiogram.  Once again reviewed risk, benefits, and alternatives to inpatient versus outpatient treatment and patient is alert and oriented x3, able to verbalize these, and continues to wish to follow-up on an outpatient basis.  Patient advised to very strict return precautions any for immediate return  to ED should she develop any new or worsening symptoms.  Patient had resolution of her dizziness, is ambulating at baseline with no difficulties, has no further questions, concerns, needs at this time and is stable for discharge. Case discussed with Dr. Choco Paredes who is in agreement with on further recommendations.       Final diagnoses:   Dizziness   Syncope, unspecified syncope type   Contusion of right elbow, initial encounter       ED Disposition  ED Disposition     ED Disposition Condition Comment    Discharge Stable           Davon Ashley MD  [address] [place] [address]   [facility] [id number] [phone]    Schedule an appointment as soon as possible for a visit in 2 days      [facility]  [address] [place] [id number] [phone]    As needed         Medication List      Changed    albuterol sulfate  (90 Base) MCG/ACT inhaler  Commonly known as: ProAir HFA  Inhale 2 puffs Every 4 (Four) Hours As Needed for Wheezing.  What changed: reasons to take this     busPIRone 5 MG tablet  Commonly known as: BUSPAR  TAKE 1 TABLET BY MOUTH THREE TIMES DAILY.  What changed:   · how to take this  · when to take this  · additional instructions             Zeb Vargas PA-C  12/24/21 [id number]

## 2021-12-24 NOTE — DISCHARGE INSTRUCTIONS
As we discussed it is very important that you contact your primary care provider as soon as possible to schedule an outpatient stress test.  Please continue to increase hydration, make slow positional changes, and should you develop any new or worsening symptoms please return to the ER for further evaluation.      Dizziness  Dizziness is a common problem. It is a feeling of unsteadiness or light-headedness. You may feel like you are about to faint. Dizziness can lead to injury if you stumble or fall. Anyone can become dizzy, but dizziness is more common in older adults. This condition can be caused by a number of things, including medicines, dehydration, or illness.  Follow these instructions at home:  Eating and drinking  · Drink enough fluid to keep your urine clear or pale yellow. This helps to keep you from becoming dehydrated. Try to drink more clear fluids, such as water.  · Do not drink alcohol.  · Limit your caffeine intake if told to do so by your health care provider. Check ingredients and nutrition facts to see if a food or beverage contains caffeine.  · Limit your salt (sodium) intake if told to do so by your health care provider. Check ingredients and nutrition facts to see if a food or beverage contains sodium.  Activity  · Avoid making quick movements.  ? Rise slowly from chairs and steady yourself until you feel okay.  ? In the morning, first sit up on the side of the bed. When you feel okay, stand slowly while you hold onto something until you know that your balance is fine.  · If you need to  one place for a long time, move your legs often. Tighten and relax the muscles in your legs while you are standing.  · Do not drive or use heavy machinery if you feel dizzy.  · Avoid bending down if you feel dizzy. Place items in your home so that they are easy for you to reach without leaning over.  Lifestyle  · Do not use any products that contain nicotine or tobacco, such as cigarettes and  e-cigarettes. If you need help quitting, ask your health care provider.  · Try to reduce your stress level by using methods such as yoga or meditation. Talk with your health care provider if you need help to manage your stress.  General instructions  · Watch your dizziness for any changes.  · Take over-the-counter and prescription medicines only as told by your health care provider. Talk with your health care provider if you think that your dizziness is caused by a medicine that you are taking.  · Tell a friend or a family member that you are feeling dizzy. If he or she notices any changes in your behavior, have this person call your health care provider.  · Keep all follow-up visits as told by your health care provider. This is important.  Contact a health care provider if:  · Your dizziness does not go away.  · Your dizziness or light-headedness gets worse.  · You feel nauseous.  · You have reduced hearing.  · You have new symptoms.  · You are unsteady on your feet or you feel like the room is spinning.  Get help right away if:  · You vomit or have diarrhea and are unable to eat or drink anything.  · You have problems talking, walking, swallowing, or using your arms, hands, or legs.  · You feel generally weak.  · You are not thinking clearly or you have trouble forming sentences. It may take a friend or family member to notice this.  · You have chest pain, abdominal pain, shortness of breath, or sweating.  · Your vision changes.  · You have any bleeding.  · You have a severe headache.  · You have neck pain or a stiff neck.  · You have a fever.  These symptoms may represent a serious problem that is an emergency. Do not wait to see if the symptoms will go away. Get medical help right away. Call your local emergency services (911 in the [place] Do not drive yourself to the hospital.  Summary  · Dizziness is a feeling of unsteadiness or light-headedness. This condition can be caused by a number of things, including  medicines, dehydration, or illness.  · Anyone can become dizzy, but dizziness is more common in older adults.  · Drink enough fluid to keep your urine clear or pale yellow. Do not drink alcohol.  · Avoid making quick movements if you feel dizzy. Monitor your dizziness for any changes.  This information is not intended to replace advice given to you by your health care provider. Make sure you discuss any questions you have with your health care provider.  Document Revised: 12/21/2018 Document Reviewed: 01/20/2018  [email or web address] Patient Education © 2021 [employer]        Syncope    Syncope refers to a condition in which a person temporarily loses consciousness. Syncope may also be called fainting or passing out. It is caused by a sudden decrease in blood flow to the brain. Even though most causes of syncope are not dangerous, syncope can be a sign of a serious medical problem. Your health care provider may do tests to find the reason why you are having syncope.  Signs that you may be about to faint include:  · Feeling dizzy or light-headed.  · Feeling nauseous.  · Seeing all white or all black in your field of vision.  · Having cold, clammy skin.  If you faint, get medical help right away. Call your local emergency services (911 in the [place] Do not drive yourself to the hospital.  Follow these instructions at home:  Pay attention to any changes in your symptoms. Take these actions to stay safe and to help relieve your symptoms:  Lifestyle  · Do not drive, use machinery, or play sports until your health care provider says it is okay.  · Do not drink alcohol.  · Do not use any products that contain nicotine or tobacco, such as cigarettes and e-cigarettes. If you need help quitting, ask your health care provider.  · Drink enough fluid to keep your urine pale yellow.  General instructions  · Take over-the-counter and prescription medicines only as told by your health care provider.  · If you are taking blood pressure or  heart medicine, get up slowly and take several minutes to sit and then stand. This can reduce dizziness or light-headedness.  · Have someone stay with you until you feel stable.  · If you start to feel like you might faint, lie down right away and raise (elevate) your feet above the level of your heart. Breathe deeply and steadily. Wait until all the symptoms have passed.  · Keep all follow-up visits as told by your health care provider. This is important.  Get help right away if you:  · Have a severe headache.  · Faint once or repeatedly.  · Have pain in your chest, abdomen, or back.  · Have a very fast or irregular heartbeat (palpitations).  · Have pain when you breathe.  · Are bleeding from your mouth or rectum, or you have black or tarry stool.  · Have a seizure.  · Are confused.  · Have trouble walking.  · Have severe weakness.  · Have vision problems.  These symptoms may represent a serious problem that is an emergency. Do not wait to see if your symptoms will go away. Get medical help right away. Call your local emergency services (911 in the [place] Do not drive yourself to the hospital.  Summary  · Syncope refers to a condition in which a person temporarily loses consciousness. It is caused by a sudden decrease in blood flow to the brain.  · Signs that you may be about to faint include dizziness, feeling light-headed, feeling nauseous, sudden vision changes, or cold, clammy skin.  · Although most causes of syncope are not dangerous, syncope can be a sign of a serious medical problem. If you faint, get medical help right away.  This information is not intended to replace advice given to you by your health care provider. Make sure you discuss any questions you have with your health care provider.  Document Revised: 04/29/2021 Document Reviewed: 04/29/2021  Elsevier Patient Education © 2021 [employer]

## 2021-12-27 LAB
QT INTERVAL: 396 MS
QTC INTERVAL: 445 MS

## 2021-12-28 ENCOUNTER — TELEPHONE (OUTPATIENT)
Dept: INTERNAL MEDICINE | Facility: CLINIC | Age: 76
End: 2021-12-28

## 2021-12-28 LAB
BACTERIA SPEC AEROBE CULT: NORMAL
BACTERIA SPEC AEROBE CULT: NORMAL

## 2021-12-28 NOTE — TELEPHONE ENCOUNTER
Caller: Stephenie Thania Jonna    Relationship: Self    Best call back number: [phone]    What orders are you requesting (i.e. lab or imaging):     Echocardiogram    In what timeframe would the patient need to come in:     ASAP    Where will you receive your lab/imaging services:     [facility]    Additional notes:     Patient was seen in the ER for a fall last week after a fall, and they informed her that she is needing an echocardiogram done. She states she has dizziness, and has had this for over a week. She thinks this may be associated to her sinus issues. She is requesting a prescription, so that this can be treated. Please advise.

## 2021-12-29 ENCOUNTER — OFFICE VISIT (OUTPATIENT)
Dept: INTERNAL MEDICINE | Facility: CLINIC | Age: 76
End: 2021-12-29

## 2021-12-29 VITALS
HEIGHT: 65 IN | DIASTOLIC BLOOD PRESSURE: 70 MMHG | OXYGEN SATURATION: 99 % | SYSTOLIC BLOOD PRESSURE: 126 MMHG | HEART RATE: 78 BPM | BODY MASS INDEX: 32.49 KG/M2 | TEMPERATURE: 97.1 F | WEIGHT: 195 LBS

## 2021-12-29 DIAGNOSIS — J30.89 NON-SEASONAL ALLERGIC RHINITIS, UNSPECIFIED TRIGGER: ICD-10-CM

## 2021-12-29 DIAGNOSIS — R55 SYNCOPE, UNSPECIFIED SYNCOPE TYPE: ICD-10-CM

## 2021-12-29 DIAGNOSIS — Z09 HOSPITAL DISCHARGE FOLLOW-UP: Primary | ICD-10-CM

## 2021-12-29 DIAGNOSIS — R42 DIZZINESS: ICD-10-CM

## 2021-12-29 DIAGNOSIS — R09.81 SINUS CONGESTION: ICD-10-CM

## 2021-12-29 PROCEDURE — 99214 OFFICE O/P EST MOD 30 MIN: CPT | Performed by: NURSE PRACTITIONER

## 2021-12-29 RX ORDER — MECLIZINE HCL 12.5 MG/1
12.5 TABLET ORAL 3 TIMES DAILY PRN
Qty: 30 TABLET | Refills: 0 | Status: SHIPPED | OUTPATIENT
Start: 2021-12-29 | End: 2022-04-18 | Stop reason: ALTCHOICE

## 2021-12-29 RX ORDER — PREDNISONE 20 MG/1
TABLET ORAL
Qty: 11 TABLET | Refills: 0 | Status: SHIPPED | OUTPATIENT
Start: 2021-12-29 | End: 2022-01-03

## 2021-12-29 RX ORDER — LORATADINE 10 MG/1
10 TABLET ORAL DAILY
Qty: 30 TABLET | Refills: 11 | Status: SHIPPED | OUTPATIENT
Start: 2021-12-29 | End: 2022-11-04 | Stop reason: SDUPTHER

## 2021-12-29 NOTE — PROGRESS NOTES
Subjective   Thania Casiano is a 76 y.o. female.   Chief Complaint   Patient presents with   • Dizziness     pt fell on 12/21/2021; hit back of head was dizzy when she fell; thinks she is dizzy due to sinus issues ; was seen at [facility] on 12/23/2021   • Hospital Follow Up Visit     seen in ER on 12/23/2021 due to dizziness and fell and hit her head on 12/21/2021        Thania is a pleasant 76-year-old female who presents the office today after emergency room visit on 12/23. Patient had a fall on 12/21 where her  states she was walking a few feet and suddenly passed out. She reports that she does not remember the events but does remember getting up and feeling her head hurting and her neck pain. Patient landed on right [place] and elbow. During her ER visit they completed a CT of neck without acute issues and CTA of head which showed mild cerebral and cerebellar volume loss with microvascular disease changes but nothing acute. Chest x-ray did not show any acute concerns. Did complete a CT angiogram of her chest related to a slightly elevated D-dimer. Negative for pulmonary embolism but did have some atelectasis scar tissue in the lower lobes. Patient denies any shortness of breath, fatigue, wheezing, or chronic cough. She reports that she has nasal, sinus, ear pressure and congestion. She reports that the dizziness is chronic but has worsened with sinus and ear pain 2 days prior to her fall. She reports today still having some dizziness and worse with she walks or with certain head positional changes. She reports that the spinning to the right has almost completely resolved but is still having sensation of rocking back and forth with ambulation. Patient has never been diagnosed with Ménière's disease she does report frequent ear infections as a child and does seem to have recurrent issues with vertigo. She denies regular ear ears but intermittently does. She denies any changes to her hearing but does  chronically have right ear pain. Related to her syncopal episode being sudden without prodromal symptoms ER physician had recommended she stay for an echocardiogram. Patient stated that she had been there for greater than 8 hours and would prefer to have completed outpatient. She denies any chest pain, shortness of breath, palpitations. She does have a history of murmur many years ago after using diet pills however was told by cardiologist that this had resolved. She denies elevated blood pressures at home but does not regularly check. Blood pressure is stable in the office today. Ports taking medications as prescribed.       The following portions of the patient's history were reviewed and updated as appropriate: allergies, current medications, past family history, past medical history, past social history, past surgical history and problem list.    Review of Systems   Constitutional: Negative for activity change, appetite change, fatigue, fever, unexpected weight gain and unexpected weight loss.   HENT: Positive for congestion, ear pain, sinus pressure and tinnitus. Negative for ear discharge, sneezing, swollen glands, trouble swallowing and voice change.    Eyes: Negative for blurred vision and visual disturbance.   Respiratory: Negative for cough and shortness of breath.    Cardiovascular: Negative for chest pain, palpitations and leg swelling.   Gastrointestinal: Negative for abdominal pain, constipation, diarrhea, nausea, vomiting and indigestion.   Endocrine: Negative for cold intolerance, heat intolerance, polydipsia and polyphagia.   Genitourinary: Negative for dysuria and frequency.   Musculoskeletal: Negative for arthralgias, back pain, joint swelling and neck pain.   Skin: Negative for color change, rash and skin lesions.   Neurological: Positive for dizziness. Negative for weakness, headache, memory problem and confusion.   Hematological: Does not bruise/bleed easily.   Psychiatric/Behavioral: Negative  for agitation, hallucinations and suicidal ideas. The patient is not nervous/anxious.        Objective   Past Medical History:   Diagnosis Date   • Anxiety    • Arthritis    • Asthma    • Depression    • GERD (gastroesophageal reflux disease)    • Glaucoma    • H. 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    Procedure: CERVICAL SPINAL TUMOR REMOVAL RIGHT;  Surgeon: Yoav Palencia MD;  Location: [facility] OR;  Service:    • CHOLECYSTECTOMY     • COLONOSCOPY  08/27/2015   • COLONOSCOPY N/A 9/30/2019    Procedure: COLONOSCOPY WITH ANESTHESIA;  Surgeon: Bryan Warner MD;  Location: [facility] ENDOSCOPY;  Service: Gastroenterology   • ENDOSCOPY  08/24/2010   • ENDOSCOPY N/A 9/30/2019    Procedure: ESOPHAGOGASTRODUODENOSCOPY WITH ANESTHESIA;  Surgeon: Bryan Warner MD;  Location: [facility] ENDOSCOPY;  Service: Gastroenterology   • ENDOSCOPY N/A 4/9/2021    Procedure: ESOPHAGOGASTRODUODENOSCOPY WITH ANESTHESIA;  Surgeon: Bryan Warner MD;  Location: [facility] ENDOSCOPY;  Service: Gastroenterology;  Laterality: N/A;  preop; nausea  postop: small bowel diverticulum,  PCP Davon Ashley    • GALLBLADDER SURGERY     • HYSTERECTOMY     • TONSILLECTOMY          Current Outpatient Medications:   •  acetaminophen (TYLENOL) 500 MG tablet, Take 500 mg by mouth 2 (Two) Times a Day., Disp: , Rfl:   •  Advair Diskus 100-50 MCG/DOSE DISKUS, USE 1 INHALATION TWICE A DAY (RINSE AND SPIT AFTER USING), Disp: 180 each, Rfl: 3  •  albuterol sulfate HFA (ProAir HFA) 108 (90 Base) MCG/ACT inhaler, Inhale 2 puffs Every 4 (Four)  Hours As Needed for Wheezing. (Patient taking differently: Inhale 2 puffs Every 4 (Four) Hours As Needed for Wheezing (rare).), Disp: 18 g, Rfl: 11  •  buPROPion XL (WELLBUTRIN XL) 300 MG 24 hr tablet, TAKE 1 TABLET BY MOUTH ONCE DAILY, Disp: 90 tablet, Rfl: 1  •  busPIRone (BUSPAR) 5 MG tablet, TAKE 1 TABLET BY MOUTH THREE TIMES DAILY. (Patient taking differently: 5 mg. 1 tablet in evening), Disp: 90 tablet, Rfl: 5  •  celecoxib (CeleBREX) 200 MG capsule, TAKE 1 CAPSULE DAILY. TAKE WITH FOOD IF GASTROINTESTINAL UPSET OCCURS, Disp: 90 capsule, Rfl: 3  •  cloNIDine (CATAPRES) 0.1 MG tablet, Take 1 tablet by mouth 2 (Two) Times a Day. (Patient taking differently: Take 0.1 mg by mouth Every Night.), Disp: 180 tablet, Rfl: 0  •  levothyroxine (SYNTHROID, LEVOTHROID) 100 MCG tablet, TAKE 1 TABLET DAILY ON AN EMPTY STOMACH, Disp: 90 tablet, Rfl: 3  •  LUMIGAN 0.01 % ophthalmic drops, , Disp: , Rfl:   •  pantoprazole (PROTONIX) 40 MG EC tablet, Take 1 tablet by mouth Daily., Disp: 30 tablet, Rfl: 11  •  spironolactone (ALDACTONE) 25 MG tablet, TAKE 1 TABLET DAILY, Disp: 90 tablet, Rfl: 3  •  theophylline (THEODUR) 300 MG 12 hr tablet, TAKE 1 TABLET TWICE A DAY, Disp: 180 tablet, Rfl: 3  •  traZODone (DESYREL) 50 MG tablet, TAKE 1 TABLET AT BEDTIME, Disp: 90 tablet, Rfl: 3  •  vitamin D (ERGOCALCIFEROL) 1.25 MG (58415 UT) capsule capsule, TAKE 1 CAPSULE EVERY WEEK, Disp: 12 capsule, Rfl: 3  •  loratadine (Claritin) 10 MG tablet, Take 1 tablet by mouth Daily., Disp: 30 tablet, Rfl: 11  •  meclizine (ANTIVERT) 12.5 MG tablet, Take 1 tablet by mouth 3 (Three) Times a Day As Needed for Dizziness., Disp: 30 tablet, Rfl: 0  •  predniSONE (DELTASONE) 20 MG tablet, Take 1 tablet by mouth 3 (Three) Times a Day for 2 days, THEN 1 tablet 2 (Two) Times a Day for 2 days, THEN 0.5 tablets 2 (Two) Times a Day for 1 day., Disp: 11 tablet, Rfl: 0      Vitals:    12/29/21 0944   BP: 126/70   Pulse: 78   Temp: 97.1 °F (36.2 °C)   SpO2: 99%          12/29/21  0944   Weight: 88.5 kg (195 lb)       Body mass index is 32.45 kg/m².    Physical Exam  Vitals and nursing note reviewed.   Constitutional:       Appearance: She is well-developed and well-groomed.   HENT:      Head: Normocephalic and atraumatic.      Right Ear: Hearing, ear canal and external ear normal. Tympanic membrane is injected and retracted. Tympanic membrane is not scarred.      Left Ear: Hearing, ear canal and external ear normal. Tympanic membrane is injected and bulging.      Nose: Congestion present.      Right Sinus: Maxillary sinus tenderness present.      Left Sinus: Maxillary sinus tenderness present.      Mouth/Throat:      Lips: Pink.      Mouth: Mucous membranes are moist.      Pharynx: Oropharynx is clear. Uvula midline.   Eyes:      General: Lids are normal. Lids are everted, no foreign bodies appreciated. Vision grossly intact.      Conjunctiva/sclera: Conjunctivae normal.      Pupils: Pupils are equal, round, and reactive to light.   Neck:      Thyroid: No thyromegaly.      Vascular: No carotid bruit.   Cardiovascular:      Rate and Rhythm: Normal rate and regular rhythm.      Pulses: Normal pulses.      Heart sounds: Normal heart sounds. No murmur heard.      Pulmonary:      Effort: Pulmonary effort is normal.      Breath sounds: Normal breath sounds. No decreased breath sounds or wheezing.   Abdominal:      General: Bowel sounds are normal.      Palpations: Abdomen is soft.   Musculoskeletal:      Cervical back: Normal range of motion and neck supple.      Right lower leg: No edema.      Left lower leg: No edema.   Lymphadenopathy:      Cervical: No cervical adenopathy.   Skin:     General: Skin is warm and dry.   Neurological:      Mental Status: She is alert and oriented to person, place, and time.   Psychiatric:         Behavior: Behavior normal. Behavior is cooperative.         Thought Content: Thought content normal.               Assessment/Plan   Diagnoses and all  orders for this visit:    1. Hospital discharge follow-up (Primary)    2. Syncope, unspecified syncope type  -     Adult Transthoracic Echo Complete W/ Cont if Necessary Per Protocol; Future    3. Dizziness  -     predniSONE (DELTASONE) 20 MG tablet; Take 1 tablet by mouth 3 (Three) Times a Day for 2 days, THEN 1 tablet 2 (Two) Times a Day for 2 days, THEN 0.5 tablets 2 (Two) Times a Day for 1 day.  Dispense: 11 tablet; Refill: 0  -     meclizine (ANTIVERT) 12.5 MG tablet; Take 1 tablet by mouth 3 (Three) Times a Day As Needed for Dizziness.  Dispense: 30 tablet; Refill: 0    4. Sinus congestion  -     predniSONE (DELTASONE) 20 MG tablet; Take 1 tablet by mouth 3 (Three) Times a Day for 2 days, THEN 1 tablet 2 (Two) Times a Day for 2 days, THEN 0.5 tablets 2 (Two) Times a Day for 1 day.  Dispense: 11 tablet; Refill: 0  -     loratadine (Claritin) 10 MG tablet; Take 1 tablet by mouth Daily.  Dispense: 30 tablet; Refill: 11    5. Non-seasonal allergic rhinitis, unspecified trigger  -     loratadine (Claritin) 10 MG tablet; Take 1 tablet by mouth Daily.  Dispense: 30 tablet; Refill: 11        Related to syncopal episode we will send her for echocardiogram and compare this against for echo in the past. If any abnormalities will talk with patient on the phone and possibly see in the office for further discussion however would most likely recommend her follow-up with a cardiologist. She states that she has seen Dr. Payne in the past.    Patient does have some swelling in her left ear we will go ahead and give her a short course of prednisone to help with intermittent headache and dizziness. Will also give her meclizine to take as needed. Advised her about the drowsy effects of Antivert. Patient verbalizes understanding. Patient did have some improvement in her chronic dizziness when taking Claritin. Ran out of prescriptions that she had stopped using, we will start her back on Claritin give her plenty refills to take  every day. Can take it at night.    Educated her to increase her hydration and watch her salt intake. Labs were stable without significant electrolyte impairments will consider repeating if needed. No signs of anemia. No signs of Covid. Negative troponins in the ER. No family history of clotting or bleeding disorder. Will consider testing patient for clotting disorder if patient has recurring issues related to elevated D-dimer

## 2022-01-14 ENCOUNTER — OFFICE VISIT (OUTPATIENT)
Dept: INTERNAL MEDICINE | Facility: CLINIC | Age: 77
End: 2022-01-14

## 2022-01-14 VITALS
DIASTOLIC BLOOD PRESSURE: 66 MMHG | BODY MASS INDEX: 33.99 KG/M2 | HEART RATE: 62 BPM | SYSTOLIC BLOOD PRESSURE: 138 MMHG | TEMPERATURE: 96.9 F | WEIGHT: 204 LBS | OXYGEN SATURATION: 99 % | HEIGHT: 65 IN

## 2022-01-14 DIAGNOSIS — R07.89 INTERMITTENT LEFT-SIDED CHEST PAIN: ICD-10-CM

## 2022-01-14 DIAGNOSIS — I44.4 LAFB (LEFT ANTERIOR FASCICULAR BLOCK): ICD-10-CM

## 2022-01-14 DIAGNOSIS — Z20.822 LAB TEST NEGATIVE FOR COVID-19 VIRUS: ICD-10-CM

## 2022-01-14 DIAGNOSIS — J18.9 PNEUMONIA OF LEFT LOWER LOBE DUE TO INFECTIOUS ORGANISM: Primary | ICD-10-CM

## 2022-01-14 PROCEDURE — 93000 ELECTROCARDIOGRAM COMPLETE: CPT | Performed by: NURSE PRACTITIONER

## 2022-01-14 PROCEDURE — 99214 OFFICE O/P EST MOD 30 MIN: CPT | Performed by: NURSE PRACTITIONER

## 2022-01-14 RX ORDER — DOXYCYCLINE HYCLATE 100 MG/1
100 CAPSULE ORAL 2 TIMES DAILY
Qty: 20 CAPSULE | Refills: 0 | Status: SHIPPED | OUTPATIENT
Start: 2022-01-14 | End: 2022-04-18

## 2022-01-14 NOTE — PROGRESS NOTES
"Chief Complaint  Follow-up (last seen 12/29/2021; pt is still having some dizziness and sinus issues seem to be doing better )    Subjective          Thania Casiano presents to [facility]  Thania returns to the office today after her ER follow-up on 1229 for dizziness, syncopal episode ER physicians recommended echocardiogram and patient was displeased with ER stay that she decided not to be hospitalized to complete an hospital.  Patient is also reporting intermittent left-sided chest wall pain.  She reports chest pain under left breast and radiates to thoracic back at same level.  She denies pain is worsened with movement.  She does think intermittently pain is related to taking deep breaths then.  She has lung scarring related to previous pneumonias as shown in CT of chest completed in ER at last visit.  However, could not rule out that she did not have pneumonia-itis but did not start antibiotics because patient's symptoms were improving at last visit.  She denies jaw, left arm, substernal pain, diaphoresis, nausea with chest pain episodes.  She reports the chest pain is a dull ache that lasts for several minutes.  She states \"I usually just go to sleep and is gone by the morning \".  She denies increased fatigue but is still having intermittent issues with dizziness.  She does report some improvement in her dizziness with the steroids started last visit.  She is reporting persistent nasal sinus congestion and ear fullness all the time.    Patient has had 2 recent falls and was told in the past by Dr. Ashley that thoracic back pain most likely related to damaging nerves in her thoracic spine from falling on her back.  Patient states that chest wall pain is not always associated with back pain but is concerned about cardiac origins since syncopal episode.  Patient syncopal episode also could have been related to her drinking more than usual and getting up from standing " "quickly.  However, cardiac cause has not been ruled out, she is scheduled for Holter and see if this appointment is February.  She is not currently established with a cardiologist.      Objective   Vital Signs:   /66 (BP Location: Left arm, Patient Position: Sitting, Cuff Size: Adult)   Pulse 62   Temp 96.9 °F (36.1 °C) (Temporal)   Ht 165.1 cm (65\")   Wt 92.5 kg (204 lb)   SpO2 99%   BMI 33.95 kg/m²     Physical Exam  Vitals and nursing note reviewed.   Constitutional:       Appearance: She is well-developed.   HENT:      Head: Normocephalic and atraumatic.      Right Ear: Hearing, ear canal and external ear normal. No drainage, swelling or tenderness. No middle ear effusion. Tympanic membrane is injected and bulging.      Left Ear: Hearing, ear canal and external ear normal. No drainage, swelling or tenderness.  No middle ear effusion. Tympanic membrane is injected and bulging.      Nose: Nose normal.      Mouth/Throat:      Lips: Pink.      Mouth: Mucous membranes are moist.      Pharynx: Oropharynx is clear. Uvula midline.   Eyes:      General: Lids are normal. Lids are everted, no foreign bodies appreciated. Vision grossly intact.      Extraocular Movements: Extraocular movements intact.      Conjunctiva/sclera: Conjunctivae normal.      Pupils: Pupils are equal, round, and reactive to light.   Neck:      Thyroid: No thyromegaly.   Cardiovascular:      Rate and Rhythm: Normal rate and regular rhythm.  No extrasystoles are present.     Pulses: Normal pulses.      Heart sounds: Normal heart sounds. No murmur heard.  No friction rub. No gallop.    Pulmonary:      Effort: Pulmonary effort is normal.      Breath sounds: Examination of the left-lower field reveals decreased breath sounds and wheezing. Decreased breath sounds and wheezing present.       Abdominal:      General: Bowel sounds are normal.      Palpations: Abdomen is soft.   Musculoskeletal:      Cervical back: Normal range of motion and neck " supple.      Thoracic back: Spasms and tenderness present. Normal range of motion. No scoliosis.        Back:       Right lower leg: No edema.      Left lower leg: No edema.   Lymphadenopathy:      Head:      Right side of head: No submental, submandibular, tonsillar, preauricular, posterior auricular or occipital adenopathy.      Left side of head: No submental, submandibular, tonsillar, preauricular, posterior auricular or occipital adenopathy.      Cervical: No cervical adenopathy.   Skin:     General: Skin is warm and dry.   Neurological:      General: No focal deficit present.      Mental Status: She is alert and oriented to person, place, and time.      Cranial Nerves: Cranial nerves are intact.      Sensory: Sensation is intact.      Motor: Motor function is intact.      Coordination: Coordination is intact.      Gait: Gait abnormal.      Deep Tendon Reflexes: Reflexes are normal and symmetric.      Comments: Does still report some dizziness with ambulation but significantly improved per patient   Psychiatric:         Attention and Perception: Attention normal.         Mood and Affect: Mood is anxious.         Speech: Speech normal.         Behavior: Behavior normal.         Thought Content: Thought content normal.         Cognition and Memory: Memory normal.         Judgment: Judgment normal.        Result Review :   The following data was reviewed by: DESHAWN Gr on 01/14/2022:  Common labs    Common Labsle 5/6/21 5/6/21 11/12/21 11/12/21 11/12/21 11/12/21 11/12/21 12/23/21 12/23/21    1024 1059 0712 0712 0712 0712 0712 1353 1353   Glucose 104 (A)   99     103 (A)   BUN 12   18     18   Creatinine 0.93   0.92     0.98   eGFR Non African Am 59 (A)   60 (A)     55 (A)   eGFR  Am 71   72        Sodium 143   142     140   Potassium 3.8   4.4     3.9   Chloride 106   105     104   Calcium 10.0   9.3     9.3   Total Protein    6.1        Albumin    4.40     4.20   Total Bilirubin    0.3     0.5    Alkaline Phosphatase    100     96   AST (SGOT)    21     31   ALT (SGPT)    13     20   WBC   4.05     3.49    Hemoglobin   13.5     13.2    Hematocrit   39.2     38.7    Platelets   178     177    Total Cholesterol     170       Triglycerides     77       HDL Cholesterol     60       LDL Cholesterol      96       Hemoglobin A1C  5.3     4.90     Uric Acid      5.6      (A) Abnormal value       Comments are available for some flowsheets but are not being displayed.           Data reviewed: Radiologic studies CT chest showing pulmonary infiltrates in bilateral lower bases; negative COVID test. and Cardiology studies  EKG discussed and compared EKG in office today      ECG 12 Lead    Date/Time: 1/14/2022 3:51 PM  Performed by: Cassie Peres APRN  Authorized by: Cassie Peres APRN   Comparison: compared with previous ECG from 12/29/2021  Comparison to previous ECG: Normal sinus rhythm  Left axis deviation  Incomplete right bundle branch block  Minimal voltage criteria for LVH, may be normal variant  Rhythm: sinus rhythm  Conduction: left anterior fascicular block    Clinical impression: abnormal EKG              Assessment and Plan    Diagnoses and all orders for this visit:    1. Pneumonia of left lower lobe due to infectious organism (Primary)  -     doxycycline (VIBRAMYCIN) 100 MG capsule; Take 1 capsule by mouth 2 (Two) Times a Day.  Dispense: 20 capsule; Refill: 0    2. Intermittent left-sided chest pain    3. LAFB (left anterior fascicular block)    4. Lab test negative for COVID-19 virus      I spent 30 minutes caring for Thania on this date of service. This time includes time spent by me in the following activities:preparing for the visit, reviewing tests, obtaining and/or reviewing a separately obtained history, performing a medically appropriate examination and/or evaluation , counseling and educating the patient/family/caregiver, ordering medications, tests, or procedures,  documenting information in the medical record, independently interpreting results and communicating that information with the patient/family/caregiver and care coordination  Follow Up   Return if symptoms worsen or fail to improve.     EKG completed in the office today unchanged from previous findings of left anterior fascicular block but heart rate in normal sinus and patient not experiencing chest pain today.  Advised patient if she experience left-sided chest pain that is persistent because of her recent syncopal episode and would like for her to go to the ER does not have to go back to [facility] however chest pain is a serious indicator of something being wrong and even though she does not show any changes on her EKG today could mean that next time she does.  Patient verbalizes understanding this and will comply with treatment recommendations.      Could be that her chest pain is coming from left lower lobe pneumonia Doscher sound like it today.  However could also be scar tissue we will give her antibiotics to cover for possible community-acquired pneumonia.  She did not receive antibiotics in the ER with me in our last visit due to some improvement with steroids.    Will possibly need to complete a sooner appointment when she has her echocardiogram completed.  Advised her to call scheduling department and see if they have any openings for seeing her echo at least a few times a week    Patient was given instructions and counseling regarding her condition or for health maintenance advice. Please see specific information pulled into the AVS if appropriate.

## 2022-01-14 NOTE — PATIENT INSTRUCTIONS
Community-Acquired Pneumonia, Adult  Pneumonia is an infection of the lungs. It causes irritation and swelling in the airways of the lungs. Mucus and fluid may also build up inside the airways. This may cause coughing and trouble breathing.  One type of pneumonia can happen while you are in a hospital. A different type can happen when you are not in a hospital (community-acquired pneumonia).  What are the causes?    This condition is caused by germs (viruses, bacteria, or fungi). Some types of germs can spread from person to person. Pneumonia is not thought to spread from person to person.  What increases the risk?  You are more likely to develop this condition if:  · You have a long-term (chronic) disease, such as:  ? Disease of the lungs. This may be chronic obstructive pulmonary disease (COPD) or asthma.  ? Heart failure.  ? Cystic fibrosis.  ? Diabetes.  ? Kidney disease.  ? Sickle cell disease.  ? HIV.  · You have other health problems, such as:  ? Your body's defense system (immune system) is weak.  ? A condition that may cause you to breathe in fluids from your mouth and nose.  · You had your spleen taken out.  · You do not take good care of your teeth and mouth (poor dental hygiene).  · You use or have used tobacco products.  · You travel where the germs that cause this illness are common.  · You are near certain animals or the places they live.  · You are older than 65 years of age.  What are the signs or symptoms?  Symptoms of this condition include:  · A cough.  · A fever.  · Sweating or chills.  · Chest pain, often when you breathe deeply or cough.  · Breathing problems, such as:  ? Fast breathing.  ? Trouble breathing.  ? Shortness of breath.  · Feeling tired (fatigued).  · Muscle aches.  How is this treated?  Treatment for this condition depends on many things, such as:  · The cause of your illness.  · Your medicines.  · Your other health problems.  Most adults can be treated at home. Sometimes,  treatment must happen in a hospital.  · Treatment may include medicines to kill germs.  · Medicines may depend on which germ caused your illness.  Very bad pneumonia is rare. If you get it, you may:  · Have a machine to help you breathe.  · Have fluid taken away from around your lungs.  Follow these instructions at home:  Medicines  · Take over-the-counter and prescription medicines only as told by your doctor.  · Take cough medicine only if you are losing sleep. Cough medicine can keep your body from taking mucus away from your lungs.  · If you were prescribed an antibiotic medicine, take it as told by your doctor. Do not stop taking the antibiotic even if you start to feel better.  Lifestyle         · Do not drink alcohol.  · Do not use any products that contain nicotine or tobacco, such as cigarettes, e-cigarettes, and chewing tobacco. If you need help quitting, ask your doctor.  · Eat a healthy diet. This includes a lot of vegetables, fruits, whole grains, low-fat dairy products, and low-fat (lean) protein.  General instructions    · Rest a lot. Sleep for at least 8 hours each night.  · Sleep with your head and neck raised. Put a few pillows under your head or sleep in a reclining chair.  · Return to your normal activities as told by your doctor. Ask your doctor what activities are safe for you.  · Drink enough fluid to keep your pee (urine) pale yellow.  · If your throat is sore, rinse your mouth often with salt water. To make salt water, dissolve ½-1 tsp (3-6 g) of salt in 1 cup (237 mL) of warm water.  · Keep all follow-up visits as told by your doctor. This is important.    How is this prevented?  You can lower your risk of pneumonia by:  · Getting the pneumonia shot (vaccine). These shots have different types and schedules. Ask your doctor what works best for you. Think about getting this shot if:  ? You are older than 65 years of age.  ? You are 19-65 years of age and:  § You are being treated for  cancer.  § You have long-term lung disease.  § You have other problems that affect your body's defense system. Ask your doctor if you have one of these.  · Getting your flu shot every year. Ask your doctor which type of shot is best for you.  · Going to the dentist as often as told.  · Washing your hands often with soap and water for at least 20 seconds. If you cannot use soap and water, use hand .  Contact a doctor if:  · You have a fever.  · You lose sleep because your cough medicine does not help.  Get help right away if:  · You are short of breath and this gets worse.  · You have more chest pain.  · Your sickness gets worse. This is very serious if:  ? You are an older adult.  ? Your body's defense system is weak.  · You cough up blood.  These symptoms may be an emergency. Do not wait to see if the symptoms will go away. Get medical help right away. Call your local emergency services (911 in the [place] Do not drive yourself to the hospital.  Summary  · Pneumonia is an infection of the lungs.  · Community-acquired pneumonia affects people who have not been in the hospital. Certain germs can cause this infection.  · This condition may be treated with medicines that kill germs.  · For very bad pneumonia, you may need a hospital stay and treatment to help with breathing.  This information is not intended to replace advice given to you by your health care provider. Make sure you discuss any questions you have with your health care provider.  Document Revised: 09/29/2020 Document Reviewed: 09/29/2020  [employer] Patient Education © 2021 [employer]

## 2022-01-24 RX ORDER — LEVOTHYROXINE SODIUM 0.1 MG/1
TABLET ORAL
Qty: 90 TABLET | Refills: 0 | Status: SHIPPED | OUTPATIENT
Start: 2022-01-24 | End: 2022-06-06 | Stop reason: SDUPTHER

## 2022-02-15 ENCOUNTER — HOSPITAL ENCOUNTER (OUTPATIENT)
Dept: CARDIOLOGY | Facility: HOSPITAL | Age: 77
Discharge: HOME | End: 2022-02-15
Admitting: NURSE PRACTITIONER

## 2022-02-15 VITALS
DIASTOLIC BLOOD PRESSURE: 71 MMHG | BODY MASS INDEX: 33.99 KG/M2 | WEIGHT: 204 LBS | SYSTOLIC BLOOD PRESSURE: 145 MMHG | HEIGHT: 65 IN

## 2022-02-15 DIAGNOSIS — R55 SYNCOPE, UNSPECIFIED SYNCOPE TYPE: ICD-10-CM

## 2022-02-15 LAB
BH CV ECHO MEAS - AO MAX PG (FULL): 0 MMHG
BH CV ECHO MEAS - AO MAX PG: 11.3 MMHG
BH CV ECHO MEAS - AO MEAN PG (FULL): 1 MMHG
BH CV ECHO MEAS - AO MEAN PG: 6 MMHG
BH CV ECHO MEAS - AO ROOT AREA (BSA CORRECTED): 1.6
BH CV ECHO MEAS - AO ROOT AREA: 8 CM^2
BH CV ECHO MEAS - AO ROOT DIAM: 3.2 CM
BH CV ECHO MEAS - AO V2 MAX: 168 CM/SEC
BH CV ECHO MEAS - AO V2 MEAN: 108 CM/SEC
BH CV ECHO MEAS - AO V2 VTI: 36.6 CM
BH CV ECHO MEAS - AVA(I,A): 3.2 CM^2
BH CV ECHO MEAS - AVA(I,D): 3.2 CM^2
BH CV ECHO MEAS - AVA(V,A): 3.1 CM^2
BH CV ECHO MEAS - AVA(V,D): 3.1 CM^2
BH CV ECHO MEAS - BSA(HAYCOCK): 2.1 M^2
BH CV ECHO MEAS - BSA: 2 M^2
BH CV ECHO MEAS - BZI_BMI: 33.9 KILOGRAMS/M^2
BH CV ECHO MEAS - BZI_METRIC_HEIGHT: 165.1 CM
BH CV ECHO MEAS - BZI_METRIC_WEIGHT: 92.5 KG
BH CV ECHO MEAS - EDV(CUBED): 54 ML
BH CV ECHO MEAS - EDV(MOD-SP4): 71.6 ML
BH CV ECHO MEAS - EDV(TEICH): 61.2 ML
BH CV ECHO MEAS - EF(CUBED): 83.8 %
BH CV ECHO MEAS - EF(MOD-SP4): 62.2 %
BH CV ECHO MEAS - EF(TEICH): 77.6 %
BH CV ECHO MEAS - ESV(CUBED): 8.7 ML
BH CV ECHO MEAS - ESV(MOD-SP4): 27.1 ML
BH CV ECHO MEAS - ESV(TEICH): 13.7 ML
BH CV ECHO MEAS - FS: 45.5 %
BH CV ECHO MEAS - IVS/LVPW: 1.1
BH CV ECHO MEAS - IVSD: 0.88 CM
BH CV ECHO MEAS - LA DIMENSION: 3.3 CM
BH CV ECHO MEAS - LA/AO: 1
BH CV ECHO MEAS - LAT PEAK E' VEL: 9.1 CM/SEC
BH CV ECHO MEAS - LV DIASTOLIC VOL/BSA (35-75): 35.9 ML/M^2
BH CV ECHO MEAS - LV MASS(C)D: 88.8 GRAMS
BH CV ECHO MEAS - LV MASS(C)DI: 44.5 GRAMS/M^2
BH CV ECHO MEAS - LV MAX PG: 11.3 MMHG
BH CV ECHO MEAS - LV MEAN PG: 5 MMHG
BH CV ECHO MEAS - LV SYSTOLIC VOL/BSA (12-30): 13.6 ML/M^2
BH CV ECHO MEAS - LV V1 MAX: 168 CM/SEC
BH CV ECHO MEAS - LV V1 MEAN: 105 CM/SEC
BH CV ECHO MEAS - LV V1 VTI: 36.9 CM
BH CV ECHO MEAS - LVIDD: 3.8 CM
BH CV ECHO MEAS - LVIDS: 2.1 CM
BH CV ECHO MEAS - LVLD AP4: 8 CM
BH CV ECHO MEAS - LVLS AP4: 6 CM
BH CV ECHO MEAS - LVOT AREA (M): 3.1 CM^2
BH CV ECHO MEAS - LVOT AREA: 3.1 CM^2
BH CV ECHO MEAS - LVOT DIAM: 2 CM
BH CV ECHO MEAS - LVPWD: 0.77 CM
BH CV ECHO MEAS - MED PEAK E' VEL: 7.29 CM/SEC
BH CV ECHO MEAS - MV A MAX VEL: 101 CM/SEC
BH CV ECHO MEAS - MV DEC TIME: 0.31 SEC
BH CV ECHO MEAS - MV E MAX VEL: 74.7 CM/SEC
BH CV ECHO MEAS - MV E/A: 0.74
BH CV ECHO MEAS - RAP SYSTOLE: 5 MMHG
BH CV ECHO MEAS - RVSP: 35.5 MMHG
BH CV ECHO MEAS - SI(AO): 147.6 ML/M^2
BH CV ECHO MEAS - SI(CUBED): 22.7 ML/M^2
BH CV ECHO MEAS - SI(LVOT): 58.1 ML/M^2
BH CV ECHO MEAS - SI(MOD-SP4): 22.3 ML/M^2
BH CV ECHO MEAS - SI(TEICH): 23.8 ML/M^2
BH CV ECHO MEAS - SV(AO): 294.4 ML
BH CV ECHO MEAS - SV(CUBED): 45.3 ML
BH CV ECHO MEAS - SV(LVOT): 115.9 ML
BH CV ECHO MEAS - SV(MOD-SP4): 44.5 ML
BH CV ECHO MEAS - SV(TEICH): 47.5 ML
BH CV ECHO MEAS - TR MAX VEL: 276 CM/SEC
BH CV ECHO MEASUREMENTS AVERAGE E/E' RATIO: 9.12
LEFT ATRIUM VOLUME INDEX: 25.8 ML/M2
LEFT ATRIUM VOLUME: 51.4 CM3
MAXIMAL PREDICTED HEART RATE: 145 BPM
STRESS TARGET HR: 123 BPM

## 2022-02-15 PROCEDURE — 93306 TTE W/DOPPLER COMPLETE: CPT | Performed by: INTERNAL MEDICINE

## 2022-02-15 PROCEDURE — 93306 TTE W/DOPPLER COMPLETE: CPT

## 2022-03-10 RX ORDER — CELECOXIB 200 MG/1
200 CAPSULE ORAL DAILY
Qty: 90 CAPSULE | Refills: 1 | Status: SHIPPED | OUTPATIENT
Start: 2022-03-10 | End: 2022-09-06

## 2022-03-21 DIAGNOSIS — J45.30 MILD PERSISTENT ASTHMA WITHOUT COMPLICATION: ICD-10-CM

## 2022-03-21 RX ORDER — ERGOCALCIFEROL 1.25 MG/1
50000 CAPSULE ORAL
Qty: 12 CAPSULE | Refills: 3 | Status: SHIPPED | OUTPATIENT
Start: 2022-03-21 | End: 2023-01-20 | Stop reason: DRUGHIGH

## 2022-03-21 NOTE — TELEPHONE ENCOUNTER
Rx Refill Note  Requested Prescriptions     Pending Prescriptions Disp Refills   • ProAir  (90 Base) MCG/ACT inhaler [Pharmacy Med Name: PROAIR HFA 108MCG/ACT AEROSOL SOLUTION] 8.5 g 1     Sig: INHALE 2 PUFFS BY MOUTH EVERY 4 HOURS AS NEEDED FOR WHEEZING.      Last office visit with prescribing clinician: 4/12/2021      Next office visit with prescribing clinician: 4/18/2022            Qi Moreira MA  03/21/22, 09:21 CDT

## 2022-03-28 RX ORDER — PANTOPRAZOLE SODIUM 40 MG/1
TABLET, DELAYED RELEASE ORAL
Qty: 30 TABLET | Refills: 1 | Status: SHIPPED | OUTPATIENT
Start: 2022-03-28 | End: 2023-01-19

## 2022-04-12 DIAGNOSIS — J45.30 MILD PERSISTENT ASTHMA WITHOUT COMPLICATION: ICD-10-CM

## 2022-04-12 RX ORDER — THEOPHYLLINE 300 MG/1
300 TABLET, EXTENDED RELEASE ORAL 2 TIMES DAILY
Qty: 180 TABLET | Refills: 3 | Status: SHIPPED | OUTPATIENT
Start: 2022-04-12

## 2022-04-12 NOTE — TELEPHONE ENCOUNTER
[facility] sent a refill request for theophylline ER Tablets 300 mg  Rx Refill Note  Requested Prescriptions     Pending Prescriptions Disp Refills   • theophylline (THEODUR) 300 MG 12 hr tablet 180 tablet 3     Sig: Take 1 tablet by mouth 2 (Two) Times a Day.      Last office visit with prescribing clinician: 4/12/2021      Next office visit with prescribing clinician: 4/18/2022            Qi Moreira MA  04/12/22, 14:52 CDT

## 2022-04-18 ENCOUNTER — OFFICE VISIT (OUTPATIENT)
Dept: PULMONOLOGY | Facility: CLINIC | Age: 77
End: 2022-04-18

## 2022-04-18 VITALS
WEIGHT: 212 LBS | DIASTOLIC BLOOD PRESSURE: 80 MMHG | HEART RATE: 85 BPM | BODY MASS INDEX: 35.32 KG/M2 | SYSTOLIC BLOOD PRESSURE: 138 MMHG | HEIGHT: 65 IN | OXYGEN SATURATION: 95 %

## 2022-04-18 DIAGNOSIS — R91.8 PULMONARY INFILTRATE: ICD-10-CM

## 2022-04-18 DIAGNOSIS — Z87.891 PERSONAL HISTORY OF NICOTINE DEPENDENCE: ICD-10-CM

## 2022-04-18 DIAGNOSIS — E66.01 SEVERE OBESITY (BMI 35.0-35.9 WITH COMORBIDITY): ICD-10-CM

## 2022-04-18 DIAGNOSIS — J45.30 MILD PERSISTENT ASTHMA WITHOUT COMPLICATION: Primary | ICD-10-CM

## 2022-04-18 PROCEDURE — 99214 OFFICE O/P EST MOD 30 MIN: CPT | Performed by: INTERNAL MEDICINE

## 2022-04-18 NOTE — PATIENT INSTRUCTIONS
The patient overall is doing well although she does have issues with sinus infections periodically and it tends to be worse when there is a lot of pollen in the air.  She did have a CT of the chest performed in late December which showed some patchy areas of infiltrate which likely relate to either scarring or some bronchitis or a combination of both.  I told her we will repeat the scan when she returns in about 2 months.  We will get pulmonary functions at that time as well.  She is doing well with her Advair Diskus and will continue this.

## 2022-04-20 PROBLEM — R91.8 PULMONARY INFILTRATE: Status: ACTIVE | Noted: 2022-04-20

## 2022-04-21 NOTE — PROGRESS NOTES
Chief Complaint  Mild persistent asthma without complication    Subjective    History of Present Illness     Thania Casiano presents to [facility] for asthma.    History of Present Illness   Patient doing well from the standpoint of her asthma.  Her major issue is with some sinus congestion which seems to be worse when there is a lot of pollen in the air.  She had a chest CT performed in late December which showed some patchy areas of infiltrate which may have related to an acute bronchitis possible due to a viral infection but also could relate to some scarring.  I am going to get a follow-up scan in 2 months we will get pulmonary functions on a return visit as well.  Her asthma has been under excellent control with the use of the Advair Diskus as maintenance therapy and she will continue this.  She has had her COVID-19 vaccine in the form of the Moderna vaccine including a booster and would be a potential candidate for a second booster sometime later this spring possibly by mid May it will be then 4 months since her initial booster dose.  She has had her flu shot.  She has not had her Pneumovax and I would recommend she get that through her primary healthcare provider.  Prior to Admission medications    Medication Sig Start Date End Date Taking? Authorizing Provider   acetaminophen (TYLENOL) 500 MG tablet Take 500 mg by mouth 2 (Two) Times a Day.   Yes Provider, MD Malcolm   Advair Diskus 100-50 MCG/DOSE DISKUS USE 1 INHALATION TWICE A DAY (RINSE AND SPIT AFTER USING) 8/18/21  Yes Natan Hughes MD   buPROPion XL (WELLBUTRIN XL) 300 MG 24 hr tablet TAKE 1 TABLET BY MOUTH ONCE DAILY 12/6/21  Yes Davon Ashely MD   busPIRone (BUSPAR) 5 MG tablet TAKE 1 TABLET BY MOUTH THREE TIMES DAILY.  Patient taking differently: 5 mg. 1 tablet in evening 4/26/21  Yes Cassie Peres APRN   celecoxib (CeleBREX) 200 MG capsule Take 1 capsule  "by mouth Daily. 3/10/22  Yes Nasra Jarrett DO   cloNIDine (CATAPRES) 0.1 MG tablet Take 1 tablet by mouth 2 (Two) Times a Day.  Patient taking differently: Take 0.1 mg by mouth Every Night. 21  Yes Davon Ashley MD   levothyroxine (SYNTHROID, LEVOTHROID) 100 MCG tablet TAKE 1 TABLET DAILY ON AN EMPTY STOMACH 22  Yes Davon Ashley MD   loratadine (Claritin) 10 MG tablet Take 1 tablet by mouth Daily. 21  Yes Cassie Peres APRN   LUMIGAN 0.01 % ophthalmic drops  17  Yes Malcolm Corwell MD   pantoprazole (PROTONIX) 40 MG EC tablet TAKE 1 TABLET BY MOUTH ONCE DAILY 3/28/22  Yes Bryan Warner MD   ProAir  (90 Base) MCG/ACT inhaler INHALE 2 PUFFS BY MOUTH EVERY 4 HOURS AS NEEDED FOR WHEEZING. 3/21/22  Yes Natan Hughes MD   spironolactone (ALDACTONE) 25 MG tablet TAKE 1 TABLET DAILY 21  Yes Cassie Peres APRN   theophylline (THEODUR) 300 MG 12 hr tablet Take 1 tablet by mouth 2 (Two) Times a Day. 22  Yes Natan Hughes MD   traZODone (DESYREL) 50 MG tablet TAKE 1 TABLET AT BEDTIME 21  Yes Zoie Sullivan APRN   vitamin D (ERGOCALCIFEROL) 1.25 MG (25769 UT) capsule capsule Take 1 capsule by mouth Every 7 (Seven) Days. 3/21/22  Yes Cassie Peres APRN       Social History     Socioeconomic History   • Marital status:    Tobacco Use   • Smoking status: Former Smoker     Packs/day: 1.00     Years: 25.00     Pack years: 25.00     Quit date:      Years since quittin.3   • Smokeless tobacco: Never Used   • Tobacco comment: off and on    Vaping Use   • Vaping Use: Never used   Substance and Sexual Activity   • Alcohol use: Not Currently   • Drug use: No   • Sexual activity: Not Currently     Partners: Male     Birth control/protection: Surgical       Objective   Vital Signs:   /80   Pulse 85   Ht 165.1 cm (65\")   Wt 96.2 kg (212 lb)   SpO2 95% Comment: RA  BMI 35.28 kg/m²   "   Physical Exam  Vitals and nursing note reviewed.   Constitutional:       Appearance: She is obese.   HENT:      Head: Normocephalic.      Comments: She is wearing a mask.  Eyes:      Extraocular Movements: Extraocular movements intact.      Pupils: Pupils are equal, round, and reactive to light.   Cardiovascular:      Rate and Rhythm: Normal rate and regular rhythm.   Pulmonary:      Effort: Pulmonary effort is normal.      Breath sounds: Normal breath sounds.   Musculoskeletal:         General: Normal range of motion.   Skin:     General: Skin is warm and dry.   Neurological:      General: No focal deficit present.      Mental Status: She is alert and oriented to person, place, and time.   Psychiatric:         Mood and Affect: Mood normal.         Behavior: Behavior normal.        Result Review :          Results for orders placed in visit on 12/10/18    Pulmonary Function Test                 My interpretation of imaging:    CT Angiogram Chest (12/23/2021 16:01)          Assessment and Plan     Diagnoses and all orders for this visit:    1. Mild persistent asthma without complication (Primary)  Assessment & Plan:  She will continue her Advair Diskus and may also continue her theophylline as she is not have any significant side effects from that medication along with her ProAir HFA inhaler as needed.    Orders:  -     Full Pulmonary Function Test Without Bronchodilator; Future    2. Pulmonary infiltrate  Assessment & Plan:  Had some patchy areas of infiltrate on a recent chest CT I am going to repeat the scan on her return visit in 2 months.  Did review her recent CT scan with her.    Orders:  -     CT Chest Without Contrast Diagnostic; Future    3. Personal history of nicotine dependence  Assessment & Plan:  She has a 25-pack-year history of smoking but has not smoked since 1980.      4. Severe obesity (BMI 35.0-35.9 with comorbidity) ([facility])  Assessment & Plan:  Diet and exercise are encouraged and she will  follow-up with her primary healthcare provider regarding her elevated BMI otherwise.        Patient's Body mass index is 35.28 kg/m². indicating that she is morbidly obese (BMI > 40 or > 35 with obesity - related health condition). Obesity-related health conditions include the following: GERD. Obesity is worsening.  Diet and exercise encouraged and she will follow-up with her primary healthcare provider regarding her elevated BMI otherwise.      Natan Hughes MD  4/20/2022  23:22 CDT    Follow Up   Return in about 2 months (around 6/18/2022) for To see me specifically, Complete PFT.    Patient was given instructions and counseling regarding her condition or for health maintenance advice. Please see specific information pulled into the AVS if appropriate.

## 2022-04-21 NOTE — ASSESSMENT & PLAN NOTE
Had some patchy areas of infiltrate on a recent chest CT I am going to repeat the scan on her return visit in 2 months.  Did review her recent CT scan with her.

## 2022-04-21 NOTE — ASSESSMENT & PLAN NOTE
She will continue her Advair Diskus and may also continue her theophylline as she is not have any significant side effects from that medication along with her ProAir HFA inhaler as needed.

## 2022-04-21 NOTE — ASSESSMENT & PLAN NOTE
Diet and exercise are encouraged and she will follow-up with her primary healthcare provider regarding her elevated BMI otherwise.

## 2022-05-13 ENCOUNTER — TELEPHONE (OUTPATIENT)
Dept: INTERNAL MEDICINE | Facility: CLINIC | Age: 77
End: 2022-05-13

## 2022-05-13 NOTE — TELEPHONE ENCOUNTER
Symptoms;  Runny nose   Cough  Sore throat  HA  Temp; 100.5  Fatigue   Pt has glaucoma and is unable to take a lot of medications because it raises the pressure in her eyes. Such as musinex

## 2022-05-13 NOTE — TELEPHONE ENCOUNTER
Caller: Thania Casiano    Relationship to patient: Self    Best call back number: [phone]    Additional information or concerns: PATIENT RECENTLY TESTED POSITIVE FOR COVID AND WOULD LIKE A CALLBACK FROM SOMEONE CLINICAL WITH ADVICE.    What is the patients preferred pharmacy: [facility], [facility] - [place] - [phone]  - [phone]   [phone]

## 2022-05-23 RX ORDER — PANTOPRAZOLE SODIUM 40 MG/1
TABLET, DELAYED RELEASE ORAL
Qty: 30 TABLET | Refills: 1 | OUTPATIENT
Start: 2022-05-23

## 2022-05-27 ENCOUNTER — OFFICE VISIT (OUTPATIENT)
Dept: INTERNAL MEDICINE | Facility: CLINIC | Age: 77
End: 2022-05-27

## 2022-05-27 VITALS
BODY MASS INDEX: 34.79 KG/M2 | WEIGHT: 208.8 LBS | SYSTOLIC BLOOD PRESSURE: 130 MMHG | RESPIRATION RATE: 16 BRPM | TEMPERATURE: 98.6 F | HEIGHT: 65 IN | HEART RATE: 88 BPM | OXYGEN SATURATION: 99 % | DIASTOLIC BLOOD PRESSURE: 66 MMHG

## 2022-05-27 DIAGNOSIS — I10 BENIGN HYPERTENSION: Primary | ICD-10-CM

## 2022-05-27 DIAGNOSIS — F43.23 SITUATIONAL MIXED ANXIETY AND DEPRESSIVE DISORDER: ICD-10-CM

## 2022-05-27 PROCEDURE — 99214 OFFICE O/P EST MOD 30 MIN: CPT | Performed by: NURSE PRACTITIONER

## 2022-05-27 RX ORDER — BUPROPION HYDROCHLORIDE 300 MG/1
300 TABLET ORAL DAILY
Qty: 90 TABLET | Refills: 1 | Status: SHIPPED | OUTPATIENT
Start: 2022-05-27 | End: 2022-11-02 | Stop reason: SDUPTHER

## 2022-05-27 RX ORDER — CLONIDINE HYDROCHLORIDE 0.1 MG/1
0.1 TABLET ORAL NIGHTLY
Qty: 90 TABLET | Refills: 1 | Status: SHIPPED | OUTPATIENT
Start: 2022-05-27 | End: 2022-11-02 | Stop reason: SDUPTHER

## 2022-05-27 RX ORDER — BUSPIRONE HYDROCHLORIDE 5 MG/1
5 TABLET ORAL 3 TIMES DAILY PRN
Qty: 90 TABLET | Refills: 5 | Status: SHIPPED | OUTPATIENT
Start: 2022-05-27 | End: 2022-08-30 | Stop reason: SDUPTHER

## 2022-05-27 NOTE — PROGRESS NOTES
"Chief Complaint  med check, Med Refill (Needs refill on clonidine, buspar, & wellbutrin), and Earache    Subjective          Thania Casiano presents to [facility]  Patient states that she is needing refills on her BP and anxiety medication.       Objective   Vital Signs:  /66   Pulse 88   Temp 98.6 °F (37 °C) (Temporal)   Resp 16   Ht 165.1 cm (65\")   Wt 94.7 kg (208 lb 12.8 oz)   SpO2 99%   BMI 34.75 kg/m²   \plain      Physical Exam  Vitals and nursing note reviewed.   Constitutional:       Appearance: Normal appearance. She is well-developed.   HENT:      Head: Normocephalic and atraumatic.      Right Ear: Tympanic membrane, ear canal and external ear normal.      Left Ear: Tympanic membrane, ear canal and external ear normal.      Nose: Nose normal. No septal deviation, nasal tenderness or congestion.      Mouth/Throat:      Lips: Pink. No lesions.      Mouth: Mucous membranes are moist. No oral lesions.      Dentition: Normal dentition.      Pharynx: Oropharynx is clear. No pharyngeal swelling, oropharyngeal exudate or posterior oropharyngeal erythema.   Eyes:      General: Lids are normal. Vision grossly intact. No scleral icterus.        Right eye: No discharge.         Left eye: No discharge.      Extraocular Movements: Extraocular movements intact.      Conjunctiva/sclera: Conjunctivae normal.      Right eye: Right conjunctiva is not injected.      Left eye: Left conjunctiva is not injected.      Pupils: Pupils are equal, round, and reactive to light.   Neck:      Thyroid: No thyroid mass.      Trachea: Trachea normal.   Cardiovascular:      Rate and Rhythm: Normal rate and regular rhythm.      Heart sounds: Normal heart sounds. No murmur heard.    No gallop.   Pulmonary:      Effort: Pulmonary effort is normal.      Breath sounds: Normal breath sounds and air entry. No wheezing, rhonchi or rales.   Musculoskeletal:         General: No tenderness or " deformity. Normal range of motion.      Cervical back: Full passive range of motion without pain, normal range of motion and neck supple.      Thoracic back: Normal.      Right lower leg: No edema.      Left lower leg: No edema.   Skin:     General: Skin is warm and dry.      Coloration: Skin is not jaundiced.      Findings: No rash.   Neurological:      Mental Status: She is alert and oriented to person, place, and time.      Cranial Nerves: Cranial nerves are intact.      Sensory: Sensation is intact.      Motor: Motor function is intact.      Coordination: Coordination is intact.      Gait: Gait is intact.      Deep Tendon Reflexes: Reflexes are normal and symmetric.   Psychiatric:         Mood and Affect: Mood and affect normal.         Behavior: Behavior normal.         Judgment: Judgment normal.        Result Review :                 Assessment and Plan    Diagnoses and all orders for this visit:    1. Benign hypertension (Primary)  -     cloNIDine (CATAPRES) 0.1 MG tablet; Take 1 tablet by mouth Every Night.  Dispense: 90 tablet; Refill: 1    2. Situational mixed anxiety and depressive disorder  -     busPIRone (BUSPAR) 5 MG tablet; Take 1 tablet by mouth 3 (Three) Times a Day As Needed (anxiety). 1 tablet in evening  Dispense: 90 tablet; Refill: 5  -     buPROPion XL (WELLBUTRIN XL) 300 MG 24 hr tablet; Take 1 tablet by mouth Daily.  Dispense: 90 tablet; Refill: 1    Patient states that she is doing well on her medications Labs were completed in December and were stable. Denies any concerns today. Just needing refills.   F/u 6months for labs and yearly physical.   Patient also states that her ears have been bothering her, especially since covid. The exam was unremarkable.        Follow Up   Return in about 6 months (around 11/27/2022) for Annual physical, Recheck.  Patient was given instructions and counseling regarding her condition or for health maintenance advice. Please see specific information pulled  into the AVS if appropriate.

## 2022-06-06 ENCOUNTER — TELEPHONE (OUTPATIENT)
Dept: PULMONOLOGY | Facility: CLINIC | Age: 77
End: 2022-06-06

## 2022-06-06 DIAGNOSIS — J45.21 MILD INTERMITTENT ASTHMA WITH ACUTE EXACERBATION: Primary | ICD-10-CM

## 2022-06-06 RX ORDER — PREDNISONE 10 MG/1
TABLET ORAL
Qty: 31 TABLET | Refills: 0 | Status: SHIPPED | OUTPATIENT
Start: 2022-06-06 | End: 2022-08-30

## 2022-06-06 RX ORDER — LEVOTHYROXINE SODIUM 0.1 MG/1
100 TABLET ORAL DAILY
Qty: 90 TABLET | Refills: 3 | Status: SHIPPED | OUTPATIENT
Start: 2022-06-06

## 2022-06-06 NOTE — TELEPHONE ENCOUNTER
She left a message again asking if anything had been called in for her.   I called her back at the number provided and it goes straight to voicemail. I called her again and left another message stating that I was needing more information before I was going to send a message to Dr Hughes

## 2022-06-06 NOTE — TELEPHONE ENCOUNTER
I couldn't get a hold of the patient on the number that she gave so I tried to call her husbands number.     She had COVID last month and it has her asthma stirred up. She said she couldn't breath last night. She is coughing a lot. She isn't coughing anything up cause she said it is thick mucus. She cant breath through her nose and using vicks salve under nose. She isn't running a fever. She isn't using mucinex. She said she hasn't been like this in a long time.  I told her that Dr Hughes isn't in the office right now but we will call her back as soon as possible but if it got worse to go to Urgent care or ER.

## 2022-06-06 NOTE — TELEPHONE ENCOUNTER
I did call the patient and told her I can call her in some antibiotics and steroids but if she worsens she should go to urgent care or the emergency room.  She prefers just to try steroids at this point as she is not having any fever or purulent sputum production.  Again I will send in tapering prednisone and she can certainly try Mucinex as well to see if this helps and loosening secretions.  Again if she worsens despite the above she should go to urgent care or the emergency room.

## 2022-06-06 NOTE — TELEPHONE ENCOUNTER
Patient called stating that she had COVID last month. She is having respiratory issues and feels like an asthma attack. She has an appointment and pulmonary function test scheduled for 06/24/2022. She said if she doesn't get to feeling better than she will not be able to do the pulmonary function test.     I tried to call her to get more information and left a message for her to call back.

## 2022-06-14 DIAGNOSIS — Z12.31 SCREENING MAMMOGRAM, ENCOUNTER FOR: Primary | ICD-10-CM

## 2022-06-14 DIAGNOSIS — Z12.31 SCREENING MAMMOGRAM, ENCOUNTER FOR: ICD-10-CM

## 2022-06-17 ENCOUNTER — TELEPHONE (OUTPATIENT)
Dept: PULMONOLOGY | Facility: CLINIC | Age: 77
End: 2022-06-17

## 2022-06-17 NOTE — TELEPHONE ENCOUNTER
Qi, please call the patient let her know that is fine to cancel the PFTs and in general we usually recommend waiting at least 3 months post COVID before getting pulmonary functions anyway.  She can still keep her office appointment and then I can reschedule pulmonary functions for sometime later in the summer.

## 2022-06-17 NOTE — TELEPHONE ENCOUNTER
Patient is wanting to cancel her pulmonary function test for next week due that she had COVID in May. Now she has post covid syndrome. Is that ok?

## 2022-06-24 ENCOUNTER — HOSPITAL ENCOUNTER (OUTPATIENT)
Dept: CT IMAGING | Facility: HOSPITAL | Age: 77
Discharge: HOME | End: 2022-06-24
Admitting: INTERNAL MEDICINE

## 2022-06-24 ENCOUNTER — OFFICE VISIT (OUTPATIENT)
Dept: PULMONOLOGY | Facility: CLINIC | Age: 77
End: 2022-06-24

## 2022-06-24 VITALS
DIASTOLIC BLOOD PRESSURE: 82 MMHG | HEIGHT: 65 IN | WEIGHT: 204 LBS | SYSTOLIC BLOOD PRESSURE: 124 MMHG | BODY MASS INDEX: 33.99 KG/M2 | OXYGEN SATURATION: 96 % | HEART RATE: 78 BPM

## 2022-06-24 DIAGNOSIS — U09.9 POST-COVID SYNDROME: ICD-10-CM

## 2022-06-24 DIAGNOSIS — R91.8 PULMONARY INFILTRATE: ICD-10-CM

## 2022-06-24 DIAGNOSIS — R91.1 LUNG NODULE: ICD-10-CM

## 2022-06-24 DIAGNOSIS — J45.30 MILD PERSISTENT ASTHMA WITHOUT COMPLICATION: Primary | ICD-10-CM

## 2022-06-24 DIAGNOSIS — Z87.891 PERSONAL HISTORY OF NICOTINE DEPENDENCE: ICD-10-CM

## 2022-06-24 DIAGNOSIS — E66.9 OBESITY (BMI 30-39.9): ICD-10-CM

## 2022-06-24 PROCEDURE — 71250 CT THORAX DX C-: CPT

## 2022-06-24 PROCEDURE — 99214 OFFICE O/P EST MOD 30 MIN: CPT | Performed by: INTERNAL MEDICINE

## 2022-06-24 RX ORDER — PREDNISONE 10 MG/1
TABLET ORAL
Qty: 31 TABLET | Refills: 1 | Status: SHIPPED | OUTPATIENT
Start: 2022-06-24 | End: 2022-08-30

## 2022-06-24 RX ORDER — FLUTICASONE PROPIONATE AND SALMETEROL 100; 50 UG/1; UG/1
1 POWDER RESPIRATORY (INHALATION)
Qty: 180 EACH | Refills: 3 | Status: SHIPPED | OUTPATIENT
Start: 2022-06-24 | End: 2023-01-17

## 2022-06-24 NOTE — PROGRESS NOTES
Chief Complaint  Asthma and Recent COVID-19 infection.    Subjective    History of Present Illness     Thania Casiano presents to [facility] for asthma and a recent COVID-19 infection.    History of Present Illness   The plan had been for the patient to have pulmonary functions performed today but she did have a recent COVID-19 infection.  She had a lot of upper respiratory symptoms and is also had issues with shortness of breath and fatigue.  She is feeling better but has clearly not totally recovered.  She did take some prednisone during her acute illness which helped her somewhat.  As her asthma may still flare related to this recent COVID infection I did provide her a printed prescription for refills of prednisone to take if need be.  I told her we will just plan on a follow-up in about 3 months with pulmonary functions.  She did have a follow-up chest CT done earlier this morning and it showed resolution of some previously noted groundglass changes with a stable 5 mm subpleural left lower lobe nodule.  I did review the scan and went over the results with the patient as well.  She has had the COVID-19 vaccine in the form of the Moderna vaccine and 1 booster and will be a candidate for second booster.  She is also had the flu shot this past flu season.  [facility] there is note that she did receive the Pneumovax previously although I do not have a definite date.  If she has not had a Prevnar as well she would be a candidate for this.  We do not have it available in the office at this time but she will get through her primary healthcare provider or one of the local pharmacies.  We may have it available in the future.  Prior to Admission medications    Medication Sig Start Date End Date Taking? Authorizing Provider   acetaminophen (TYLENOL) 500 MG tablet Take 500 mg by mouth 2 (Two) Times a Day.   Yes  Malcolm Crowell MD   buPROPion XL (WELLBUTRIN XL) 300 MG 24 hr tablet Take 1 tablet by mouth Daily. 5/27/22  Yes Marilyn Ferraro APRN   busPIRone (BUSPAR) 5 MG tablet Take 1 tablet by mouth 3 (Three) Times a Day As Needed (anxiety). 1 tablet in evening 5/27/22  Yes Marilyn Ferraro APRN   celecoxib (CeleBREX) 200 MG capsule Take 1 capsule by mouth Daily. 3/10/22  Yes Nasra Jarrett DO   cloNIDine (CATAPRES) 0.1 MG tablet Take 1 tablet by mouth Every Night. 5/27/22  Yes Marilyn Ferraro APRN   Fluticasone-Salmeterol (Advair Diskus) 100-50 MCG/ACT DISKUS Inhale 1 puff 2 (Two) Times a Day. 6/24/22  Yes Natan Hughes MD   levothyroxine (SYNTHROID, LEVOTHROID) 100 MCG tablet Take 1 tablet by mouth Daily. on an empty stomach 6/6/22  Yes Nasra Jarrett DO   loratadine (Claritin) 10 MG tablet Take 1 tablet by mouth Daily. 12/29/21  Yes Cassie Peres APRN   LUMIGAN 0.01 % ophthalmic drops  12/14/17  Yes Malcolm Crowell MD   pantoprazole (PROTONIX) 40 MG EC tablet TAKE 1 TABLET BY MOUTH ONCE DAILY 3/28/22  Yes Bryan Warner MD   predniSONE (DELTASONE) 10 MG tablet Take 4 tabs daily x 3 days, then take 3 tabs daily x 3 days, then take 2 tabs daily x 3 days, then take 1 tab daily x 3 days 6/6/22  Yes Natan Hughes MD   ProAir  (90 Base) MCG/ACT inhaler INHALE 2 PUFFS BY MOUTH EVERY 4 HOURS AS NEEDED FOR WHEEZING. 3/21/22  Yes Natan Hughes MD   spironolactone (ALDACTONE) 25 MG tablet TAKE 1 TABLET DAILY 6/1/21  Yes Cassie Peres APRN   theophylline (THEODUR) 300 MG 12 hr tablet Take 1 tablet by mouth 2 (Two) Times a Day. 4/12/22  Yes Natan Hughes MD   traZODone (DESYREL) 50 MG tablet TAKE 1 TABLET AT BEDTIME 9/1/21  Yes Zoie Sullivan APRN   vitamin D (ERGOCALCIFEROL) 1.25 MG (58221 UT) capsule capsule Take 1 capsule by mouth Every 7 (Seven) Days. 3/21/22  Yes Cassie Peres APRN   Advair  "Diskus 100-50 MCG/DOSE DISKUS USE 1 INHALATION TWICE A DAY (RINSE AND SPIT AFTER USING) 21 Yes Natan Hughes MD   predniSONE (DELTASONE) 10 MG tablet Take 4 tabs daily x 3 days, then take 3 tabs daily x 3 days, then take 2 tabs daily x 3 days, then take 1 tab daily x 3 days 22   Natan Hughes MD       Social History     Socioeconomic History   • Marital status:    Tobacco Use   • Smoking status: Former Smoker     Packs/day: 1.00     Years: 25.00     Pack years: 25.00     Quit date:      Years since quittin.5   • Smokeless tobacco: Never Used   • Tobacco comment: off and on    Vaping Use   • Vaping Use: Never used   Substance and Sexual Activity   • Alcohol use: Not Currently   • Drug use: No   • Sexual activity: Not Currently     Partners: Male     Birth control/protection: Surgical       Objective   Vital Signs:   /82   Pulse 78   Ht 165.1 cm (65\")   Wt 92.5 kg (204 lb)   SpO2 96% Comment: RA  BMI 33.95 kg/m²     Physical Exam  Vitals and nursing note reviewed.   Constitutional:       Appearance: She is obese.   HENT:      Head: Normocephalic.      Comments: She is wearing a mask.  Eyes:      Extraocular Movements: Extraocular movements intact.      Pupils: Pupils are equal, round, and reactive to light.   Cardiovascular:      Rate and Rhythm: Normal rate and regular rhythm.   Pulmonary:      Effort: Pulmonary effort is normal.      Breath sounds: Normal breath sounds.   Musculoskeletal:         General: Normal range of motion.   Skin:     General: Skin is warm and dry.   Neurological:      General: No focal deficit present.      Mental Status: She is alert and oriented to person, place, and time.   Psychiatric:         Mood and Affect: Mood normal.         Behavior: Behavior normal.        Result Review :          Results for orders placed in visit on 12/10/18    Pulmonary Function Test                 My interpretation of imaging:    CT Chest Without " Contrast Diagnostic (06/24/2022 08:55)          Assessment and Plan     Diagnoses and all orders for this visit:    1. Mild persistent asthma without complication (Primary)  Assessment & Plan:  PFTs were canceled for today and will be rescheduled to be performed on a return visit in 3 months.  She may continue her ProAir HFA as needed, her Advair Diskus and a refill was sent, her sustained-release theophylline, and also prednisone in tapering fashion if need be and I did provide her a printed prescription for this.  This is generally bad time of the year for her with the heat humidity and a recent COVID infection may make this more of an issue at least in the short-term.        Orders:  -     Fluticasone-Salmeterol (Advair Diskus) 100-50 MCG/ACT DISKUS; Inhale 1 puff 2 (Two) Times a Day.  Dispense: 180 each; Refill: 3  -     predniSONE (DELTASONE) 10 MG tablet; Take 4 tabs daily x 3 days, then take 3 tabs daily x 3 days, then take 2 tabs daily x 3 days, then take 1 tab daily x 3 days  Dispense: 31 tablet; Refill: 1  -     Full Pulmonary Function Test Without Bronchodilator; Future    2. Lung nodule  Assessment & Plan:  This was stable on her chest CT done earlier today.      3. Pulmonary infiltrate  Assessment & Plan:  These have resolved on her chest CT done earlier today.      4. Post-COVID syndrome  Assessment & Plan:  She has some residual symptoms with shortness of breath and fatigue.      5. Personal history of nicotine dependence  Assessment & Plan:  She quit smoking in 1980.      6. Obesity (BMI 30-39.9)  Assessment & Plan:  Patient's (Body mass index is 33.95 kg/m².) indicates that they are obese (BMI >30) with health conditions that include GERD . Weight is unchanged.  Diet and exercise are encouraged and she will follow-up with her primary healthcare provider regarding her elevated BMI otherwise.      Other orders  -     Cancel: COVID PRE-OP / PRE-PROCEDURE SCREENING ORDER (NO ISOLATION) - Swab, Nasal  Cavity; Future        Natan Hughes MD  6/24/2022  17:57 CDT    Follow Up   Return in about 3 months (around 9/24/2022) for Complete PFT, To see me specifically.    Patient was given instructions and counseling regarding her condition or for health maintenance advice. Please see specific information pulled into the AVS if appropriate.

## 2022-06-24 NOTE — ASSESSMENT & PLAN NOTE
Patient's (Body mass index is 33.95 kg/m².) indicates that they are obese (BMI >30) with health conditions that include GERD . Weight is unchanged.  Diet and exercise are encouraged and she will follow-up with her primary healthcare provider regarding her elevated BMI otherwise.

## 2022-06-24 NOTE — PATIENT INSTRUCTIONS
The patient have COVID recently.  She still has some residual symptoms of shortness of breath.  She took prednisone which did help her somewhat.  I provided her a prescription for some refills in printed form in case she does have worsening symptoms but she would like to hold off if possible because of potential steroid side effects I told her that was fine.  She is practicing some deep breathing exercises.  I will see her back in 3 months with pulmonary functions.

## 2022-06-24 NOTE — ASSESSMENT & PLAN NOTE
PFTs were canceled for today and will be rescheduled to be performed on a return visit in 3 months.  She may continue her ProAir HFA as needed, her Advair Diskus and a refill was sent, her sustained-release theophylline, and also prednisone in tapering fashion if need be and I did provide her a printed prescription for this.  This is generally bad time of the year for her with the heat humidity and a recent COVID infection may make this more of an issue at least in the short-term.

## 2022-06-24 NOTE — TELEPHONE ENCOUNTER
The patient was seen in the office today on June 24.  She is recovering from COVID.  Her chest CT did not show any acute process and actually was improved from previous.  She will be scheduled for a follow-up appointment in 3 months we will get her pulmonary functions at that time.  Please refer to my office note for further details.

## 2022-08-29 RX ORDER — TRAZODONE HYDROCHLORIDE 50 MG/1
50 TABLET ORAL
Qty: 90 TABLET | Refills: 3 | Status: SHIPPED | OUTPATIENT
Start: 2022-08-29

## 2022-08-30 ENCOUNTER — OFFICE VISIT (OUTPATIENT)
Dept: INTERNAL MEDICINE | Facility: CLINIC | Age: 77
End: 2022-08-30

## 2022-08-30 VITALS
TEMPERATURE: 97.1 F | SYSTOLIC BLOOD PRESSURE: 134 MMHG | BODY MASS INDEX: 32.99 KG/M2 | DIASTOLIC BLOOD PRESSURE: 76 MMHG | HEIGHT: 65 IN | OXYGEN SATURATION: 95 % | WEIGHT: 198 LBS | HEART RATE: 79 BPM

## 2022-08-30 DIAGNOSIS — E03.9 HYPOTHYROIDISM (ACQUIRED): ICD-10-CM

## 2022-08-30 DIAGNOSIS — L98.9 SKIN LESION: ICD-10-CM

## 2022-08-30 DIAGNOSIS — F43.23 SITUATIONAL MIXED ANXIETY AND DEPRESSIVE DISORDER: ICD-10-CM

## 2022-08-30 DIAGNOSIS — K21.9 GASTROESOPHAGEAL REFLUX DISEASE WITHOUT ESOPHAGITIS: ICD-10-CM

## 2022-08-30 DIAGNOSIS — I10 BENIGN HYPERTENSION: ICD-10-CM

## 2022-08-30 DIAGNOSIS — E78.00 PURE HYPERCHOLESTEROLEMIA: Primary | ICD-10-CM

## 2022-08-30 DIAGNOSIS — R73.01 IFG (IMPAIRED FASTING GLUCOSE): ICD-10-CM

## 2022-08-30 LAB — HBA1C MFR BLD: 5 %

## 2022-08-30 PROCEDURE — 3044F HG A1C LEVEL LT 7.0%: CPT | Performed by: FAMILY MEDICINE

## 2022-08-30 PROCEDURE — 83036 HEMOGLOBIN GLYCOSYLATED A1C: CPT | Performed by: FAMILY MEDICINE

## 2022-08-30 PROCEDURE — 99214 OFFICE O/P EST MOD 30 MIN: CPT | Performed by: FAMILY MEDICINE

## 2022-08-30 RX ORDER — BUSPIRONE HYDROCHLORIDE 5 MG/1
5 TABLET ORAL 3 TIMES DAILY PRN
Qty: 90 TABLET | Refills: 5 | Status: SHIPPED | OUTPATIENT
Start: 2022-08-30 | End: 2022-08-31 | Stop reason: SDUPTHER

## 2022-08-30 RX ORDER — SPIRONOLACTONE 25 MG/1
25 TABLET ORAL DAILY
Qty: 90 TABLET | Refills: 3 | Status: SHIPPED | OUTPATIENT
Start: 2022-08-30

## 2022-08-30 NOTE — PROGRESS NOTES
Subjective     Chief Complaint   Patient presents with   • Hypertension     6 month follow up    • Prediabetes     A1c:  5.0       History of Present Illness  The patient reports that she had COVID-19 in 05/2022. She states that she had post COVID-19 respiratory symptoms. She notes that she called Dr. Shipman and was prescribed prednisone. She admits that she has gained weight since taking the prednisone. She adds that she is retaining fluid. She notes that she drinks 6 to 7 regular bottles of water per day. She denies drinking tea or coffee.    The patient reports that she monitors her blood pressure at home. She states that it is always the same.    The patient reports that she has a torn meniscus in her knee. She states that she can not do water walking because she is terrified of it. She notes that she has almost drowned several times. She admits that she has tried a recumbent bicycle in the past; however, it caused her knee pain. She states that she has not used a knee brace in a long time.    The patient's most recent A1c is 5 percent. She notes that she has dry mouth. The patient's most recent cholesterol was good in 11/2021.    The patient states that she is a little unsteady on her feet.    The patient reports that she had a blistering sunburn when she was 15 years old.    Patient's PMR from outside medical facility reviewed and noted.    Review of Systems     Otherwise complete ROS reviewed and negative except as mentioned in the HPI.    Past Medical History:   Past Medical History:   Diagnosis Date   • Anxiety    • Arthritis    • Asthma    • COVID-19    • Depression    • GERD (gastroesophageal reflux disease)    • Glaucoma    • H. pylori infection    • Hypertension    • Increased intraocular pressure    • Migraine    • Peptic ulcer    • Polymyalgia (HCC)    • Polymyalgia rheumatica (HCC)    • PONV (postoperative nausea and vomiting)    • Thyroid disease      Past Surgical History:  Past Surgical  History:   Procedure Laterality Date   • APPENDECTOMY     • BACK SURGERY     • BREAST BIOPSY     • CARPAL TUNNEL RELEASE     • CATARACT EXTRACTION Bilateral    • CERVICAL SPINAL CORD TUMOR REMOVAL Right 1/26/2018    Procedure: CERVICAL SPINAL TUMOR REMOVAL RIGHT;  Surgeon: Yoav Palencia MD;  Location: [facility] OR;  Service:    • CHOLECYSTECTOMY     • COLONOSCOPY  08/27/2015   • COLONOSCOPY N/A 9/30/2019    Procedure: COLONOSCOPY WITH ANESTHESIA;  Surgeon: Bryan Warner MD;  Location: [facility] ENDOSCOPY;  Service: Gastroenterology   • ENDOSCOPY  08/24/2010   • ENDOSCOPY N/A 9/30/2019    Procedure: ESOPHAGOGASTRODUODENOSCOPY WITH ANESTHESIA;  Surgeon: Bryan Warner MD;  Location: [facility] ENDOSCOPY;  Service: Gastroenterology   • ENDOSCOPY N/A 4/9/2021    Procedure: ESOPHAGOGASTRODUODENOSCOPY WITH ANESTHESIA;  Surgeon: Bryan Warner MD;  Location: [facility] ENDOSCOPY;  Service: Gastroenterology;  Laterality: N/A;  preop; nausea  postop: small bowel diverticulum,  PCP Davon Ashley    • GALLBLADDER SURGERY     • HYSTERECTOMY     • TONSILLECTOMY       Social History:  reports that she quit smoking about 42 years ago. She has a 25.00 pack-year smoking history. She has never used smokeless tobacco. She reports current alcohol use. She reports that she does not use drugs.    Family History: family history includes Colon cancer in her paternal uncle, paternal uncle and another family member; Colon polyps in her father.      Allergies:  No Known Allergies  Medications:  Prior to Admission medications    Medication Sig Start Date End Date Taking? Authorizing Provider   acetaminophen (TYLENOL) 500 MG tablet Take 500 mg by mouth 2 (Two) Times a Day.   Yes Provider, MD Malcolm   buPROPion XL (WELLBUTRIN XL) 300 MG 24 hr tablet Take 1 tablet by mouth Daily. 5/27/22  Yes Marilyn Ferraro APRN   busPIRone (BUSPAR) 5 MG tablet Take 1 tablet by mouth 3 (Three) Times a Day As Needed (anxiety). 1 tablet in  evening  Patient taking differently: Take 5 mg by mouth 3 (Three) Times a Day As Needed (anxiety). 1 tablet in evening and one in the morning on occasion 5/27/22  Yes Marilyn Ferraro APRN   celecoxib (CeleBREX) 200 MG capsule Take 1 capsule by mouth Daily. 3/10/22  Yes Nasra Jarrett DO   cloNIDine (CATAPRES) 0.1 MG tablet Take 1 tablet by mouth Every Night. 5/27/22  Yes Marilyn Ferraro APRN   Fluticasone-Salmeterol (Advair Diskus) 100-50 MCG/ACT DISKUS Inhale 1 puff 2 (Two) Times a Day. 6/24/22  Yes Natan Hughes MD   levothyroxine (SYNTHROID, LEVOTHROID) 100 MCG tablet Take 1 tablet by mouth Daily. on an empty stomach 6/6/22  Yes Nasra Jarrett DO   loratadine (Claritin) 10 MG tablet Take 1 tablet by mouth Daily. 12/29/21  Yes Cassie Peres APRN   LUMIGAN 0.01 % ophthalmic drops  12/14/17  Yes Malcolm Crowell MD   pantoprazole (PROTONIX) 40 MG EC tablet TAKE 1 TABLET BY MOUTH ONCE DAILY 3/28/22  Yes Bryan Warner MD   ProAir  (90 Base) MCG/ACT inhaler INHALE 2 PUFFS BY MOUTH EVERY 4 HOURS AS NEEDED FOR WHEEZING. 3/21/22  Yes Natan Hughes MD   spironolactone (ALDACTONE) 25 MG tablet TAKE 1 TABLET DAILY 6/1/21  Yes Cassie Peres APRN   theophylline (THEODUR) 300 MG 12 hr tablet Take 1 tablet by mouth 2 (Two) Times a Day. 4/12/22  Yes Natan Hughes MD   traZODone (DESYREL) 50 MG tablet Take 1 tablet by mouth every night at bedtime. 8/29/22  Yes Dilcia Odonnell APRN   vitamin D (ERGOCALCIFEROL) 1.25 MG (86050 UT) capsule capsule Take 1 capsule by mouth Every 7 (Seven) Days. 3/21/22  Yes Cassie Peres APRN   predniSONE (DELTASONE) 10 MG tablet Take 4 tabs daily x 3 days, then take 3 tabs daily x 3 days, then take 2 tabs daily x 3 days, then take 1 tab daily x 3 days 6/6/22 8/30/22  Natan Hughes MD   predniSONE (DELTASONE) 10 MG tablet Take 4 tabs daily x 3 days, then take 3 tabs daily x 3  "days, then take 2 tabs daily x 3 days, then take 1 tab daily x 3 days 6/24/22 8/30/22  Natan Hughes MD       Objective     Vital Signs: /76 (BP Location: Left arm, Patient Position: Sitting, Cuff Size: Adult)   Pulse 79   Temp 97.1 °F (36.2 °C) (Temporal)   Ht 165.1 cm (65\")   Wt 89.8 kg (198 lb)   SpO2 95%   Breastfeeding No   BMI 32.95 kg/m²   Physical Exam  Vitals and nursing note reviewed.   Constitutional:       Appearance: Normal appearance. She is well-developed.   HENT:      Head: Normocephalic and atraumatic.      Right Ear: External ear normal.      Left Ear: External ear normal.      Nose: Nose normal.      Mouth/Throat:      Mouth: Mucous membranes are moist.   Eyes:      Extraocular Movements: Extraocular movements intact.      Conjunctiva/sclera: Conjunctivae normal.      Pupils: Pupils are equal, round, and reactive to light.   Neck:      Thyroid: No thyromegaly.      Vascular: No JVD.      Trachea: No tracheal deviation.   Cardiovascular:      Rate and Rhythm: Normal rate and regular rhythm.      Pulses: Normal pulses.      Heart sounds: Normal heart sounds. No murmur heard.    No friction rub. No gallop.   Pulmonary:      Effort: Pulmonary effort is normal.      Breath sounds: Normal breath sounds.   Abdominal:      General: Bowel sounds are normal. There is no distension.      Palpations: Abdomen is soft.      Tenderness: There is no abdominal tenderness.   Musculoskeletal:         General: Normal range of motion.      Cervical back: Normal range of motion and neck supple.   Lymphadenopathy:      Cervical: No cervical adenopathy.   Skin:     General: Skin is warm and dry.      Capillary Refill: Capillary refill takes less than 2 seconds.   Neurological:      Mental Status: She is alert and oriented to person, place, and time.      Cranial Nerves: No cranial nerve deficit.      Coordination: Coordination normal.   Psychiatric:         Mood and Affect: Mood normal.         " Behavior: Behavior normal.         BMI is >= 30 and <35. (Class 1 Obesity). The following options were offered after discussion;: exercise counseling/recommendations and nutrition counseling/recommendations      Results Reviewed:  Glucose   Date Value Ref Range Status   12/23/2021 103 (H) 65 - 99 mg/dL Final     BUN   Date Value Ref Range Status   12/23/2021 18 8 - 23 mg/dL Final     Creatinine   Date Value Ref Range Status   12/23/2021 0.98 0.57 - 1.00 mg/dL Final   08/19/2019 1.00 0.60 - 1.30 mg/dL Final     Comment:     Serial Number: [id number]:  [id number]     Sodium   Date Value Ref Range Status   12/23/2021 140 136 - 145 mmol/L Final     Potassium   Date Value Ref Range Status   12/23/2021 3.9 3.5 - 5.2 mmol/L Final     Chloride   Date Value Ref Range Status   12/23/2021 104 98 - 107 mmol/L Final     CO2   Date Value Ref Range Status   12/23/2021 28.0 22.0 - 29.0 mmol/L Final     Calcium   Date Value Ref Range Status   12/23/2021 9.3 8.6 - 10.5 mg/dL Final     ALT (SGPT)   Date Value Ref Range Status   12/23/2021 20 1 - 33 U/L Final     AST (SGOT)   Date Value Ref Range Status   12/23/2021 31 1 - 32 U/L Final     WBC   Date Value Ref Range Status   12/23/2021 3.49 3.40 - 10.80 10*3/mm3 Final   11/12/2021 4.05 3.40 - 10.80 10*3/mm3 Final     Hematocrit   Date Value Ref Range Status   12/23/2021 38.7 34.0 - 46.6 % Final     Platelets   Date Value Ref Range Status   12/23/2021 177 140 - 450 10*3/mm3 Final     Triglycerides   Date Value Ref Range Status   11/12/2021 77 0 - 150 mg/dL Final     HDL Cholesterol   Date Value Ref Range Status   11/12/2021 60 40 - 60 mg/dL Final     LDL Chol Calc (NIH)   Date Value Ref Range Status   11/12/2021 96 0 - 100 mg/dL Final     Hemoglobin A1C   Date Value Ref Range Status   11/12/2021 4.90 4.80 - 5.60 % Final     Comment:     Hemoglobin A1C Ranges:  Increased Risk for Diabetes  5.7% to 6.4%  Diabetes                     >= 6.5%  Diabetic Goal                < 7.0%      05/06/2021 5.3 % Final         Assessment / Plan     Assessment/Plan:  1. Situational mixed anxiety and depressive disorder    - busPIRone (BUSPAR) 5 MG tablet; Take 1 tablet by mouth 3 (Three) Times a Day As Needed (anxiety). 1 tablet in evening  Dispense: 90 tablet; Refill: 5    2. Benign hypertension    - Comprehensive metabolic panel; Future    3. Gastroesophageal reflux disease without esophagitis    - CBC w AUTO Differential; Future    4. Hypothyroidism (acquired)    - TSH Rfx On Abnormal To Free T4; Future    5. Skin lesion    - Ambulatory Referral to Dermatology    6. IFG (impaired fasting glucose)    - POC Glycated Hemoglobin, Total    7. Pure hypercholesterolemia    - Lipid panel; Future    Patient is going to continue current medications. We have discussed methods for losing weight pretty much every time. I mentioned something she is unable to do because of some physical limitation. As far as exercise, she is going to maybe try a stretching class at [employer]. I do highly recommend portion control, eating one portion instead of multiple portions of the same food. Flu shot was discussed, do recommend she take that in 10/2022. She is to monitor blood pressure, goal is less than 130/80. Monitor for increased swelling in her extremities. If this continues, then she is to return to the clinic. Follow up is in 6 months unless she has a problem.        Return in about 6 months (around 2/28/2023). unless patient needs to be seen sooner or acute issues arise.      I have discussed the patient results/orders and and plan/recommendation with them at today's visit.      Nasra Jarrett DO   08/30/2022      Transcribed from ambient dictation for Nasra Jarrett DO by RON CORNELIUS.  08/30/22   18:56 CDT    Patient verbalized consent to the visit recording.

## 2022-08-31 DIAGNOSIS — K21.9 GASTROESOPHAGEAL REFLUX DISEASE WITHOUT ESOPHAGITIS: ICD-10-CM

## 2022-08-31 DIAGNOSIS — F43.23 SITUATIONAL MIXED ANXIETY AND DEPRESSIVE DISORDER: ICD-10-CM

## 2022-08-31 DIAGNOSIS — E03.9 HYPOTHYROIDISM (ACQUIRED): ICD-10-CM

## 2022-08-31 DIAGNOSIS — I10 BENIGN HYPERTENSION: ICD-10-CM

## 2022-08-31 DIAGNOSIS — E78.00 PURE HYPERCHOLESTEROLEMIA: ICD-10-CM

## 2022-08-31 RX ORDER — BUSPIRONE HYDROCHLORIDE 5 MG/1
5 TABLET ORAL 2 TIMES DAILY PRN
Qty: 90 TABLET | Refills: 1
Start: 2022-08-31 | End: 2023-01-12

## 2022-08-31 NOTE — TELEPHONE ENCOUNTER
Pharmacy faxed to clarify directions, as we sent Rx that read 1 tid prn, take one every evening.  Called pt - she says she takes one every evening and sometimes takes one in the morning, but never more than 2/day.  Will send fax back with directions of 1 bid prn #90 x 1 refill.

## 2022-09-01 LAB
ALBUMIN SERPL-MCNC: 4.3 G/DL (ref 3.5–5.2)
ALBUMIN/GLOB SERPL: 2.7 G/DL
ALP SERPL-CCNC: 90 U/L (ref 39–117)
ALT SERPL-CCNC: 15 U/L (ref 1–33)
AST SERPL-CCNC: 22 U/L (ref 1–32)
BASOPHILS # BLD AUTO: 0.02 10*3/MM3 (ref 0–0.2)
BASOPHILS NFR BLD AUTO: 0.6 % (ref 0–1.5)
BILIRUB SERPL-MCNC: 0.3 MG/DL (ref 0–1.2)
BUN SERPL-MCNC: 14 MG/DL (ref 8–23)
BUN/CREAT SERPL: 13.1 (ref 7–25)
CALCIUM SERPL-MCNC: 9.3 MG/DL (ref 8.6–10.5)
CHLORIDE SERPL-SCNC: 106 MMOL/L (ref 98–107)
CHOLEST SERPL-MCNC: 164 MG/DL (ref 0–200)
CO2 SERPL-SCNC: 29 MMOL/L (ref 22–29)
CREAT SERPL-MCNC: 1.07 MG/DL (ref 0.57–1)
EGFRCR-CYS SERPLBLD CKD-EPI 2021: 53.9 ML/MIN/1.73
EOSINOPHIL # BLD AUTO: 0.06 10*3/MM3 (ref 0–0.4)
EOSINOPHIL NFR BLD AUTO: 1.7 % (ref 0.3–6.2)
ERYTHROCYTE [DISTWIDTH] IN BLOOD BY AUTOMATED COUNT: 12.1 % (ref 12.3–15.4)
GLOBULIN SER CALC-MCNC: 1.6 GM/DL
GLUCOSE SERPL-MCNC: 93 MG/DL (ref 65–99)
HCT VFR BLD AUTO: 40.8 % (ref 34–46.6)
HDLC SERPL-MCNC: 53 MG/DL (ref 40–60)
HGB BLD-MCNC: 13.3 G/DL (ref 12–15.9)
IMM GRANULOCYTES # BLD AUTO: 0.01 10*3/MM3 (ref 0–0.05)
IMM GRANULOCYTES NFR BLD AUTO: 0.3 % (ref 0–0.5)
LDLC SERPL CALC-MCNC: 96 MG/DL (ref 0–100)
LYMPHOCYTES # BLD AUTO: 0.94 10*3/MM3 (ref 0.7–3.1)
LYMPHOCYTES NFR BLD AUTO: 26.6 % (ref 19.6–45.3)
MCH RBC QN AUTO: 31.3 PG (ref 26.6–33)
MCHC RBC AUTO-ENTMCNC: 32.6 G/DL (ref 31.5–35.7)
MCV RBC AUTO: 96 FL (ref 79–97)
MONOCYTES # BLD AUTO: 0.38 10*3/MM3 (ref 0.1–0.9)
MONOCYTES NFR BLD AUTO: 10.7 % (ref 5–12)
NEUTROPHILS # BLD AUTO: 2.13 10*3/MM3 (ref 1.7–7)
NEUTROPHILS NFR BLD AUTO: 60.1 % (ref 42.7–76)
NRBC BLD AUTO-RTO: 0 /100 WBC (ref 0–0.2)
PLATELET # BLD AUTO: 181 10*3/MM3 (ref 140–450)
POTASSIUM SERPL-SCNC: 4.8 MMOL/L (ref 3.5–5.2)
PROT SERPL-MCNC: 5.9 G/DL (ref 6–8.5)
RBC # BLD AUTO: 4.25 10*6/MM3 (ref 3.77–5.28)
SODIUM SERPL-SCNC: 142 MMOL/L (ref 136–145)
TRIGL SERPL-MCNC: 79 MG/DL (ref 0–150)
TSH SERPL DL<=0.005 MIU/L-ACNC: 1 UIU/ML (ref 0.27–4.2)
VLDLC SERPL CALC-MCNC: 15 MG/DL (ref 5–40)
WBC # BLD AUTO: 3.54 10*3/MM3 (ref 3.4–10.8)

## 2022-09-06 ENCOUNTER — TELEPHONE (OUTPATIENT)
Dept: INTERNAL MEDICINE | Facility: CLINIC | Age: 77
End: 2022-09-06

## 2022-09-06 RX ORDER — CELECOXIB 200 MG/1
CAPSULE ORAL
Qty: 90 CAPSULE | Refills: 3 | Status: SHIPPED | OUTPATIENT
Start: 2022-09-06

## 2022-09-06 NOTE — TELEPHONE ENCOUNTER
Called pt - she says the edema is new, was not present at her 8/30 OV, so told her she needed to be seen to have this evaluated.  Appt made for Friday with Dr. Jarrett.  Offered sooner appt with one of the N.P.'s, but she says Friday is fine, but will call for sooner appt if something else changes.

## 2022-09-06 NOTE — TELEPHONE ENCOUNTER
.  Caller: Thania Casiano    Relationship: Self    Best call back number: [phone]    What is the best time to reach you:SOON PLEASE     Who are you requesting to speak with (clinical staff, provider,  specific staff member): CLINICAL STAFF        What was the call regarding: PATIENT REQUESTING A CALL BACK TO DISCUSS SOME QUESTIONS SHE HAS ABOUT HER RECENT LAB RESULTS AND ABOUT SOME OF THE SYMPTOMS SHE IS HAVING   PATIENT STATES SHE WAS RECENTLY ON STEROIDS AND WOULD LIKE TO KNOW IF THAT COULD HAVE EFFECTED HER CREATINE AND ALSO STATES SHE IS GAINING WEIGHT LIKE A POUND TO A POUND AND A HALF A DAY AND IS SWELLING       Do you require a callback: YES

## 2022-09-09 ENCOUNTER — OFFICE VISIT (OUTPATIENT)
Dept: INTERNAL MEDICINE | Facility: CLINIC | Age: 77
End: 2022-09-09

## 2022-09-09 VITALS
WEIGHT: 207 LBS | OXYGEN SATURATION: 98 % | TEMPERATURE: 97.1 F | HEART RATE: 77 BPM | BODY MASS INDEX: 34.49 KG/M2 | HEIGHT: 65 IN | DIASTOLIC BLOOD PRESSURE: 76 MMHG | SYSTOLIC BLOOD PRESSURE: 134 MMHG

## 2022-09-09 DIAGNOSIS — R60.0 FLUID COLLECTION (EDEMA) IN THE ARMS, LEGS, HANDS AND FEET: Primary | ICD-10-CM

## 2022-09-09 PROCEDURE — 99213 OFFICE O/P EST LOW 20 MIN: CPT | Performed by: FAMILY MEDICINE
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
## 2022-09-13 LAB
ANA SER QL IF: POSITIVE
ANA SPECKLED TITR SER: ABNORMAL {TITER}
BUN SERPL-MCNC: 26 MG/DL (ref 8–27)
BUN/CREAT SERPL: 26 (ref 12–28)
CALCIUM SERPL-MCNC: 10.7 MG/DL (ref 8.7–10.3)
CCP IGA+IGG SERPL IA-ACNC: 3 UNITS (ref 0–19)
CENTROMERE B AB SER-ACNC: <0.2 AI (ref 0–0.9)
CHLORIDE SERPL-SCNC: 102 MMOL/L (ref 96–106)
CHROMATIN AB SERPL-ACNC: <0.2 AI (ref 0–0.9)
CO2 SERPL-SCNC: 21 MMOL/L (ref 20–29)
CREAT SERPL-MCNC: 0.99 MG/DL (ref 0.57–1)
DSDNA AB SER-ACNC: <1 IU/ML (ref 0–9)
EGFRCR-CYS SERPLBLD CKD-EPI 2021: 59 ML/MIN/1.73
ENA JO1 AB SER-ACNC: <0.2 AI (ref 0–0.9)
ENA RNP AB SER-ACNC: <0.2 AI (ref 0–0.9)
ENA SCL70 AB SER-ACNC: <0.2 AI (ref 0–0.9)
ENA SM AB SER-ACNC: <0.2 AI (ref 0–0.9)
ENA SS-A AB SER-ACNC: <0.2 AI (ref 0–0.9)
ENA SS-B AB SER-ACNC: <0.2 AI (ref 0–0.9)
GLUCOSE SERPL-MCNC: 101 MG/DL (ref 65–99)
LABORATORY COMMENT REPORT: ABNORMAL
Lab: ABNORMAL
Lab: ABNORMAL
POTASSIUM SERPL-SCNC: 4.3 MMOL/L (ref 3.5–5.2)
RHEUMATOID FACT SERPL-ACNC: 10.6 IU/ML
SODIUM SERPL-SCNC: 141 MMOL/L (ref 134–144)

## 2022-09-14 DIAGNOSIS — R76.8 POSITIVE ANA (ANTINUCLEAR ANTIBODY): Primary | ICD-10-CM

## 2022-09-23 ENCOUNTER — OFFICE VISIT (OUTPATIENT)
Dept: PULMONOLOGY | Facility: CLINIC | Age: 77
End: 2022-09-23

## 2022-09-23 VITALS
OXYGEN SATURATION: 100 % | BODY MASS INDEX: 34.66 KG/M2 | DIASTOLIC BLOOD PRESSURE: 84 MMHG | HEART RATE: 102 BPM | HEIGHT: 65 IN | SYSTOLIC BLOOD PRESSURE: 162 MMHG | WEIGHT: 208 LBS

## 2022-09-23 DIAGNOSIS — E66.9 OBESITY (BMI 30-39.9): ICD-10-CM

## 2022-09-23 DIAGNOSIS — U09.9 POST-COVID SYNDROME: ICD-10-CM

## 2022-09-23 DIAGNOSIS — J45.30 MILD PERSISTENT ASTHMA WITHOUT COMPLICATION: Primary | ICD-10-CM

## 2022-09-23 DIAGNOSIS — J45.30 MILD PERSISTENT ASTHMA WITHOUT COMPLICATION: ICD-10-CM

## 2022-09-23 DIAGNOSIS — R91.1 LUNG NODULE: ICD-10-CM

## 2022-09-23 PROCEDURE — 94010 BREATHING CAPACITY TEST: CPT | Performed by: INTERNAL MEDICINE

## 2022-09-23 PROCEDURE — 99214 OFFICE O/P EST MOD 30 MIN: CPT | Performed by: INTERNAL MEDICINE

## 2022-09-23 PROCEDURE — 94729 DIFFUSING CAPACITY: CPT | Performed by: INTERNAL MEDICINE

## 2022-09-23 PROCEDURE — 94727 GAS DIL/WSHOT DETER LNG VOL: CPT | Performed by: INTERNAL MEDICINE

## 2022-09-23 NOTE — PATIENT INSTRUCTIONS
The patient's pulmonary functions are stable and remain normal.  I reviewed these with her.  We will plan a follow-up visit in 1 year.  She still is very sensitive to airway irritants and will continue her inhaled medications.  She does appear to have recovered well from her prior COVID-19 infection although I told her she still may have some issues with increased sensitivity to airway irritants related to her prior COVID-19 infection as well.

## 2022-09-23 NOTE — PROGRESS NOTES
Patient had a negative COVID screening. PFT performed. See scanned results for additional information.

## 2022-09-23 NOTE — PROCEDURES
Full Pulmonary Function Test Without Bronchodilator  Performed by: David Romano CMA  Authorized by: Natan Hughes MD      Pre Drug % Predicted    FVC: 88%   FEV1: 88%   FEF 25-75%: 88%   FEV1/FVC: 77%   T%   RV: 112%   DLCO: 133%   D/VAsb: 124%    Interpretation   Spirometry   Spirometry shows normal results.   Diffusion Capacity  The patient's diffusion capacity is normal.  Diffusion capacity is normal when corrected for alveolar volume.   Overall comments: The patient's spirometry is within normal limits.  Lung volumes are within normal limits with the exception of a decrease in expiratory reserve volume.  Diffusion capacity is within normal limits when current studies are compared to [facility] from December 10, 2018, there is no change in her FVC and a minimal but not significant drop in her FEV1 compared to previous.

## 2022-09-24 NOTE — ASSESSMENT & PLAN NOTE
Patient's (Body mass index is 34.61 kg/m².) indicates that they are obese (BMI >30) with health conditions that include GERD . Weight is unchanged.  Diet and exercise are encouraged and she will follow-up with her primary healthcare provider regarding her elevated BMI otherwise.

## 2022-09-24 NOTE — ASSESSMENT & PLAN NOTE
Pulmonary functions performed today reveal normal spirometry, normal diffusion capacity, and just some minor abnormalities on her lung volumes in terms of a decrease in expiratory reserve volume.

## 2022-09-24 NOTE — PROGRESS NOTES
Chief Complaint  mild persistent asthma without complication    Subjective    History of Present Illness     Thania Casiano presents to [facility] for asthma.    History of Present Illness   The patient states she is feeling much better and has recovered well from her COVID-19 infection although she still states she is sensitive to airway irritants.  This was a problem even pre-COVID but has been more significant since she contracted COVID.  Again however overall she is doing well on her current inhaled medication regimen.  Pulmonary functions today are quite stable compared to studies performed at the respiratory disease clinic on December 10, 2018 in terms of spirometry.  Her lung volumes are normal with the exception of a decreased expiratory reserve volume.  Her diffusion capacity is within normal limits.  I reviewed the studies with her.  I told her I would just recommend she continue her current regimen and I will see her back in 1 year unless she has any acute respiratory problems in the interim.  She has had the COVID-19 vaccine including a booster in the form of the Moderna vaccine.  She also had the flu shot this past flu season.  Prior to Admission medications    Medication Sig Start Date End Date Taking? Authorizing Provider   acetaminophen (TYLENOL) 500 MG tablet Take 500 mg by mouth 2 (Two) Times a Day.   Yes Provider, MD Malcolm   buPROPion XL (WELLBUTRIN XL) 300 MG 24 hr tablet Take 1 tablet by mouth Daily. 5/27/22  Yes Marilyn Ferraro APRN   busPIRone (BUSPAR) 5 MG tablet Take 1 tablet by mouth 2 (Two) Times a Day As Needed (anxiety). 1 tablet in evening 8/31/22  Yes Nasra Jarrett DO   celecoxib (CeleBREX) 200 MG capsule TAKE 1 CAPSULE DAILY 9/6/22  Yes Nasra Jarrett DO   cloNIDine (CATAPRES) 0.1 MG tablet Take 1 tablet by mouth Every Night. 5/27/22  Yes Marilyn Ferraro APRN   Fluticasone-Salmeterol (Advair  "Diskus) 100-50 MCG/ACT DISKUS Inhale 1 puff 2 (Two) Times a Day. 22  Yes Natan Hughes MD   levothyroxine (SYNTHROID, LEVOTHROID) 100 MCG tablet Take 1 tablet by mouth Daily. on an empty stomach 22  Yes Nasra Jarrett DO   loratadine (Claritin) 10 MG tablet Take 1 tablet by mouth Daily. 21  Yes Cassie Peres APRN   LUMIGAN 0.01 % ophthalmic drops  17  Yes Malcolm Crowell MD   pantoprazole (PROTONIX) 40 MG EC tablet TAKE 1 TABLET BY MOUTH ONCE DAILY 3/28/22  Yes Bryan Warner MD   ProAir  (90 Base) MCG/ACT inhaler INHALE 2 PUFFS BY MOUTH EVERY 4 HOURS AS NEEDED FOR WHEEZING. 3/21/22  Yes Natan Hughes MD   spironolactone (ALDACTONE) 25 MG tablet Take 1 tablet by mouth Daily. 22  Yes Nasra Jarrett DO   theophylline (THEODUR) 300 MG 12 hr tablet Take 1 tablet by mouth 2 (Two) Times a Day. 22  Yes Natan Hughes MD   traZODone (DESYREL) 50 MG tablet Take 1 tablet by mouth every night at bedtime. 22  Yes Dilcia Odonnell APRN   vitamin D (ERGOCALCIFEROL) 1.25 MG (72909 UT) capsule capsule Take 1 capsule by mouth Every 7 (Seven) Days. 3/21/22  Yes Cassie Peres APRN       Social History     Socioeconomic History   • Marital status:    Tobacco Use   • Smoking status: Former Smoker     Packs/day: 1.00     Years: 25.00     Pack years: 25.00     Quit date:      Years since quittin.7   • Smokeless tobacco: Never Used   • Tobacco comment: off and on    Vaping Use   • Vaping Use: Never used   Substance and Sexual Activity   • Alcohol use: Yes     Comment: occ wine   • Drug use: No   • Sexual activity: Not Currently     Partners: Male     Birth control/protection: Surgical       Objective   Vital Signs:   /84   Pulse 102   Ht 165.1 cm (65\")   Wt 94.3 kg (208 lb)   SpO2 100% Comment: RODRICK  BMI 34.61 kg/m²     Physical Exam  Vitals and nursing note reviewed.   Constitutional:       " Appearance: She is obese.      Comments: Her systolic blood pressure is somewhat elevated and she will follow-up with her primary healthcare provider on this.   HENT:      Head: Normocephalic.      Comments: She is wearing a mask.  Eyes:      Extraocular Movements: Extraocular movements intact.      Pupils: Pupils are equal, round, and reactive to light.   Cardiovascular:      Rate and Rhythm: Normal rate and regular rhythm.      Comments: Again her systolic blood pressure was somewhat elevated.  She will follow-up with her primary care physician on this.  Pulmonary:      Effort: Pulmonary effort is normal.      Breath sounds: Normal breath sounds.   Musculoskeletal:         General: Normal range of motion.   Skin:     General: Skin is warm and dry.   Neurological:      General: No focal deficit present.      Mental Status: She is alert and oriented to person, place, and time.   Psychiatric:         Mood and Affect: Mood normal.         Behavior: Behavior normal.        Result Review :    PFT Values        Some values may be hidden. Unless noted otherwise, only the newest values recorded on each date are displayed.         Old Values PFT Results 22   No data to display.      Pre Drug PFT Results 22   FVC 88   FEV1 88   FEF 25-75% 88   FEV1/FVC 77      Post Drug PFT Results 22   No data to display.      Other Tests PFT Results 22         DLCO 133   D/VAsb 124               Results for orders placed in visit on 22    Full Pulmonary Function Test Without Bronchodilator    Narrative  Full Pulmonary Function Test Without Bronchodilator  Performed by: David Romano CMA  Authorized by: Natan Hughes MD    Pre Drug % Predicted  FVC: 88%  FEV1: 88%  FEF 25-75%: 88%  FEV1/FVC: 77%  T%  RV: 112%  DLCO: 133%  D/VAsb: 124%    Interpretation  Spirometry  Spirometry shows normal results.  Diffusion Capacity  The patient's diffusion capacity is normal.  Diffusion capacity is  normal when corrected for alveolar volume.  Overall comments: The patient's spirometry is within normal limits.  Lung volumes are within normal limits with the exception of a decrease in expiratory reserve volume.  Diffusion capacity is within normal limits when current studies are compared to studies performed [facility] December 10, 2018, there is no change in her FVC and a minimal but not significant drop in her FEV1 compared to previous.      Results for orders placed in visit on 12/10/18    Pulmonary Function Test                 My interpretation of imaging:   CT Chest Without Contrast Diagnostic (06/24/2022 08:55)        Assessment and Plan     Diagnoses and all orders for this visit:    1. Mild persistent asthma without complication (Primary)  Assessment & Plan:  Pulmonary functions performed today reveal normal spirometry, normal diffusion capacity, and just some minor abnormalities on her lung volumes in terms of a decrease in expiratory reserve volume.          2. Post-COVID syndrome  Assessment & Plan:  She has improvement in her symptoms of shortness of breath and fatigue although she still is quite sensitive to airway irritants.      3. Lung nodule  Assessment & Plan:  This was stable on her recent chest CT.      4. Obesity (BMI 30-39.9)  Assessment & Plan:  Patient's (Body mass index is 34.61 kg/m².) indicates that they are obese (BMI >30) with health conditions that include GERD . Weight is unchanged.  Diet and exercise are encouraged and she will follow-up with her primary healthcare provider regarding her elevated BMI otherwise.            Natan Hughes MD  9/23/2022  19:09 CDT    Follow Up   Return in about 1 year (around 9/23/2023) for To see me specifically.    Patient was given instructions and counseling regarding her condition or for health maintenance advice. Please see specific information pulled into the AVS if appropriate.

## 2022-11-02 DIAGNOSIS — F43.23 SITUATIONAL MIXED ANXIETY AND DEPRESSIVE DISORDER: ICD-10-CM

## 2022-11-02 DIAGNOSIS — I10 BENIGN HYPERTENSION: ICD-10-CM

## 2022-11-02 RX ORDER — BUPROPION HYDROCHLORIDE 300 MG/1
300 TABLET ORAL DAILY
Qty: 90 TABLET | Refills: 1 | Status: SHIPPED | OUTPATIENT
Start: 2022-11-02

## 2022-11-02 RX ORDER — CLONIDINE HYDROCHLORIDE 0.1 MG/1
0.1 TABLET ORAL NIGHTLY
Qty: 90 TABLET | Refills: 1 | Status: SHIPPED | OUTPATIENT
Start: 2022-11-02

## 2022-11-02 NOTE — TELEPHONE ENCOUNTER
Caller: MISHEL BAXTER   Relationship: SELF     Best call back number:    [phone] (Mobile)         Requested Prescriptions: 90 DAY SUPPLY      buPROPion XL (WELLBUTRIN XL) 300 MG 24 hr tablet  300 mg, Daily     cloNIDine (CATAPRES) 0.1 MG tablet  0.1 mg, Nightly           Pharmacy where request should be sent:    [facility] - [address] - [phone] [facility] [phone] [phone]  Additional details provided by patient: NONE   Does the patient have less than a 3 day supply:  [] Yes  [x] No    Juany Wong Rep   11/02/22 14:37 CDT

## 2022-11-04 DIAGNOSIS — J45.30 MILD PERSISTENT ASTHMA WITHOUT COMPLICATION: ICD-10-CM

## 2022-11-04 DIAGNOSIS — J30.89 NON-SEASONAL ALLERGIC RHINITIS, UNSPECIFIED TRIGGER: ICD-10-CM

## 2022-11-04 DIAGNOSIS — R09.81 SINUS CONGESTION: ICD-10-CM

## 2022-11-04 RX ORDER — ALBUTEROL SULFATE 90 UG/1
2 AEROSOL, METERED RESPIRATORY (INHALATION) EVERY 4 HOURS PRN
Qty: 25.5 G | Refills: 3 | Status: SHIPPED | OUTPATIENT
Start: 2022-11-04

## 2022-11-04 RX ORDER — LORATADINE 10 MG/1
10 TABLET ORAL DAILY
Qty: 90 TABLET | Refills: 3 | Status: SHIPPED | OUTPATIENT
Start: 2022-11-04

## 2022-11-04 NOTE — TELEPHONE ENCOUNTER
[facility] faxed us regarding needing a refill on albuterol inhaler.    Rx Refill Note  Requested Prescriptions     Pending Prescriptions Disp Refills   • albuterol sulfate HFA (ProAir HFA) 108 (90 Base) MCG/ACT inhaler 25.5 g 3     Sig: Inhale 2 puffs Every 4 (Four) Hours As Needed for Wheezing.      Last office visit with prescribing clinician: 9/23/2022      Next office visit with prescribing clinician: 9/26/2023            Qi Moreira MA  11/04/22, 09:37 CDT     Dr Hughes is out so sending to Denise Quan since she has seen patient in the past.

## 2023-01-12 DIAGNOSIS — F43.23 SITUATIONAL MIXED ANXIETY AND DEPRESSIVE DISORDER: ICD-10-CM

## 2023-01-12 RX ORDER — BUSPIRONE HYDROCHLORIDE 5 MG/1
TABLET ORAL
Qty: 90 TABLET | Refills: 3 | Status: SHIPPED | OUTPATIENT
Start: 2023-01-12 | End: 2023-03-06 | Stop reason: SDUPTHER

## 2023-01-17 DIAGNOSIS — J45.30 MILD PERSISTENT ASTHMA WITHOUT COMPLICATION: ICD-10-CM

## 2023-01-17 RX ORDER — FLUTICASONE PROPIONATE AND SALMETEROL 50; 100 UG/1; UG/1
POWDER RESPIRATORY (INHALATION)
Qty: 180 EACH | Refills: 3 | Status: SHIPPED | OUTPATIENT
Start: 2023-01-17

## 2023-01-17 NOTE — TELEPHONE ENCOUNTER
Pharmacy sent a request for refills on Advair. Refills passed protocol and sent to the pharmacy.  Rx Refill Note  Requested Prescriptions     Pending Prescriptions Disp Refills   • Advair Diskus 100-50 MCG/ACT DISKUS [Pharmacy Med Name: ADVAIR DISKUS 60'S 100/50MCG] 180 each 3     Sig: USE 1 INHALATION TWICE A DAY      Last office visit with prescribing clinician: 9/23/2022   Last telemedicine visit with prescribing clinician: Visit date not found   Next office visit with prescribing clinician: 9/26/2023                         Would you like a call back once the refill request has been completed: [] Yes [] No    If the office needs to give you a call back, can they leave a voicemail: [] Yes [] No    David Romano, [facility]  01/17/23, 09:36 CST

## 2023-01-19 ENCOUNTER — OFFICE VISIT (OUTPATIENT)
Dept: INTERNAL MEDICINE | Facility: CLINIC | Age: 78
End: 2023-01-19
Payer: MEDICARE

## 2023-01-19 VITALS
DIASTOLIC BLOOD PRESSURE: 70 MMHG | HEIGHT: 65 IN | TEMPERATURE: 97.9 F | OXYGEN SATURATION: 98 % | WEIGHT: 197 LBS | HEART RATE: 76 BPM | SYSTOLIC BLOOD PRESSURE: 134 MMHG | BODY MASS INDEX: 32.82 KG/M2

## 2023-01-19 DIAGNOSIS — R73.01 IFG (IMPAIRED FASTING GLUCOSE): ICD-10-CM

## 2023-01-19 DIAGNOSIS — I10 BENIGN HYPERTENSION: ICD-10-CM

## 2023-01-19 DIAGNOSIS — E55.9 VITAMIN D DEFICIENCY: ICD-10-CM

## 2023-01-19 DIAGNOSIS — Z79.899 ENCOUNTER FOR LONG-TERM CURRENT USE OF MEDICATION: ICD-10-CM

## 2023-01-19 DIAGNOSIS — E66.9 OBESITY (BMI 30-39.9): ICD-10-CM

## 2023-01-19 DIAGNOSIS — M35.3 POLYMYALGIA RHEUMATICA: ICD-10-CM

## 2023-01-19 DIAGNOSIS — Z78.0 POST-MENOPAUSAL: ICD-10-CM

## 2023-01-19 DIAGNOSIS — Z12.31 SCREENING MAMMOGRAM, ENCOUNTER FOR: Primary | ICD-10-CM

## 2023-01-19 DIAGNOSIS — U09.9 POST-COVID SYNDROME: ICD-10-CM

## 2023-01-19 DIAGNOSIS — E78.00 PURE HYPERCHOLESTEROLEMIA: ICD-10-CM

## 2023-01-19 DIAGNOSIS — E03.9 HYPOTHYROIDISM (ACQUIRED): ICD-10-CM

## 2023-01-19 PROBLEM — E66.3 OVERWEIGHT: Status: RESOLVED | Noted: 2020-05-11 | Resolved: 2023-01-19

## 2023-01-19 PROBLEM — H81.13 BPPV (BENIGN PAROXYSMAL POSITIONAL VERTIGO), BILATERAL: Status: RESOLVED | Noted: 2020-09-11 | Resolved: 2023-01-19

## 2023-01-19 PROBLEM — I51.9 MYXEDEMA HEART DISEASE: Status: RESOLVED | Noted: 2020-09-11 | Resolved: 2023-01-19

## 2023-01-19 PROBLEM — R22.1 MASS OF RIGHT SIDE OF NECK: Status: RESOLVED | Noted: 2017-12-06 | Resolved: 2023-01-19

## 2023-01-19 PROCEDURE — 1159F MED LIST DOCD IN RCRD: CPT | Performed by: INTERNAL MEDICINE

## 2023-01-19 PROCEDURE — 1160F RVW MEDS BY RX/DR IN RCRD: CPT | Performed by: INTERNAL MEDICINE

## 2023-01-19 PROCEDURE — 1126F AMNT PAIN NOTED NONE PRSNT: CPT | Performed by: INTERNAL MEDICINE

## 2023-01-19 PROCEDURE — G0439 PPPS, SUBSEQ VISIT: HCPCS | Performed by: INTERNAL MEDICINE

## 2023-01-19 PROCEDURE — 1170F FXNL STATUS ASSESSED: CPT | Performed by: INTERNAL MEDICINE

## 2023-01-19 PROCEDURE — 99214 OFFICE O/P EST MOD 30 MIN: CPT | Performed by: INTERNAL MEDICINE

## 2023-01-19 RX ORDER — MULTIPLE VITAMINS W/ MINERALS TAB 9MG-400MCG
1 TAB ORAL DAILY
COMMUNITY

## 2023-01-19 RX ORDER — VIT C/B6/B5/MAGNESIUM/HERB 173 50-5-6-5MG
500 CAPSULE ORAL DAILY
COMMUNITY

## 2023-01-19 NOTE — ASSESSMENT & PLAN NOTE
Patient's TSH was noted to be 0.99, patient may be over suppressed now that she has lost some weight.  We may need to back off of her levothyroxine.    Patient symptoms may be related to hyperthyroidism.  Patient is noted to have anti-TPO antibodies.

## 2023-01-19 NOTE — ASSESSMENT & PLAN NOTE
The patient's inflammatory issues may be related to her post-COVID syndrome.  The patient had several courses of steroids however that did not significantly improve her symptoms of her lower extremity and hand burning and swelling.

## 2023-01-19 NOTE — ASSESSMENT & PLAN NOTE
Patient's diet has continued to improve, and her weight will likely continue to improve with it.  Patient is going to likely start easing into having some red meat and some carbohydrates, but going to mainly stick with the diet that she has been doing.

## 2023-01-19 NOTE — PROGRESS NOTES
The ABCs of the Annual Wellness Visit  Subsequent Medicare Wellness Visit    Chief Complaint   Patient presents with   • Medicare Wellness-subsequent     Labs [id number]:   • review labs done in september by michaelle; printed      Was sent to Dr. Moore       Subjective    History of Present Illness:  Thania Casiano is a 77 y.o. female who presents for a Subsequent Medicare Wellness Visit.    The following portions of the patient's history were reviewed and   updated as appropriate: allergies, current medications, past family history, past medical history, past social history, past surgical history and problem list.    Compared to one year ago, the patient feels her physical   health is better.    Compared to one year ago, the patient feels her mental   health is better.    Recent Hospitalizations:  She was not admitted to the hospital during the last year.       Current Medical Providers:  Patient Care Team:  Dilcia Odonnell APRN as PCP - General (Internal Medicine)  Bryan Warner MD as Consulting Physician (Gastroenterology)  Denise Quan APRN as Nurse Practitioner (Pulmonary Disease)  Kirsten Caballero MD as Consulting Physician (Pulmonary Disease)  Natan Hughes MD as Consulting Physician (Pulmonary Disease)    Outpatient Medications Prior to Visit   Medication Sig Dispense Refill   • acetaminophen (TYLENOL) 500 MG tablet Take 500 mg by mouth 2 (Two) Times a Day.     • Advair Diskus 100-50 MCG/ACT DISKUS USE 1 INHALATION TWICE A  each 3   • albuterol sulfate HFA (ProAir HFA) 108 (90 Base) MCG/ACT inhaler Inhale 2 puffs Every 4 (Four) Hours As Needed for Wheezing. 25.5 g 3   • buPROPion XL (WELLBUTRIN XL) 300 MG 24 hr tablet Take 1 tablet by mouth Daily. 90 tablet 1   • busPIRone (BUSPAR) 5 MG tablet TAKE 1 TABLET TWICE A DAY AS NEEDED 90 tablet 3   • celecoxib (CeleBREX) 200 MG capsule TAKE 1 CAPSULE DAILY 90 capsule 3   • cloNIDine (CATAPRES) 0.1 MG tablet Take 1  tablet by mouth Every Night. 90 tablet 1   • levothyroxine (SYNTHROID, LEVOTHROID) 100 MCG tablet Take 1 tablet by mouth Daily. on an empty stomach 90 tablet 3   • loratadine (Claritin) 10 MG tablet Take 1 tablet by mouth Daily. 90 tablet 3   • LUMIGAN 0.01 % ophthalmic drops      • multivitamin with minerals tablet tablet Take 1 tablet by mouth Daily.     • spironolactone (ALDACTONE) 25 MG tablet Take 1 tablet by mouth Daily. 90 tablet 3   • theophylline (THEODUR) 300 MG 12 hr tablet Take 1 tablet by mouth 2 (Two) Times a Day. 180 tablet 3   • traZODone (DESYREL) 50 MG tablet Take 1 tablet by mouth every night at bedtime. 90 tablet 3   • Turmeric 500 MG capsule Take  by mouth.     • vitamin D (ERGOCALCIFEROL) 1.25 MG (48950 UT) capsule capsule Take 1 capsule by mouth Every 7 (Seven) Days. 12 capsule 3   • pantoprazole (PROTONIX) 40 MG EC tablet TAKE 1 TABLET BY MOUTH ONCE DAILY 30 tablet 1     No facility-administered medications prior to visit.       No opioid medication identified on active medication list. I have reviewed chart for other potential  high risk medication/s and harmful drug interactions in the elderly.          Aspirin is not on active medication list.  Aspirin use is not indicated based on review of current medical condition/s. Risk of harm outweighs potential benefits.  .    Patient Active Problem List   Diagnosis   • Carpal tunnel syndrome of right wrist   • Personal history of nicotine dependence   • Neoplasm of uncertain behavior of neck   • Non-smoker   • Benign neoplasm of neck   • Obesity (BMI 30-39.9)   • Mild intermittent asthma   • Mild persistent asthma without complication   • Gastroesophageal reflux disease without esophagitis   • Family history of polyps in the colon   • Left upper quadrant abdominal pain   • Nausea   • Constipation   • Family hx of colon cancer   • Family hx colonic polyps   • Asthma   • Breast mass   • Vitamin D deficiency   • Anxiety and depression   • Polymyalgia  "rheumatica (HCC)   • Osteopenia   • LAFB (left anterior fascicular block)   • Insomnia due to stress   • Impaired fasting glucose   • Hypokalemia   • Gastroenteritis   • Cervical radiculitis   • Benign hypertension   • Allergic-infective asthma   • Lung nodule   • Pulmonary infiltrate   • Post-COVID syndrome   • Hypothyroidism (acquired)     Advance Care Planning  Advance Directive is not on file.  ACP discussion was held with the patient during this visit. Patient has an advance directive (not in EMR), copy requested.        Objective    Vitals:    23 1033   BP: 134/70   BP Location: Left arm   Patient Position: Sitting   Cuff Size: Adult   Pulse: 76   Temp: 97.9 °F (36.6 °C)   TempSrc: Temporal   SpO2: 98%   Weight: 89.4 kg (197 lb)   Height: 165.1 cm (65\")   PainSc: 0-No pain     Estimated body mass index is 32.78 kg/m² as calculated from the following:    Height as of this encounter: 165.1 cm (65\").    Weight as of this encounter: 89.4 kg (197 lb).    BMI is >= 30 and <35. (Class 1 Obesity). The following options were offered after discussion;: none (medical contraindication)      Does the patient have evidence of cognitive impairment? No                HEALTH RISK ASSESSMENT    Smoking Status:  Social History     Tobacco Use   Smoking Status Former   • Packs/day: 1.00   • Years: 25.00   • Pack years: 25.00   • Types: Cigarettes   • Quit date:    • Years since quittin.0   Smokeless Tobacco Never   Tobacco Comments    off and on      Alcohol Consumption:  Social History     Substance and Sexual Activity   Alcohol Use Yes    Comment: occ wine     [facility] Risk Screen:    [facility]    Depression Screening:  PHQ-2/PHQ-9 Depression Screening 2022   Retired PHQ-9 Total Score 0   Retired Total Score 0       Health Habits and Functional and Cognitive Screening:  Functional & Cognitive Status 11/15/2021   Do you have difficulty preparing food and eating? No   Do " you have difficulty bathing yourself, getting dressed or grooming yourself? No   Do you have difficulty using the toilet? No   Do you have difficulty moving around from place to place? No   Do you have trouble with steps or getting out of a bed or a chair? No   Current Diet Well Balanced Diet   Dental Exam Up to date   Eye Exam Up to date   Exercise (times per week) 3 times per week   Current Exercises Include Walking   Current Exercise Activities Include -   Do you need help using the phone?  No   Are you deaf or do you have serious difficulty hearing?  No   Do you need help with transportation? No   Do you need help shopping? No   Do you need help preparing meals?  No   Do you need help with housework?  No   Do you need help with laundry? No   Do you need help taking your medications? No   Do you need help managing money? No   Do you ever drive or ride in a car without wearing a seat belt? No   Have you felt unusual stress, anger or loneliness in the last month? No   Who do you live with? Spouse   If you need help, do you have trouble finding someone available to you? No   Have you been bothered in the last four weeks by sexual problems? No   Do you have difficulty concentrating, remembering or making decisions? Yes       Age-appropriate Screening Schedule:  Refer to the list below for future screening recommendations based on patient's age, sex and/or medical conditions. Orders for these recommended tests are listed in the plan section. The patient has been provided with a written plan.    Health Maintenance   Topic Date Due   • URINE MICROALBUMIN  Never done   • TDAP/TD VACCINES (1 - Tdap) Never done   • ZOSTER VACCINE (2 of 2) 01/10/2021   • DXA SCAN  11/24/2022   • DIABETIC EYE EXAM  01/07/2023   • HEMOGLOBIN A1C  02/28/2023   • LIPID PANEL  08/31/2023   • MAMMOGRAM  06/10/2024   • INFLUENZA VACCINE  Completed              Assessment & Plan   CMS Preventative Services Quick Reference  Risk Factors Identified  "During Encounter  Fall Risk-High or Moderate: Discussed Fall Prevention in the home  The above risks/problems have been discussed with the patient.  Follow up actions/plans if indicated are seen below in the Assessment/Plan Section.  Pertinent information has been shared with the patient in the After Visit Summary.    Follow Up:   Return in about 6 months (around 7/19/2023), or if symptoms worsen or fail to improve, for Recheck, Annual physical.     An After Visit Summary and PPPS were made available to the patient.                   KIARA Tirado MD, FACP, [facility]      Electronically signed by Feliz Tirado MD, 01/19/23, 10:56 AM CST.    Additional E&M Note during same encounter follows:  Patient has multiple medical problems which are significant and separately identifiable that require additional work above and beyond the Medicare Wellness Visit.      Chief Complaint  Medicare Wellness-subsequent (Labs [id number]:) and review labs done in september by michaelle; printed  (Was sent to Dr. Moore )    Subjective        HPI  Thania Casiano is also being seen today for continued concern for \"inflammation\".  Patient had elevated CRP.  When I reviewed the patient's labs from Dr. Moore.  Patient was noted to have a positive DEXTER with speckled pattern of 1-160.  Patient was also noted to have normal CRP at that time.  Patient's complement was normal.  Patient's other markers were also noted to be normal.  Patient has persistently elevated alkaline phosphatase.  Patient has changed her diet to a \"anti-inflammatory diet\".  Patient is doing much better overall, and does have issues every once in a while.  Patient has swelling in her hands and erythema, but she feels as though this has greatly improved.  The patient does carry a history of polymyalgia rheumatica.  She had previously received injections at Dr. Moore's office, and this had significantly improved and subsequently resolved.      Objective "   Physical Exam  Vitals reviewed.   Constitutional:       Appearance: She is not ill-appearing.   HENT:      Head: Normocephalic and atraumatic.      Mouth/Throat:      Mouth: Mucous membranes are dry.      Pharynx: Oropharynx is clear.   Eyes:      General: No scleral icterus.     Conjunctiva/sclera: Conjunctivae normal.   Cardiovascular:      Rate and Rhythm: Normal rate and regular rhythm.   Pulmonary:      Effort: Pulmonary effort is normal. No respiratory distress.      Breath sounds: Normal breath sounds.   Abdominal:      General: Abdomen is flat. Bowel sounds are normal.      Palpations: There is no mass.   Skin:     General: Skin is warm and dry.   Neurological:      General: No focal deficit present.      Mental Status: She is alert and oriented to person, place, and time.                          Assessment and Plan   Diagnoses and all orders for this visit:    1. Screening mammogram, encounter for (Primary)  -     Mammo Screening Digital Tomosynthesis Bilateral With CAD; Future    2. Benign hypertension  -     CBC & Differential  -     Comprehensive Metabolic Panel    3. Vitamin D deficiency  -     Vitamin D,25-Hydroxy    4. Hypothyroidism (acquired)  Assessment & Plan:  Patient's TSH was noted to be 0.99, patient may be over suppressed now that she has lost some weight.  We may need to back off of her levothyroxine.    Patient symptoms may be related to hyperthyroidism.  Patient is noted to have anti-TPO antibodies.    Orders:  -     TSH Rfx On Abnormal To Free T4    5. IFG (impaired fasting glucose)    6. Pure hypercholesterolemia  -     Lipid Panel  -     TSH Rfx On Abnormal To Free T4    7. Encounter for long-term current use of medication  -     CBC & Differential    8. Post-menopausal  -     DEXA Bone Density Axial; Future    9. Polymyalgia rheumatica (HCC)  Assessment & Plan:  Recently seen by Dr. Moore.  Based on symptoms, I am unsure if this is the case.      10. Post-COVID  syndrome  Assessment & Plan:  The patient's inflammatory issues may be related to her post-COVID syndrome.  The patient had several courses of steroids however that did not significantly improve her symptoms of her lower extremity and hand burning and swelling.      11. Obesity (BMI 30-39.9)  Assessment & Plan:  Patient's diet has continued to improve, and her weight will likely continue to improve with it.  Patient is going to likely start easing into having some red meat and some carbohydrates, but going to mainly stick with the diet that she has been doing.      I reviewed an extensive amount of labs from 12/20/2022.  Dr. Moore had drawn a full panel of labs.  The most notable things was normal complement, abnormal DEXTER with speckled pattern.  Normal inflammatory markers.  Elevated anti-TPO.       Follow Up   Return in about 6 months (around 7/19/2023), or if symptoms worsen or fail to improve, for Recheck, Annual physical.  Patient was given instructions and counseling regarding her condition or for health maintenance advice. Please see specific information pulled into the AVS if appropriate.

## 2023-01-20 LAB
25(OH)D3+25(OH)D2 SERPL-MCNC: 99.5 NG/ML (ref 30–100)
ALBUMIN SERPL-MCNC: 4.5 G/DL (ref 3.5–5.2)
ALBUMIN/GLOB SERPL: 2 G/DL
ALP SERPL-CCNC: 113 U/L (ref 39–117)
ALT SERPL-CCNC: 21 U/L (ref 1–33)
AST SERPL-CCNC: 30 U/L (ref 1–32)
BASOPHILS # BLD AUTO: 0.02 10*3/MM3 (ref 0–0.2)
BASOPHILS NFR BLD AUTO: 0.5 % (ref 0–1.5)
BILIRUB SERPL-MCNC: 0.3 MG/DL (ref 0–1.2)
BUN SERPL-MCNC: 13 MG/DL (ref 8–23)
BUN/CREAT SERPL: 14 (ref 7–25)
CALCIUM SERPL-MCNC: 9.8 MG/DL (ref 8.6–10.5)
CHLORIDE SERPL-SCNC: 103 MMOL/L (ref 98–107)
CHOLEST SERPL-MCNC: 169 MG/DL (ref 0–200)
CO2 SERPL-SCNC: 27.9 MMOL/L (ref 22–29)
CREAT SERPL-MCNC: 0.93 MG/DL (ref 0.57–1)
EGFRCR SERPLBLD CKD-EPI 2021: 63.8 ML/MIN/1.73
EOSINOPHIL # BLD AUTO: 0.06 10*3/MM3 (ref 0–0.4)
EOSINOPHIL NFR BLD AUTO: 1.6 % (ref 0.3–6.2)
ERYTHROCYTE [DISTWIDTH] IN BLOOD BY AUTOMATED COUNT: 13 % (ref 12.3–15.4)
GLOBULIN SER CALC-MCNC: 2.3 GM/DL
GLUCOSE SERPL-MCNC: 89 MG/DL (ref 65–99)
HCT VFR BLD AUTO: 44.6 % (ref 34–46.6)
HDLC SERPL-MCNC: 57 MG/DL (ref 40–60)
HGB BLD-MCNC: 14.3 G/DL (ref 12–15.9)
IMM GRANULOCYTES # BLD AUTO: 0.02 10*3/MM3 (ref 0–0.05)
IMM GRANULOCYTES NFR BLD AUTO: 0.5 % (ref 0–0.5)
LDLC SERPL CALC-MCNC: 97 MG/DL (ref 0–100)
LYMPHOCYTES # BLD AUTO: 0.87 10*3/MM3 (ref 0.7–3.1)
LYMPHOCYTES NFR BLD AUTO: 22.7 % (ref 19.6–45.3)
MCH RBC QN AUTO: 30.7 PG (ref 26.6–33)
MCHC RBC AUTO-ENTMCNC: 32.1 G/DL (ref 31.5–35.7)
MCV RBC AUTO: 95.7 FL (ref 79–97)
MONOCYTES # BLD AUTO: 0.34 10*3/MM3 (ref 0.1–0.9)
MONOCYTES NFR BLD AUTO: 8.9 % (ref 5–12)
NEUTROPHILS # BLD AUTO: 2.53 10*3/MM3 (ref 1.7–7)
NEUTROPHILS NFR BLD AUTO: 65.8 % (ref 42.7–76)
NRBC BLD AUTO-RTO: 0 /100 WBC (ref 0–0.2)
PLATELET # BLD AUTO: 197 10*3/MM3 (ref 140–450)
POTASSIUM SERPL-SCNC: 4 MMOL/L (ref 3.5–5.2)
PROT SERPL-MCNC: 6.8 G/DL (ref 6–8.5)
RBC # BLD AUTO: 4.66 10*6/MM3 (ref 3.77–5.28)
SODIUM SERPL-SCNC: 142 MMOL/L (ref 136–145)
TRIGL SERPL-MCNC: 82 MG/DL (ref 0–150)
TSH SERPL DL<=0.005 MIU/L-ACNC: 1.55 UIU/ML (ref 0.27–4.2)
VLDLC SERPL CALC-MCNC: 15 MG/DL (ref 5–40)
WBC # BLD AUTO: 3.84 10*3/MM3 (ref 3.4–10.8)

## 2023-03-06 ENCOUNTER — TELEPHONE (OUTPATIENT)
Dept: INTERNAL MEDICINE | Facility: CLINIC | Age: 78
End: 2023-03-06
Payer: MEDICARE

## 2023-03-06 DIAGNOSIS — M54.12 CERVICAL RADICULITIS: Primary | ICD-10-CM

## 2023-03-06 DIAGNOSIS — F43.23 SITUATIONAL MIXED ANXIETY AND DEPRESSIVE DISORDER: ICD-10-CM

## 2023-03-06 RX ORDER — BUSPIRONE HYDROCHLORIDE 5 MG/1
5 TABLET ORAL 2 TIMES DAILY PRN
Qty: 30 TABLET | Refills: 0 | Status: SHIPPED | OUTPATIENT
Start: 2023-03-06

## 2023-03-06 NOTE — TELEPHONE ENCOUNTER
Back and neck injuries and cannot take pain meds so is wanting to get a prescription for Therapeutic Massage    She has been doing this for years but her masseuse does not want to deal with sales tax and forms so she needs a prescription

## 2023-03-23 NOTE — DISCHARGE INSTRUCTIONS
UPPER EXTREMITY POST-OP INSTRUCTIONS - DR. PRIETO    IMPORTANT PHONE NUMBERS:   For emergencies, please call [phone]   You may reach Dr. Prieto and clinical staff at [phone]- M-F 8:00 am-5:00 pm   After 5pm or on the weekends, please call [phone]   Call immediately if you have any of the following symptoms:     Elevated temperature above 101.5 degrees for more than 48 hours after surgery     Persistent drainage from wound     Severe pain around surgical site    Sling use: The sling is provided for your comfort and to ensure proper healing of your repair following surgery. Please place the abduction pillow with the curved side against your side and the sling on the side of the pillow. Your surgery requires that you wear the sling if noted below.  ____ For comfort. Remove sling 24 hours and begin range of motion exercises  __X__ At all times except bathing, dressing, and therapy. Also wear the sling during sleep.  ____ No sling required    Bathing:  ___No bandages, no restrictions!!  _X__You may remove you dressing and shower on the 3rd day after surgery (Ex. Tu surgery, shower on Friday)  ** if you are told to it is ok to remove your dressing and shower, DO NOT SOAK your incisions in a tub.  ___Keep splint clean, dry, and intact. DO NOT place foreign objects into your splint.      Dressings: Keep dressing/splint intact unless instructed otherwise below. SOME DRAINAGE IS NORMAL!     DO NOT touch or apply ointment to the incision.     DO NOT remove the steri-strips over the incisions (if you have steri-strips). They will         generally fall off on their own or can be removed 1 weeksafter surgery.     If you have yellow gauze and it comes off, do not worry about it. Leave them off.    Signs of infection that warrant a phone call to our clinical line:     o Excessive drainage or redness     o Red streaking coming away from the incision  o Increased pain  o Increased temperature above 101  degrees      Physical Therapy:        *  Your physical therapy status will be discussed with you postoperatively and at your first post-op appointment. Some injuries will not require physical therapy.      *  If you have a shoulder manipulation, please schedule therapy for the next day      Medications: You will be discharged with the appropriate medications following your surgery. Fill these at the pharmacy and take them as directed on the label. Not all of the medications below may be prescribed. Occasionally, other medications may be prescribed with specific instructions.    Percocet/Lortab (oxycodone/hydrocodone with tylenol) - Pain Medication, will cause drowsiness, possibly itchiness (this is NOT an allergy - use benadryl or an over the counter allergy medication such as Claritin or Zyrtec)     o Take 1-2 tablets every 4-6 hours. DO NOT EXCEED 4,000mg of Tylenol in 24 hours.  **DO NOT MIX WITH ALCOHOL, DRIVE WHILE TAKING, OR TAKE with extra TYLENOL**    Colace (Docusate) - stool softener, used for constipation. Take this only if you feel constipated.      Zofran (Ondansetron) or Phenergan - Anti-nausea medication, will cause drowsiness      *Starting January 2021, all narcotic medication must be prescribed electronically to your pharmacy.  Be sure to notify nursing of your preferred pharmacy.  If you are running low on pain medications, please notify us if you need a refill 24-48 hours prior to when you run out, so we can make arrangements to refill the prescription for you if we determine is necessary       What to expect after a Nerve Block  Nerve blocks administered to block pain affect many types of nerves, including those nerves that control movement, pain, and normal sensation.  Following a nerve block, you may notice some bruising at the site where the block was given.  You may experience sensations such as:  numbness of the affected area or limb, tingling, heaviness (that is the limb feels heavy to  you), weakness or inability to move the affected arm or leg, or a feeling as if your arm or leg has “fallen asleep”.    A nerve block can last from 9-18 hours depending on the medications used.  Certain medications can last up to 72 hours, your anesthesiologist may have discussed this with you pre-op. Usually the weakness wears off first followed by the tingling and heaviness.  As the block wears off, you may begin to notice pain; however, this sequence of events may occur in any order.  Typically, you will be able to move your limb before you will feel it.  Once a nerve block begins to wear off, the effects are usually completely gone within 60 minutes.    If you experience continued side effects that you believe are block related, please call your healthcare provider.  Please see block-specific instructions below.      Instructions for any block involving the shoulder or arm  If you have had any kind of shoulder/arm block, you will go home with your arm in a sling.  Wear the sling until the block has completely worn off or as directed by your doctor.  You may be required to wear it for a longer period of time per your surgeon’s recommendations.  I you have had a shoulder/arm block; it is a good idea to sleep on a recliner with pillows under your arm.    Note:  If you have severe or prolonged shortness of breath, please seek medical assistance as soon as possible.    Protection of a “blocked” arm (limb)  After a nerve block, you cannot feel pain, pressure, or extremes of temperature in the affected limb.  And because of this, your blocked limb is at more risk for injury.  For example, it is possible to burn your limb on an extremely hot surface without feeling it.  When resting, it is important to reposition your limb periodically to avoid prolonged pressure on it.  This may require the use of pillows and padding.  While sleeping, you should avoid rolling onto the affected limb or putting too much pressure on  it.  If you have a cast or tight dressing, check the color of your fingers of the affected limb.  Call your surgeon if they look discolored (that is, dusky, dark colored)  Use caution in cold weather.  Cover your limb appropriately to protect it from the cold.  Pain Management  Your surgeon will give you a prescription for pain medication.  Begin taking this before the nerve block wears off.  Bear in mind that sometimes the block can wear off in the middle of the night.

## 2023-03-24 ENCOUNTER — HOSPITAL ENCOUNTER (OUTPATIENT)
Dept: GENERAL RADIOLOGY | Facility: HOSPITAL | Age: 78
Discharge: HOME | End: 2023-03-24
Payer: MEDICARE

## 2023-03-24 ENCOUNTER — PRE-ADMISSION TESTING (OUTPATIENT)
Dept: PREADMISSION TESTING | Facility: HOSPITAL | Age: 78
End: 2023-03-24
Payer: MEDICARE

## 2023-03-24 VITALS
BODY MASS INDEX: 30.82 KG/M2 | WEIGHT: 184.97 LBS | DIASTOLIC BLOOD PRESSURE: 75 MMHG | OXYGEN SATURATION: 98 % | HEIGHT: 65 IN | HEART RATE: 72 BPM | RESPIRATION RATE: 18 BRPM | SYSTOLIC BLOOD PRESSURE: 130 MMHG

## 2023-03-24 PROBLEM — S42.292P: Status: ACTIVE | Noted: 2023-03-24

## 2023-03-24 LAB
ALBUMIN SERPL-MCNC: 4.6 G/DL (ref 3.5–5.2)
ALBUMIN/GLOB SERPL: 2 G/DL
ALP SERPL-CCNC: 111 U/L (ref 39–117)
ALT SERPL W P-5'-P-CCNC: 17 U/L (ref 1–33)
ANION GAP SERPL CALCULATED.3IONS-SCNC: 10 MMOL/L (ref 5–15)
APTT PPP: 31.9 SECONDS (ref 24.1–35)
AST SERPL-CCNC: 27 U/L (ref 1–32)
BASOPHILS # BLD AUTO: 0.02 10*3/MM3 (ref 0–0.2)
BASOPHILS NFR BLD AUTO: 0.6 % (ref 0–1.5)
BILIRUB SERPL-MCNC: 0.3 MG/DL (ref 0–1.2)
BILIRUB UR QL STRIP: NEGATIVE
BUN SERPL-MCNC: 21 MG/DL (ref 8–23)
BUN/CREAT SERPL: 25.6 (ref 7–25)
CALCIUM SPEC-SCNC: 9.1 MG/DL (ref 8.6–10.5)
CHLORIDE SERPL-SCNC: 102 MMOL/L (ref 98–107)
CLARITY UR: CLEAR
CO2 SERPL-SCNC: 28 MMOL/L (ref 22–29)
COLOR UR: YELLOW
CREAT SERPL-MCNC: 0.82 MG/DL (ref 0.57–1)
DEPRECATED RDW RBC AUTO: 42.9 FL (ref 37–54)
EGFRCR SERPLBLD CKD-EPI 2021: 74.2 ML/MIN/1.73
EOSINOPHIL # BLD AUTO: 0.04 10*3/MM3 (ref 0–0.4)
EOSINOPHIL NFR BLD AUTO: 1.2 % (ref 0.3–6.2)
ERYTHROCYTE [DISTWIDTH] IN BLOOD BY AUTOMATED COUNT: 12.5 % (ref 12.3–15.4)
GLOBULIN UR ELPH-MCNC: 2.3 GM/DL
GLUCOSE SERPL-MCNC: 100 MG/DL (ref 65–99)
GLUCOSE UR STRIP-MCNC: NEGATIVE MG/DL
HCT VFR BLD AUTO: 41.7 % (ref 34–46.6)
HGB BLD-MCNC: 13.7 G/DL (ref 12–15.9)
HGB UR QL STRIP.AUTO: NEGATIVE
IMM GRANULOCYTES # BLD AUTO: 0.01 10*3/MM3 (ref 0–0.05)
IMM GRANULOCYTES NFR BLD AUTO: 0.3 % (ref 0–0.5)
INR PPP: 1.06 (ref 0.91–1.09)
KETONES UR QL STRIP: ABNORMAL
LEUKOCYTE ESTERASE UR QL STRIP.AUTO: NEGATIVE
LYMPHOCYTES # BLD AUTO: 0.82 10*3/MM3 (ref 0.7–3.1)
LYMPHOCYTES NFR BLD AUTO: 25.5 % (ref 19.6–45.3)
MCH RBC QN AUTO: 31.1 PG (ref 26.6–33)
MCHC RBC AUTO-ENTMCNC: 32.9 G/DL (ref 31.5–35.7)
MCV RBC AUTO: 94.6 FL (ref 79–97)
MONOCYTES # BLD AUTO: 0.32 10*3/MM3 (ref 0.1–0.9)
MONOCYTES NFR BLD AUTO: 9.9 % (ref 5–12)
NEUTROPHILS NFR BLD AUTO: 2.01 10*3/MM3 (ref 1.7–7)
NEUTROPHILS NFR BLD AUTO: 62.5 % (ref 42.7–76)
NITRITE UR QL STRIP: NEGATIVE
NRBC BLD AUTO-RTO: 0 /100 WBC (ref 0–0.2)
PH UR STRIP.AUTO: 5.5 [PH] (ref 5–8)
PLATELET # BLD AUTO: 176 10*3/MM3 (ref 140–450)
PMV BLD AUTO: 11.5 FL (ref 6–12)
POTASSIUM SERPL-SCNC: 3.7 MMOL/L (ref 3.5–5.2)
PROT SERPL-MCNC: 6.9 G/DL (ref 6–8.5)
PROT UR QL STRIP: NEGATIVE
PROTHROMBIN TIME: 13.9 SECONDS (ref 11.8–14.8)
RBC # BLD AUTO: 4.41 10*6/MM3 (ref 3.77–5.28)
SODIUM SERPL-SCNC: 140 MMOL/L (ref 136–145)
SP GR UR STRIP: 1.02 (ref 1–1.03)
UROBILINOGEN UR QL STRIP: ABNORMAL
WBC NRBC COR # BLD: 3.22 10*3/MM3 (ref 3.4–10.8)

## 2023-03-24 PROCEDURE — 36415 COLL VENOUS BLD VENIPUNCTURE: CPT

## 2023-03-24 PROCEDURE — 93005 ELECTROCARDIOGRAM TRACING: CPT

## 2023-03-24 PROCEDURE — 85610 PROTHROMBIN TIME: CPT

## 2023-03-24 PROCEDURE — 85025 COMPLETE CBC W/AUTO DIFF WBC: CPT

## 2023-03-24 PROCEDURE — 71045 X-RAY EXAM CHEST 1 VIEW: CPT

## 2023-03-24 PROCEDURE — 85730 THROMBOPLASTIN TIME PARTIAL: CPT

## 2023-03-24 PROCEDURE — 81003 URINALYSIS AUTO W/O SCOPE: CPT

## 2023-03-24 PROCEDURE — 93010 ELECTROCARDIOGRAM REPORT: CPT | Performed by: HOSPITALIST

## 2023-03-24 PROCEDURE — 80053 COMPREHEN METABOLIC PANEL: CPT

## 2023-03-24 NOTE — DISCHARGE INSTRUCTIONS
Before you come to the hospital        Arrival time: AS DIRECTED BY OFFICE     YOU MAY TAKE THE FOLLOWING MEDICATION(S) THE MORNING OF SURGERY WITH A SIP OF WATER: ***  BUSPAR            ALL OTHER HOME MEDICATION CHECK WITH YOUR PHYSICIAN (especially if   you are taking diabetes medicines or blood thinners)      If you were given and instructed to use a germ- killing soap, use as directed the night before surgery and again the morning of surgery or as directed by your surgeon. (Use one-half of the bottle with each shower.)   See attached information for How to Use Chlorhexidine for Bathing if applicable.            Eating and drinking restrictions prior to scheduled arrival time    2 Hours before arrival time STOP   Drinking Clear liquids (water, apple juice-no pulp)     6 Hours before arrival time STOP   Milk or drinks that contain milk, full liquids    6 Hours before arrival time STOP   Light meals or foods, such as toast or cereal    8 Hours before arrival time STOP   Heavy foods, such as meat, fried foods, or fatty foods    (It is extremely important that you follow these guidelines to prevent delay or cancelation of your procedure)     Clear Liquids  Water and flavored water                                                                      Clear Fruit juices, such as cranberry juice and apple juice.  Black coffee (NO cream of any kind, including powdered).  Plain tea  Clear bouillon or broth.  Flavored gelatin.  Soda.  Gatorade or Powerade.  Full liquid examples  Juices that have pulp.  Frozen ice pops that contain fruit pieces.  Coffee with creamer  Milk.  Yogurt.                MANAGING PAIN AFTER SURGERY    We know you are probably wondering what your pain will be like after surgery.  Following surgery it is unrealistic to expect you will not have pain.   Pain is how our bodies let us know that something is wrong or cautions us to be careful.  That said, our goal is to make your pain tolerable.    Methods  we may use to treat your pain include (oral or IV medications, PCAs, epidurals, nerve blocks, etc.)   While some procedures require IV pain medications for a short time after surgery, transitioning to pain medications by mouth allows for better management of pain.   Your nurse will encourage you to take oral pain medications whenever possible.  IV medications work almost immediately, but only last a short while.  Taking medications by mouth allows for a more constant level of medication in your blood stream for a longer period of time.      Once your pain is out of control it is harder to get back under control.  It is important you are aware when your next dose of pain medication is due.  If you are admitted, your nurse may write the time of your next dose on the white board in your room to help you remember.      We are interested in your pain and encourage you to inform us about aggravating factors during your visit.   Many times a simple repositioning every few hours can make a big difference.    If your physician says it is okay, do not let your pain prevent you from getting out of bed. Be sure to call your nurse for assistance prior to getting up so you do not fall.      Before surgery, please decide your tolerable pain goal.  These faces help describe the pain ratings we use on a 0-10 scale.   Be prepared to tell us your goal and whether or not you take pain or anxiety medications at home.          Preparing for Surgery  Preparing for surgery is an important part of your care. It can make things go more smoothly and help you avoid complications. The steps leading up to surgery may vary among hospitals. Follow all instructions given to you by your health care providers. Ask questions if you do not understand something. Talk about any concerns that you have.  Here are some questions to consider asking before your surgery:  If my surgery is not an emergency (is elective), when would be the best time to have the  surgery?  What arrangements do I need to make for work, home, or school?  What will my recovery be like? How long will it be before I can return to normal activities?  Will I need to prepare my home? Will I need to arrange care for me or my children?  Should I expect to have pain after surgery? What are my pain management options? Are there nonmedical options that I can try for pain?  Tell a health care provider about:  Any allergies you have.  All medicines you are taking, including vitamins, herbs, eye drops, creams, and over-the-counter medicines.  Any problems you or family members have had with anesthetic medicines.  Any blood disorders you have.  Any surgeries you have had.  Any medical conditions you have.  Whether you are pregnant or may be pregnant.  What are the risks?  The risks and complications of surgery depend on the specific procedure that you have. Discuss all the risks with your health care providers before your surgery. Ask about common surgical complications, which may include:  Infection.  Bleeding or a need for blood replacement (transfusion).  Allergic reactions to medicines.  Damage to surrounding nerves, tissues, or structures.  A blood clot.  Scarring.  Failure of the surgery to correct the problem.  Follow these instructions before the procedure:  Several days or weeks before your procedure  You may have a physical exam by your primary health care provider to make sure it is safe for you to have surgery.  You may have testing. This may include a chest X-ray, blood and urine tests, electrocardiogram (ECG), or other testing.  Ask your health care provider about:  Changing or stopping your regular medicines. This is especially important if you are taking diabetes medicines or blood thinners.  Taking medicines such as aspirin and ibuprofen. These medicines can thin your blood. Do not take these medicines unless your health care provider tells you to take them.  Taking over-the-counter  medicines, vitamins, herbs, and supplements.  Do not use any products that contain nicotine or tobacco, such as cigarettes and e-cigarettes. If you need help quitting, ask your health care provider.  Avoid alcohol.  Ask your health care provider if there are exercises you can do to prepare for surgery.  Eat a healthy diet.   Plan to have someone 18 years of age or older to take you home from the hospital. We will need to verify your ride on the morning of surgery if you are being discharged home on the same day. Tell your ride to be expecting a call from the hospital prior to your procedure.   Plan to have a responsible adult care for you for at least 24 hours after you leave the hospital or clinic. This is important.  The day before your procedure  You may be given antibiotic medicine to take by mouth to help prevent infection. Take it as told by your health care provider.  You may be asked to shower with a germ-killing soap.  Follow instructions from your health care provider about eating and drinking restrictions. This includes gum, mints and hard candy.  Pack comfortable clothes according to your procedure.   The day of your procedure  You may need to take another shower with a germ-killing soap before you leave home in the morning.  With a small sip of water, take only the medicines that you are told to take.  Remove all jewelry including rings.   Leave anything you consider valuable at home except hearing aids if needed.  You do not need to bring your home medications into the hospital.   Do not wear any makeup, nail polish, powder, deodorant, lotion, hair accessories, or anything on your skin or body except your clothes.  If you will be staying in the hospital, bring a case to hold your glasses, contacts, or dentures. You may also want to bring your robe and non-skid footwear.       (Do not use denture adhesives since you will be asked to remove them during  surgery).   If you wear oxygen at home, bring it  with you the day of surgery.  If instructed by your health care provider, bring your sleep apnea device with you on the day of your surgery (if this applies to you).  You may want to leave your suitcase and sleep apnea device in the car until after surgery.   Arrive at the hospital as scheduled.  Bring a friend or family member with you who can help to answer questions and be present while you meet with your health care provider.  At the hospital  When you arrive at the hospital:  Go to registration located at the main entrance of the hospital. You will be registered and given a beeper and a sticker sheet. Take the stickers to the Outpatient nurses desk and place in the black tray. This is to notify staff that you have arrived. Then return to the lobby to wait.   When your beeper lights up and vibrates proceed through the double doors, under the stairs, and a member of the Outpatient Surgery staff will escort you to your preoperative room.  You may have to wear compression sleeves. These help to prevent blood clots and reduce swelling in your legs.  An IV may be inserted into one of your veins.              In the operating room, you may be given one or more of the following:        A medicine to help you relax (sedative).        A medicine to numb the area (local anesthetic).        A medicine to make you fall asleep (general anesthetic).        A medicine that is injected into an area of your body to numb everything below the                      injection site (regional anesthetic).  You may be given an antibiotic through your IV to help prevent infection.  Your surgical site will be marked or identified.    Contact a health care provider if you:  Develop a fever of more than 100.4°F (38°C) or other feelings of illness during the 48 hours before your surgery.  Have symptoms that get worse.  Have questions or concerns about your surgery.  Summary  Preparing for surgery can make the procedure go more smoothly and  lower your risk of complications.  Before surgery, make a list of questions and concerns to discuss with your surgeon. Ask about the risks and possible complications.  In the days or weeks before your surgery, follow all instructions from your health care provider. You may need to stop smoking, avoid alcohol, follow eating restrictions, and change or stop your regular medicines.  Contact your surgeon if you develop a fever or other signs of illness during the few days before your surgery.  This information is not intended to replace advice given to you by your health care provider. Make sure you discuss any questions you have with your health care provider.  Document Revised: 12/21/2018 Document Reviewed: 10/23/2018  Elsevier Patient Education © 2021 [employer]

## 2023-03-24 NOTE — OP NOTE
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 female status post a traumatic injury to the shoulder 5-6 years ago who has developed a painful malunion of the fracture site over time.  She has been treating with multple injections over the years but her function is quite poor.  Xrays of the shoulder showed a severely displaced and comminuted proximal humerus fracture.  The surgical indications were to relieve pain, improve function, and prevent future disability.  Risks included, but were not limited to, that of anesthesia, bleeding, infection, pain, damage to local structures, need for further surgery, failure of repair, stiffness, failure of implants, and loss of function.      ESTIMATED BLOOD LOSS: 200 mL    DRAINS: none     COMPLICATIONS: none    SPECIMENS: none    FINDINGS: see op note    PROCEDURE in DETAIL:  The patient was seen in the preoperative holding room, once again the informed consent was reviewed with the patient and signed.  The site of surgery was marked with the patient's agreement.  After being transported to the operating room, a timeout was performed identifying the correct patient as well as the operative site.  Dose appropriate IV antibiotics were given prior to incision.  The  "patient was positioned in the beach chair position, all bony prominences were protected and a sterile prep and drape was performed.  The surgical site was draped with Ioban dressing.    A deltopectoral approach to the shoulder joint was utilized as soft tissue was dissected down the level of the cephalic vein which was taken laterally along with the deltoid.  The biceps tendon was located, tenodesed to the superior border of the pectoralis major tendon insertion.  The rotator interval was opened noting the severe deformity of the proximal humerus.  A tagging stitch was placed in the subscapularis and with progressive external rotation of the shoulder, the tendon and underlying capsule were peeled from the less tuberosity fragment.  The axillary nerve was palpated and protected, verified with a \"tug test.\"      Strategic retractors were placed surround the glenoid, the axillary nerve was protected, and a complete capsulectomy was performed.  The labrum was excised.  A guidepin was placed in the inferior-central aspect of the glenoid, following by a reaming device, and drilling of the central peg hole.  A baseplate was impacted and central, superior, and inferior locking screws were placed.  The glenosphere was then impacted without complication.    Attention was turned back to the humeral side where reamers were inserted into the humeral canal.  The humeral canal was irrigated and dried thoroughly followed by insertion of progressively sized broaches until a stable fit.  The stem was inserted at 20 degrees of retroversion approximately 5 cm proximal to the pec major insertion.  Trial polyethylenes were placed until range of motion and stability were adequate.  The conjoint tendon showed increased tension. With traction of the shoulder, the entire scapula was translating without dissociation of the polyethylene.    The final implants were placed and the shoulder was once again reduced showing excellent stability and " range of motion.    The incision was thoroughly irrigated, followed by closure in layers.  The skin was closed with adhesive glue.  A sterile dressing and sling were placed.  Counts were correct.    The patient was awakened by anesthesia, transported to the recovery room in stable condition.    POSTOPERATIVE PLAN:  1) Discharge home once pain is controlled  2) Reverse total shoulder protocol    Electronically signed by Roger Akhtar MD on 3/28/2023 at 19:13 CDT

## 2023-03-26 LAB
QT INTERVAL: 420 MS
QTC INTERVAL: 426 MS

## 2023-03-28 ENCOUNTER — APPOINTMENT (OUTPATIENT)
Dept: GENERAL RADIOLOGY | Facility: HOSPITAL | Age: 78
End: 2023-03-28
Payer: MEDICARE

## 2023-03-28 ENCOUNTER — HOSPITAL ENCOUNTER (OUTPATIENT)
Facility: HOSPITAL | Age: 78
Setting detail: HOSPITAL OUTPATIENT SURGERY
Discharge: HOME | End: 2023-03-28
Attending: ORTHOPAEDIC SURGERY | Admitting: ORTHOPAEDIC SURGERY
Payer: MEDICARE

## 2023-03-28 ENCOUNTER — ANESTHESIA (OUTPATIENT)
Dept: PERIOP | Facility: HOSPITAL | Age: 78
End: 2023-03-28
Payer: MEDICARE

## 2023-03-28 ENCOUNTER — ANESTHESIA EVENT (OUTPATIENT)
Dept: PERIOP | Facility: HOSPITAL | Age: 78
End: 2023-03-28
Payer: MEDICARE

## 2023-03-28 VITALS
DIASTOLIC BLOOD PRESSURE: 61 MMHG | HEART RATE: 59 BPM | TEMPERATURE: 97.2 F | SYSTOLIC BLOOD PRESSURE: 112 MMHG | OXYGEN SATURATION: 92 % | RESPIRATION RATE: 16 BRPM

## 2023-03-28 LAB
ABO GROUP BLD: NORMAL
BLD GP AB SCN SERPL QL: NEGATIVE
RH BLD: POSITIVE
T&S EXPIRATION DATE: NORMAL

## 2023-03-28 PROCEDURE — 0 BUPIVACAINE LIPOSOME 1.3 % SUSPENSION: Performed by: ANESTHESIOLOGY

## 2023-03-28 PROCEDURE — 25010000002 CEFAZOLIN PER 500 MG: Performed by: ORTHOPAEDIC SURGERY

## 2023-03-28 PROCEDURE — 86901 BLOOD TYPING SEROLOGIC RH(D): CPT | Performed by: ORTHOPAEDIC SURGERY

## 2023-03-28 PROCEDURE — 25010000002 MIDAZOLAM PER 1 MG: Performed by: ANESTHESIOLOGY

## 2023-03-28 PROCEDURE — 73030 X-RAY EXAM OF SHOULDER: CPT

## 2023-03-28 PROCEDURE — 86900 BLOOD TYPING SEROLOGIC ABO: CPT | Performed by: ORTHOPAEDIC SURGERY

## 2023-03-28 PROCEDURE — C1776 JOINT DEVICE (IMPLANTABLE): HCPCS | Performed by: ORTHOPAEDIC SURGERY

## 2023-03-28 PROCEDURE — 86850 RBC ANTIBODY SCREEN: CPT | Performed by: ORTHOPAEDIC SURGERY

## 2023-03-28 PROCEDURE — C9290 INJ, BUPIVACAINE LIPOSOME: HCPCS | Performed by: ANESTHESIOLOGY

## 2023-03-28 PROCEDURE — 25010000002 DEXAMETHASONE PER 1 MG

## 2023-03-28 PROCEDURE — 25010000002 ONDANSETRON PER 1 MG

## 2023-03-28 PROCEDURE — 25010000002 PROPOFOL 10 MG/ML EMULSION

## 2023-03-28 PROCEDURE — 25010000002 FENTANYL CITRATE (PF) 50 MCG/ML SOLUTION: Performed by: ANESTHESIOLOGY

## 2023-03-28 PROCEDURE — 25010000002 DEXAMETHASONE PER 1 MG: Performed by: ANESTHESIOLOGY

## 2023-03-28 PROCEDURE — 25010000002 DROPERIDOL PER 5 MG: Performed by: ANESTHESIOLOGY

## 2023-03-28 RX ORDER — SUCCINYLCHOLINE/SOD CL,ISO/PF 200MG/10ML
SYRINGE (ML) INTRAVENOUS AS NEEDED
Status: DISCONTINUED | OUTPATIENT
Start: 2023-03-28 | End: 2023-03-28 | Stop reason: SURG

## 2023-03-28 RX ORDER — ONDANSETRON 2 MG/ML
4 INJECTION INTRAMUSCULAR; INTRAVENOUS ONCE AS NEEDED
Status: DISCONTINUED | OUTPATIENT
Start: 2023-03-28 | End: 2023-03-28 | Stop reason: HOSPADM

## 2023-03-28 RX ORDER — FAMOTIDINE 10 MG/ML
20 INJECTION, SOLUTION INTRAVENOUS
Status: COMPLETED | OUTPATIENT
Start: 2023-03-28 | End: 2023-03-28

## 2023-03-28 RX ORDER — SODIUM CHLORIDE, SODIUM LACTATE, POTASSIUM CHLORIDE, CALCIUM CHLORIDE 600; 310; 30; 20 MG/100ML; MG/100ML; MG/100ML; MG/100ML
1000 INJECTION, SOLUTION INTRAVENOUS CONTINUOUS
Status: DISCONTINUED | OUTPATIENT
Start: 2023-03-28 | End: 2023-03-28 | Stop reason: HOSPADM

## 2023-03-28 RX ORDER — FENTANYL CITRATE 50 UG/ML
25 INJECTION, SOLUTION INTRAMUSCULAR; INTRAVENOUS
Status: DISCONTINUED | OUTPATIENT
Start: 2023-03-28 | End: 2023-03-28 | Stop reason: HOSPADM

## 2023-03-28 RX ORDER — DROPERIDOL 2.5 MG/ML
0.62 INJECTION, SOLUTION INTRAMUSCULAR; INTRAVENOUS ONCE AS NEEDED
Status: COMPLETED | OUTPATIENT
Start: 2023-03-28 | End: 2023-03-28

## 2023-03-28 RX ORDER — LIDOCAINE HYDROCHLORIDE 10 MG/ML
0.5 INJECTION, SOLUTION EPIDURAL; INFILTRATION; INTRACAUDAL; PERINEURAL ONCE AS NEEDED
Status: DISCONTINUED | OUTPATIENT
Start: 2023-03-28 | End: 2023-03-28 | Stop reason: HOSPADM

## 2023-03-28 RX ORDER — FLUMAZENIL 0.1 MG/ML
0.2 INJECTION INTRAVENOUS AS NEEDED
Status: DISCONTINUED | OUTPATIENT
Start: 2023-03-28 | End: 2023-03-28 | Stop reason: HOSPADM

## 2023-03-28 RX ORDER — PROPOFOL 10 MG/ML
VIAL (ML) INTRAVENOUS AS NEEDED
Status: DISCONTINUED | OUTPATIENT
Start: 2023-03-28 | End: 2023-03-28 | Stop reason: SURG

## 2023-03-28 RX ORDER — MIDAZOLAM HYDROCHLORIDE 1 MG/ML
0.5 INJECTION INTRAMUSCULAR; INTRAVENOUS
Status: DISCONTINUED | OUTPATIENT
Start: 2023-03-28 | End: 2023-03-28 | Stop reason: HOSPADM

## 2023-03-28 RX ORDER — SODIUM CHLORIDE 0.9 % (FLUSH) 0.9 %
3 SYRINGE (ML) INJECTION AS NEEDED
Status: DISCONTINUED | OUTPATIENT
Start: 2023-03-28 | End: 2023-03-28 | Stop reason: HOSPADM

## 2023-03-28 RX ORDER — DEXAMETHASONE SODIUM PHOSPHATE 4 MG/ML
INJECTION, SOLUTION INTRA-ARTICULAR; INTRALESIONAL; INTRAMUSCULAR; INTRAVENOUS; SOFT TISSUE AS NEEDED
Status: DISCONTINUED | OUTPATIENT
Start: 2023-03-28 | End: 2023-03-28 | Stop reason: SURG

## 2023-03-28 RX ORDER — LIDOCAINE HYDROCHLORIDE 20 MG/ML
INJECTION, SOLUTION EPIDURAL; INFILTRATION; INTRACAUDAL; PERINEURAL AS NEEDED
Status: DISCONTINUED | OUTPATIENT
Start: 2023-03-28 | End: 2023-03-28 | Stop reason: SURG

## 2023-03-28 RX ORDER — IBUPROFEN 600 MG/1
600 TABLET ORAL ONCE AS NEEDED
Status: DISCONTINUED | OUTPATIENT
Start: 2023-03-28 | End: 2023-03-28 | Stop reason: HOSPADM

## 2023-03-28 RX ORDER — GLYCOPYRROLATE 0.2 MG/ML
INJECTION INTRAMUSCULAR; INTRAVENOUS AS NEEDED
Status: DISCONTINUED | OUTPATIENT
Start: 2023-03-28 | End: 2023-03-28 | Stop reason: SURG

## 2023-03-28 RX ORDER — OXYCODONE AND ACETAMINOPHEN 10; 325 MG/1; MG/1
1 TABLET ORAL ONCE AS NEEDED
Status: DISCONTINUED | OUTPATIENT
Start: 2023-03-28 | End: 2023-03-28 | Stop reason: HOSPADM

## 2023-03-28 RX ORDER — OXYCODONE AND ACETAMINOPHEN 7.5; 325 MG/1; MG/1
2 TABLET ORAL EVERY 4 HOURS PRN
Status: DISCONTINUED | OUTPATIENT
Start: 2023-03-28 | End: 2023-03-28 | Stop reason: HOSPADM

## 2023-03-28 RX ORDER — LABETALOL HYDROCHLORIDE 5 MG/ML
5 INJECTION, SOLUTION INTRAVENOUS
Status: DISCONTINUED | OUTPATIENT
Start: 2023-03-28 | End: 2023-03-28 | Stop reason: HOSPADM

## 2023-03-28 RX ORDER — BUPIVACAINE HYDROCHLORIDE 5 MG/ML
INJECTION, SOLUTION EPIDURAL; INTRACAUDAL
Status: COMPLETED | OUTPATIENT
Start: 2023-03-28 | End: 2023-03-28

## 2023-03-28 RX ORDER — ONDANSETRON 2 MG/ML
INJECTION INTRAMUSCULAR; INTRAVENOUS AS NEEDED
Status: DISCONTINUED | OUTPATIENT
Start: 2023-03-28 | End: 2023-03-28 | Stop reason: SURG

## 2023-03-28 RX ORDER — DEXAMETHASONE SODIUM PHOSPHATE 4 MG/ML
4 INJECTION, SOLUTION INTRA-ARTICULAR; INTRALESIONAL; INTRAMUSCULAR; INTRAVENOUS; SOFT TISSUE ONCE AS NEEDED
Status: COMPLETED | OUTPATIENT
Start: 2023-03-28 | End: 2023-03-28

## 2023-03-28 RX ORDER — MAGNESIUM HYDROXIDE 1200 MG/15ML
LIQUID ORAL AS NEEDED
Status: DISCONTINUED | OUTPATIENT
Start: 2023-03-28 | End: 2023-03-28 | Stop reason: HOSPADM

## 2023-03-28 RX ORDER — SODIUM CHLORIDE 9 MG/ML
40 INJECTION, SOLUTION INTRAVENOUS AS NEEDED
Status: DISCONTINUED | OUTPATIENT
Start: 2023-03-28 | End: 2023-03-28 | Stop reason: HOSPADM

## 2023-03-28 RX ORDER — ACETAMINOPHEN 500 MG
1000 TABLET ORAL ONCE
Status: COMPLETED | OUTPATIENT
Start: 2023-03-28 | End: 2023-03-28

## 2023-03-28 RX ORDER — ROCURONIUM BROMIDE 10 MG/ML
INJECTION, SOLUTION INTRAVENOUS AS NEEDED
Status: DISCONTINUED | OUTPATIENT
Start: 2023-03-28 | End: 2023-03-28 | Stop reason: SURG

## 2023-03-28 RX ORDER — NEOSTIGMINE METHYLSULFATE 5 MG/5 ML
SYRINGE (ML) INTRAVENOUS AS NEEDED
Status: DISCONTINUED | OUTPATIENT
Start: 2023-03-28 | End: 2023-03-28 | Stop reason: SURG

## 2023-03-28 RX ORDER — SODIUM CHLORIDE, SODIUM LACTATE, POTASSIUM CHLORIDE, CALCIUM CHLORIDE 600; 310; 30; 20 MG/100ML; MG/100ML; MG/100ML; MG/100ML
100 INJECTION, SOLUTION INTRAVENOUS CONTINUOUS
Status: DISCONTINUED | OUTPATIENT
Start: 2023-03-28 | End: 2023-03-28 | Stop reason: HOSPADM

## 2023-03-28 RX ORDER — FENTANYL CITRATE 50 UG/ML
50 INJECTION, SOLUTION INTRAMUSCULAR; INTRAVENOUS ONCE
Status: COMPLETED | OUTPATIENT
Start: 2023-03-28 | End: 2023-03-28

## 2023-03-28 RX ORDER — SODIUM CHLORIDE 0.9 % (FLUSH) 0.9 %
3-10 SYRINGE (ML) INJECTION AS NEEDED
Status: DISCONTINUED | OUTPATIENT
Start: 2023-03-28 | End: 2023-03-28 | Stop reason: HOSPADM

## 2023-03-28 RX ORDER — NALOXONE HCL 0.4 MG/ML
0.4 VIAL (ML) INJECTION AS NEEDED
Status: DISCONTINUED | OUTPATIENT
Start: 2023-03-28 | End: 2023-03-28 | Stop reason: HOSPADM

## 2023-03-28 RX ORDER — MIDAZOLAM HYDROCHLORIDE 1 MG/ML
0.5 INJECTION INTRAMUSCULAR; INTRAVENOUS ONCE
Status: COMPLETED | OUTPATIENT
Start: 2023-03-28 | End: 2023-03-28

## 2023-03-28 RX ORDER — SODIUM CHLORIDE 0.9 % (FLUSH) 0.9 %
3 SYRINGE (ML) INJECTION EVERY 12 HOURS SCHEDULED
Status: DISCONTINUED | OUTPATIENT
Start: 2023-03-28 | End: 2023-03-28 | Stop reason: HOSPADM

## 2023-03-28 RX ORDER — EPHEDRINE SULFATE 50 MG/ML
INJECTION INTRAVENOUS AS NEEDED
Status: DISCONTINUED | OUTPATIENT
Start: 2023-03-28 | End: 2023-03-28 | Stop reason: SURG

## 2023-03-28 RX ORDER — BUPIVACAINE HCL/0.9 % NACL/PF 0.1 %
2 PLASTIC BAG, INJECTION (ML) EPIDURAL ONCE
Status: COMPLETED | OUTPATIENT
Start: 2023-03-28 | End: 2023-03-28

## 2023-03-28 RX ADMIN — GLYCOPYRROLATE 0.4 MCG: 0.2 INJECTION INTRAMUSCULAR; INTRAVENOUS at 19:02

## 2023-03-28 RX ADMIN — FAMOTIDINE 20 MG: 10 INJECTION INTRAVENOUS at 16:14

## 2023-03-28 RX ADMIN — DROPERIDOL 0.62 MG: 2.5 INJECTION, SOLUTION INTRAMUSCULAR; INTRAVENOUS at 19:32

## 2023-03-28 RX ADMIN — ROCURONIUM BROMIDE 5 MG: 10 INJECTION, SOLUTION INTRAVENOUS at 17:58

## 2023-03-28 RX ADMIN — BUPIVACAINE 10 ML: 13.3 INJECTION, SUSPENSION, LIPOSOMAL INFILTRATION at 16:17

## 2023-03-28 RX ADMIN — DEXAMETHASONE SODIUM PHOSPHATE 4 MG: 4 INJECTION, SOLUTION INTRA-ARTICULAR; INTRALESIONAL; INTRAMUSCULAR; INTRAVENOUS; SOFT TISSUE at 18:14

## 2023-03-28 RX ADMIN — BUPIVACAINE HYDROCHLORIDE 10 ML: 5 INJECTION, SOLUTION EPIDURAL; INTRACAUDAL; PERINEURAL at 16:17

## 2023-03-28 RX ADMIN — EPHEDRINE SULFATE 10 MG: 50 INJECTION INTRAVENOUS at 18:35

## 2023-03-28 RX ADMIN — DEXAMETHASONE SODIUM PHOSPHATE 4 MG: 4 INJECTION, SOLUTION INTRAMUSCULAR; INTRAVENOUS at 16:15

## 2023-03-28 RX ADMIN — PROPOFOL INJECTABLE EMULSION 120 MG: 10 INJECTION, EMULSION INTRAVENOUS at 17:58

## 2023-03-28 RX ADMIN — ACETAMINOPHEN 1000 MG: 500 TABLET, FILM COATED ORAL at 16:14

## 2023-03-28 RX ADMIN — ROCURONIUM BROMIDE 35 MG: 10 INJECTION, SOLUTION INTRAVENOUS at 18:09

## 2023-03-28 RX ADMIN — Medication 2 G: at 18:02

## 2023-03-28 RX ADMIN — Medication 3 MG: at 19:02

## 2023-03-28 RX ADMIN — DROPERIDOL 0.62 MG: 2.5 INJECTION, SOLUTION INTRAMUSCULAR; INTRAVENOUS at 16:15

## 2023-03-28 RX ADMIN — LIDOCAINE HYDROCHLORIDE 60 MG: 20 INJECTION, SOLUTION EPIDURAL; INFILTRATION; INTRACAUDAL; PERINEURAL at 17:58

## 2023-03-28 RX ADMIN — Medication 120 MG: at 17:58

## 2023-03-28 RX ADMIN — ONDANSETRON 4 MG: 2 INJECTION INTRAMUSCULAR; INTRAVENOUS at 18:32

## 2023-03-28 RX ADMIN — Medication 1 MG: at 19:05

## 2023-03-28 RX ADMIN — MIDAZOLAM HYDROCHLORIDE 0.5 MG: 2 INJECTION, SOLUTION INTRAMUSCULAR; INTRAVENOUS at 16:15

## 2023-03-28 RX ADMIN — FENTANYL CITRATE 50 MCG: 50 INJECTION, SOLUTION INTRAMUSCULAR; INTRAVENOUS at 16:15

## 2023-03-28 RX ADMIN — SODIUM CHLORIDE, POTASSIUM CHLORIDE, SODIUM LACTATE AND CALCIUM CHLORIDE 1000 ML: 600; 310; 30; 20 INJECTION, SOLUTION INTRAVENOUS at 13:33

## 2023-03-28 NOTE — H&P
Pt Name: Thania Casiano  MRN: [id number]  YOB: 1945  Date of evaluation: 3/28/2023    H&P including current review of systems was updated in the paper chart and/or the document previously scanned into the record.  There have been no significant changes or new problems since the original evaluation.  The patient's problems continue and indications for contemplated procedure have not changed.    Electronically signed by Roger Akhtar MD on 3/28/2023 at 14:37 CDT

## 2023-03-28 NOTE — ANESTHESIA PREPROCEDURE EVALUATION
Anesthesia Evaluation     Patient summary reviewed and Nursing notes reviewed   history of anesthetic complications: PONV  NPO Solid Status: > 8 hours  NPO Liquid Status: > 8 hours           Airway   Mallampati: II  TM distance: >3 FB  Neck ROM: full  Dental    (+) upper dentures and partials    Pulmonary    (+) asthma,  Cardiovascular   Exercise tolerance: good (4-7 METS)    (+) hypertension,       Neuro/Psych  (+) psychiatric history Anxiety and Depression,    (-) seizures  GI/Hepatic/Renal/Endo    (+) obesity,  GERD, PUD,  thyroid problem hypothyroidism  (-) no renal disease, diabetes    Musculoskeletal     Abdominal    Substance History      OB/GYN          Other   arthritis,                        Anesthesia Plan    ASA 2     general with block     intravenous induction     Anesthetic plan, risks, benefits, and alternatives have been provided, discussed and informed consent has been obtained with: patient.

## 2023-03-28 NOTE — ANESTHESIA PROCEDURE NOTES
Airway  Urgency: elective    Date/Time: 3/28/2023 5:59 PM  Airway not difficult    General Information and Staff    Patient location during procedure: OR    Indications and Patient Condition  Indications for airway management: airway protection    Preoxygenated: yes  MILS maintained throughout      Final Airway Details  Final airway type: endotracheal airway      Successful airway: ETT  Cuffed: yes   Successful intubation technique: video laryngoscopy  Facilitating devices/methods: intubating stylet  Endotracheal tube insertion site: oral  Blade: Olson  Blade size: 3  ETT size (mm): 7.0  Cormack-Lehane Classification: grade I - full view of glottis  Placement verified by: chest auscultation and capnometry   Cuff volume (mL): 8  Number of attempts at approach: 1  Assessment: lips, teeth, and gum same as pre-op and atraumatic intubation

## 2023-03-29 NOTE — ANESTHESIA POSTPROCEDURE EVALUATION
Patient: Thania Casiano    Procedure Summary     Date: 03/28/23 Room / Location:  [facility] OR  /  [facility]    Anesthesia Start: 1752 Anesthesia Stop: 1915    Procedure: LEFT REVERSE TOTAL SHOULDER ARTHROPLASTY (Left: Shoulder) Diagnosis:       2-part disp fracture of surgical neck of left humerus with malunion      (S42.292P)    Surgeons: Roger Akhtar MD Provider: Jabier Evans CRNA    Anesthesia Type: general with block ASA Status: 2          Anesthesia Type: general with block    Vitals  Vitals Value Taken Time   /71 03/28/23 1930   Temp 97.2 °F (36.2 °C) 03/28/23 1912   Pulse 53 03/28/23 1933   Resp 17 03/28/23 1930   SpO2 96 % 03/28/23 1933   Vitals shown include unvalidated device data.        Post Anesthesia Care and Evaluation    Patient location during evaluation: PACU  Patient participation: complete - patient participated  Level of consciousness: awake and alert  Pain management: adequate    Airway patency: patent  Anesthetic complications: No anesthetic complications    Cardiovascular status: acceptable  Respiratory status: acceptable  Hydration status: acceptable    Comments: Blood pressure 115/68, pulse (!) 49, temperature 97.2 °F (36.2 °C), temperature source Temporal, resp. rate 16, SpO2 94 %, not currently breastfeeding.    Pt discharged from PACU based on kyung score >8

## 2023-03-29 NOTE — NURSING NOTE
Pt complaining of abdominal cramping and states she usually has a bowel movement every day but has not had one today. Upon unhooking pt from monitor pt starts to complain of nausea. PRN droperidol administered before transferring pt to outpatient. Once arriving in outpatient pt starts to complain that her abdomen is cramping worse. Pt states she feels as if she needs to have a bowel movement but does not want to get up to bathroom. Report given to KEERTHI Andrade at bedside.

## 2023-03-29 NOTE — BRIEF OP NOTE
TOTAL SHOULDER REVERSE ARTHROPLASTY  Progress Note    Thania Casiano  3/28/2023    Pre-op Diagnosis:   S42.292P       Post-Op Diagnosis Codes:     * 2-part disp fracture of surgical neck of left humerus with malunion [S42.222P]    Procedure/CPT® Codes:  WI ARTHROPLASTY GLENOHUMERAL JOINT TOTAL SHOULDER [80095]      Procedure(s):  LEFT REVERSE TOTAL SHOULDER ARTHROPLASTY        Surgeon(s):  Roger Akhtar MD    Anesthesia: General with Block    Staff:   Circulator: Herlinda Gilmore RN  Scrub Person: Jacob Vee Charles J         Estimated Blood Loss: <500ml    Urine Voided: * No values recorded between 3/28/2023  5:52 PM and 3/28/2023  7:11 PM *    Specimens:                None          Drains: * No LDAs found *    Findings: see op note         Complications: none          Roger Akhtar MD     Date: 3/28/2023  Time: 19:12 CDT

## 2023-04-12 DIAGNOSIS — J45.30 MILD PERSISTENT ASTHMA WITHOUT COMPLICATION: ICD-10-CM

## 2023-04-12 RX ORDER — THEOPHYLLINE 300 MG/1
TABLET, EXTENDED RELEASE ORAL
Qty: 180 TABLET | Refills: 3 | Status: SHIPPED | OUTPATIENT
Start: 2023-04-12

## 2023-04-12 NOTE — TELEPHONE ENCOUNTER
Pharmacy sent a request for refills on Theodur. Routed to Dr. Hughes.  Rx Refill Note  Requested Prescriptions     Pending Prescriptions Disp Refills   • theophylline (THEODUR) 300 MG 12 hr tablet [Pharmacy Med Name: THEOPHYLLINE ER TABS 300MG] 180 tablet 3     Sig: TAKE 1 TABLET TWICE A DAY      Last office visit with prescribing clinician: 9/23/2022   Last telemedicine visit with prescribing clinician: 9/26/2023   Next office visit with prescribing clinician: 9/26/2023                         Would you like a call back once the refill request has been completed: [] Yes [] No    If the office needs to give you a call back, can they leave a voicemail: [] Yes [] No    David Romano CMA  04/12/23, 08:26 CDT

## 2023-04-13 DIAGNOSIS — I10 BENIGN HYPERTENSION: ICD-10-CM

## 2023-04-13 RX ORDER — CLONIDINE HYDROCHLORIDE 0.1 MG/1
TABLET ORAL
Qty: 90 TABLET | Refills: 3 | Status: SHIPPED | OUTPATIENT
Start: 2023-04-13

## 2023-05-22 ENCOUNTER — OFFICE VISIT (OUTPATIENT)
Dept: INTERNAL MEDICINE | Facility: CLINIC | Age: 78
End: 2023-05-22
Payer: MEDICARE

## 2023-05-22 VITALS
TEMPERATURE: 98.8 F | BODY MASS INDEX: 31.16 KG/M2 | WEIGHT: 187 LBS | HEIGHT: 65 IN | DIASTOLIC BLOOD PRESSURE: 60 MMHG | HEART RATE: 98 BPM | SYSTOLIC BLOOD PRESSURE: 130 MMHG | OXYGEN SATURATION: 98 %

## 2023-05-22 DIAGNOSIS — R09.81 SINUS CONGESTION: ICD-10-CM

## 2023-05-22 DIAGNOSIS — F43.23 SITUATIONAL MIXED ANXIETY AND DEPRESSIVE DISORDER: ICD-10-CM

## 2023-05-22 DIAGNOSIS — J01.90 ACUTE BACTERIAL RHINOSINUSITIS: ICD-10-CM

## 2023-05-22 DIAGNOSIS — J06.9 ACUTE UPPER RESPIRATORY INFECTION: Primary | ICD-10-CM

## 2023-05-22 DIAGNOSIS — B96.89 ACUTE BACTERIAL RHINOSINUSITIS: ICD-10-CM

## 2023-05-22 DIAGNOSIS — J30.89 NON-SEASONAL ALLERGIC RHINITIS, UNSPECIFIED TRIGGER: ICD-10-CM

## 2023-05-22 DIAGNOSIS — J45.30 MILD PERSISTENT ASTHMA WITHOUT COMPLICATION: ICD-10-CM

## 2023-05-22 DIAGNOSIS — H40.89 OTHER SPECIFIED GLAUCOMA, UNSPECIFIED LATERALITY: ICD-10-CM

## 2023-05-22 DIAGNOSIS — I10 BENIGN HYPERTENSION: ICD-10-CM

## 2023-05-22 PROBLEM — R91.8 PULMONARY INFILTRATE: Status: RESOLVED | Noted: 2022-04-20 | Resolved: 2023-05-22

## 2023-05-22 PROBLEM — J45.909 ASTHMA: Status: RESOLVED | Noted: 2020-05-11 | Resolved: 2023-05-22

## 2023-05-22 PROBLEM — R10.12 LEFT UPPER QUADRANT ABDOMINAL PAIN: Status: RESOLVED | Noted: 2019-09-06 | Resolved: 2023-05-22

## 2023-05-22 PROBLEM — J45.20 MILD INTERMITTENT ASTHMA: Status: RESOLVED | Noted: 2018-12-10 | Resolved: 2023-05-22

## 2023-05-22 LAB
EXPIRATION DATE: NORMAL
FLUAV AG NPH QL: NEGATIVE
FLUBV AG NPH QL: NEGATIVE
INTERNAL CONTROL: NORMAL
Lab: NORMAL

## 2023-05-22 PROCEDURE — 1160F RVW MEDS BY RX/DR IN RCRD: CPT | Performed by: INTERNAL MEDICINE

## 2023-05-22 PROCEDURE — 87804 INFLUENZA ASSAY W/OPTIC: CPT | Performed by: INTERNAL MEDICINE

## 2023-05-22 PROCEDURE — 1159F MED LIST DOCD IN RCRD: CPT | Performed by: INTERNAL MEDICINE

## 2023-05-22 PROCEDURE — 3078F DIAST BP <80 MM HG: CPT | Performed by: INTERNAL MEDICINE

## 2023-05-22 PROCEDURE — 3075F SYST BP GE 130 - 139MM HG: CPT | Performed by: INTERNAL MEDICINE

## 2023-05-22 PROCEDURE — 99214 OFFICE O/P EST MOD 30 MIN: CPT | Performed by: INTERNAL MEDICINE

## 2023-05-22 RX ORDER — CEFDINIR 300 MG/1
300 CAPSULE ORAL 2 TIMES DAILY
Qty: 14 CAPSULE | Refills: 0 | Status: SHIPPED | OUTPATIENT
Start: 2023-05-22 | End: 2023-05-22 | Stop reason: SDUPTHER

## 2023-05-22 RX ORDER — LORATADINE 10 MG/1
10 TABLET ORAL DAILY
Qty: 90 TABLET | Refills: 3 | Status: SHIPPED | OUTPATIENT
Start: 2023-05-22

## 2023-05-22 RX ORDER — BUPROPION HYDROCHLORIDE 300 MG/1
300 TABLET ORAL DAILY
Qty: 90 TABLET | Refills: 3 | Status: SHIPPED | OUTPATIENT
Start: 2023-05-22

## 2023-05-22 RX ORDER — CEFDINIR 300 MG/1
300 CAPSULE ORAL 2 TIMES DAILY
Qty: 14 CAPSULE | Refills: 0 | Status: SHIPPED | OUTPATIENT
Start: 2023-05-22 | End: 2023-05-29

## 2023-05-22 NOTE — PROGRESS NOTES
"      Chief Complaint  Cough (Cough and congestion started 2 weeks ago ), Nasal Congestion, and Fever (Has not had temp until today )    Subjective        Thania Casiano presents to [facility]    HPI    Patient presents for the above problem.  The patient indicates that she has had issues since her surgery of congestion, and rhinorrhea.  Patient Geno that over the last 2 weeks it has progressively worsened.  She actually lost her voice at 1 point time.  She has had significant postnasal drainage as well as congestion.  Patient has also had some cough.  Patient developed a fever with a Tmax of over 102 °F.  This is the first time the patient's had a fever.  Patient initially thought it was just related to allergic rhinitis, and she does have a history of asthma.  Patient is unsure if she has had any sick contacts, but has been going to physical therapy for her arm.    Review of Systems    Objective   Vital Signs:  /60 (BP Location: Left arm, Patient Position: Sitting, Cuff Size: Adult)   Pulse 98   Temp 98.8 °F (37.1 °C) (Oral)   Ht 165.1 cm (65\")   Wt 84.8 kg (187 lb)   SpO2 98%   BMI 31.12 kg/m²   Estimated body mass index is 31.12 kg/m² as calculated from the following:    Height as of this encounter: 165.1 cm (65\").    Weight as of this encounter: 84.8 kg (187 lb).      Physical Exam  Vitals reviewed.   Constitutional:       Appearance: She is obese.   HENT:      Head: Normocephalic and atraumatic.      Nose:      Right Turbinates: Enlarged and swollen.      Left Turbinates: Enlarged and swollen.      Mouth/Throat:      Pharynx: Posterior oropharyngeal erythema present. No pharyngeal swelling or oropharyngeal exudate.   Eyes:      General: No scleral icterus.     Conjunctiva/sclera: Conjunctivae normal.   Cardiovascular:      Rate and Rhythm: Normal rate and regular rhythm.   Pulmonary:      Effort: Pulmonary effort is normal. No respiratory distress. "   Neurological:      General: No focal deficit present.      Mental Status: She is alert and oriented to person, place, and time.                   Assessment and Plan   Diagnoses and all orders for this visit:    1. Acute upper respiratory infection (Primary)  -     POC Influenza A / B  -     COVID-19,[facility] ROUTINE, NP/OP SWAB IN TRANSPORT MEDIA OR ESWAB 72 HR TAT - Swab, Nasopharynx  -     Discontinue: cefdinir (OMNICEF) 300 MG capsule; Take 1 capsule by mouth 2 (Two) Times a Day for 7 days.  Dispense: 14 capsule; Refill: 0  -     cefdinir (OMNICEF) 300 MG capsule; Take 1 capsule by mouth 2 (Two) Times a Day for 7 days.  Dispense: 14 capsule; Refill: 0    2. Situational mixed anxiety and depressive disorder  -     buPROPion XL (WELLBUTRIN XL) 300 MG 24 hr tablet; Take 1 tablet by mouth Daily.  Dispense: 90 tablet; Refill: 3    3. Sinus congestion  -     loratadine (Claritin) 10 MG tablet; Take 1 tablet by mouth Daily.  Dispense: 90 tablet; Refill: 3    4. Non-seasonal allergic rhinitis, unspecified trigger  -     loratadine (Claritin) 10 MG tablet; Take 1 tablet by mouth Daily.  Dispense: 90 tablet; Refill: 3    5. Acute bacterial rhinosinusitis    6. Other specified glaucoma, unspecified laterality    7. Benign hypertension    8. Mild persistent asthma without complication      The patient has a history of asthma as well as rhinosinusitis.  The patient has been taking her loratadine.  The patient has not taken any over-the-counter medications for this sinus congestion.  I worry that the patient has acute bacterial rhinosinusitis.  Organ to treat with the patient with 7 days of cefdinir.  Patient has no allergies to her knowledge.    Patient indicates that she has glaucoma.  Patient follows with ophthalmology.  Patient asked me if it was okay to continue taking her loratadine, as she read somewhere that it can cause issues with glaucoma.  I discussed with her that loratadine theoretically can cause pupillary  dilation, which can cause acute angle-closure glaucoma, but it is a rare occurrence.  It sounds as though her glaucoma is also very well controlled.    Patient's blood pressure is currently well controlled.  Discussed with her that she can likely take most over-the-counter cough cold medications as needed.  Some of the medications that are for hypertension may be a little bit better such as Coricidin.  However given that her blood pressure is well controlled, she likely can tolerate any other medication that she would like to do so.    Recommend Mucinex 1200 mg twice daily.    We will check a flu which was negative, and also check COVID-19.  The numbers of both viral illnesses have been drastically decreasing in recent months.    Result Review :                      Follow Up   Return if symptoms worsen or fail to improve, for Recheck, Next scheduled follow up.  Patient was given instructions and counseling regarding her condition or for health maintenance advice. Please see specific information pulled into the AVS if appropriate.       KIARA Tirado MD, FACP, FH      Electronically signed by Feliz Tirado MD, 05/22/23, 4:56 PM CDT.

## 2023-05-23 LAB — SARS-COV-2 RNA RESP QL NAA+PROBE: NOT DETECTED

## 2023-06-09 RX ORDER — LEVOTHYROXINE SODIUM 0.1 MG/1
TABLET ORAL
Qty: 90 TABLET | Refills: 3 | Status: SHIPPED | OUTPATIENT
Start: 2023-06-09

## 2023-06-12 DIAGNOSIS — Z12.31 SCREENING MAMMOGRAM, ENCOUNTER FOR: ICD-10-CM

## 2023-06-14 DIAGNOSIS — F43.23 SITUATIONAL MIXED ANXIETY AND DEPRESSIVE DISORDER: ICD-10-CM

## 2023-06-14 RX ORDER — CELECOXIB 200 MG/1
200 CAPSULE ORAL DAILY
Qty: 90 CAPSULE | Refills: 3 | Status: SHIPPED | OUTPATIENT
Start: 2023-06-14

## 2023-06-14 RX ORDER — BUSPIRONE HYDROCHLORIDE 5 MG/1
TABLET ORAL
Qty: 60 TABLET | Refills: 11 | Status: SHIPPED | OUTPATIENT
Start: 2023-06-14

## 2023-08-23 RX ORDER — SPIRONOLACTONE 25 MG/1
TABLET ORAL
Qty: 90 TABLET | Refills: 3 | Status: SHIPPED | OUTPATIENT
Start: 2023-08-23

## 2023-08-24 RX ORDER — TRAZODONE HYDROCHLORIDE 50 MG/1
TABLET ORAL
Qty: 90 TABLET | Refills: 3 | Status: SHIPPED | OUTPATIENT
Start: 2023-08-24

## 2023-09-26 ENCOUNTER — OFFICE VISIT (OUTPATIENT)
Dept: PULMONOLOGY | Facility: CLINIC | Age: 78
End: 2023-09-26
Payer: MEDICARE

## 2023-09-26 VITALS
OXYGEN SATURATION: 98 % | SYSTOLIC BLOOD PRESSURE: 118 MMHG | HEART RATE: 75 BPM | WEIGHT: 168 LBS | BODY MASS INDEX: 27.99 KG/M2 | DIASTOLIC BLOOD PRESSURE: 66 MMHG | HEIGHT: 65 IN

## 2023-09-26 DIAGNOSIS — J45.30 MILD PERSISTENT ASTHMA WITHOUT COMPLICATION: Primary | Chronic | ICD-10-CM

## 2023-09-26 DIAGNOSIS — E66.3 OVERWEIGHT: Chronic | ICD-10-CM

## 2023-09-26 DIAGNOSIS — R91.1 LUNG NODULE: Chronic | ICD-10-CM

## 2023-09-26 DIAGNOSIS — Z87.891 PERSONAL HISTORY OF NICOTINE DEPENDENCE: Chronic | ICD-10-CM

## 2023-09-26 DIAGNOSIS — U09.9 POST-COVID SYNDROME: Chronic | ICD-10-CM

## 2023-09-26 NOTE — ASSESSMENT & PLAN NOTE
She has a small left lower lobe nodule that has been stable for well over 2 years on serial imaging studies and will require no additional imaging studies based on her very remote smoking history.

## 2023-09-26 NOTE — ASSESSMENT & PLAN NOTE
Patient's (Body mass index is 27.96 kg/m².) indicates that they are overweight with health conditions that include GERD . Weight is unchanged.  Diet and exercise are encouraged and she will follow-up with her primary care physician regarding her elevated BMI otherwise.

## 2023-09-26 NOTE — ASSESSMENT & PLAN NOTE
She is having a lot of nonpulmonary symptoms at present particularly rheumatologic symptoms and is scheduled to see Dr. Moore tomorrow.

## 2023-09-26 NOTE — ASSESSMENT & PLAN NOTE
She will continue her Wixela and which she is now utilizing in place of Advair, her as needed albuterol HFA, and her theophylline preparation.

## 2023-09-26 NOTE — PROGRESS NOTES
Chief Complaint  Asthma and Post COVID syndrome.    Subjective    History of Present Illness {CC  Problem List  Visit Diagnosis   Encounters  Notes  Medications  Labs  Result Review Imaging  Media: 23}    Thania Casiano presents to [facility] for asthma and post-COVID syndrome.    History of Present Illness   The patient's major issue at present relates to post COVID issues with severe joint pain.  She states she had recovered Rayes well from COVID but had shoulder surgery this past spring and then that exacerbated a lot of inflammatory problems she has had over the years particularly in her joints.  This has become increasingly severe recently and she is scheduled for rheumatology evaluation by Dr. Moore tomorrow.  She has seen him in the past.  She is recently stable from a pulmonary standpoint.  I did tell her we can get follow-up PFTs and even consider chest CT but she prefers to hold off.  I told her we will just plan on a follow-up visit in 1 year and she will continue her current regimen from the standpoint of her asthma therapy.  She has had the COVID-19 vaccine including a booster in the form of the Moderna vaccine.  She had the flu shot this past fall as well.  Prior to Admission medications    Medication Sig Start Date End Date Taking? Authorizing Provider   acetaminophen (TYLENOL) 500 MG tablet Take 1 tablet by mouth 2 (Two) Times a Day.   Yes Provider, MD Malcolm   Advair Diskus 100-50 MCG/ACT DISKUS USE 1 INHALATION TWICE A DAY 1/17/23  Yes Natan Hughes MD   albuterol sulfate HFA (ProAir HFA) 108 (90 Base) MCG/ACT inhaler Inhale 2 puffs Every 4 (Four) Hours As Needed for Wheezing. 11/4/22  Yes Denise Quan APRN   buPROPion XL (WELLBUTRIN XL) 300 MG 24 hr tablet Take 1 tablet by mouth Daily. 5/22/23  Yes Feliz Tirado MD   busPIRone (BUSPAR) 5 MG tablet Take 1 tablet by mouth 2 (Two) Times a Day As  "Needed (anxiety). 23  Yes Dilcia Odonnell C, APRN   celecoxib (CeleBREX) 200 MG capsule Take 1 capsule by mouth Daily. 23  Yes Feliz Tirado MD   cloNIDine (CATAPRES) 0.1 MG tablet TAKE 1 TABLET EVERY NIGHT 23  Yes Belle [place] C, APRN   Ergocalciferol (VITAMIN D2 PO) Take 1,000 Units by mouth Daily.   Yes Malcolm Crowell MD   levothyroxine (SYNTHROID, LEVOTHROID) 100 MCG tablet TAKE 1 TABLET DAILY ON AN EMPTY STOMACH 23  Yes Feliz Tirado MD   loratadine (Claritin) 10 MG tablet Take 1 tablet by mouth Daily. 23  Yes Feliz Tirado MD   LUMIGAN 0.01 % ophthalmic drops  17  Yes Malcolm Crowell MD   multivitamin with minerals tablet tablet Take 1 tablet by mouth Daily.   Yes Malcolm Crowell MD   spironolactone (ALDACTONE) 25 MG tablet TAKE 1 TABLET DAILY 23  Yes Feliz Tirado MD   theophylline (THEODUR) 300 MG 12 hr tablet TAKE 1 TABLET TWICE A DAY 23  Yes Natan Hughes MD   traZODone (DESYREL) 50 MG tablet TAKE 1 TABLET EVERY NIGHT AT BEDTIME 23  Yes Feliz Tirado MD   Turmeric 500 MG capsule Take 1 capsule by mouth Daily.   Yes Malcolm Crowell MD       Social History     Socioeconomic History    Marital status:    Tobacco Use    Smoking status: Former     Packs/day: 1.00     Years: 25.00     Pack years: 25.00     Types: Cigarettes     Quit date:      Years since quittin.7    Smokeless tobacco: Never    Tobacco comments:     off and on    Vaping Use    Vaping Use: Never used   Substance and Sexual Activity    Alcohol use: Not Currently     Comment: occ wine    Drug use: No    Sexual activity: Not Currently     Partners: Male     Birth control/protection: Surgical       Objective   Vital Signs:   /66   Pulse 75   Ht 165.1 cm (65\")   Wt 76.2 kg (168 lb)   SpO2 98% Comment: RA  BMI 27.96 kg/m²     Physical Exam  Vitals and nursing note reviewed.   Constitutional:       Comments: Her " BMI is somewhat elevated.   HENT:      Head: Normocephalic.   Eyes:      Extraocular Movements: Extraocular movements intact.      Pupils: Pupils are equal, round, and reactive to light.   Cardiovascular:      Rate and Rhythm: Normal rate and regular rhythm.   Pulmonary:      Effort: Pulmonary effort is normal.      Breath sounds: Normal breath sounds.   Musculoskeletal:      Comments: She does have some decreased range of motion of the hands in particular.   Skin:     General: Skin is warm and dry.   Neurological:      General: No focal deficit present.      Mental Status: She is alert and oriented to person, place, and time.   Psychiatric:         Mood and Affect: Mood normal.         Behavior: Behavior normal.      Result Review :    PFT Values          2022    15:45   Pre Drug PFT Results   FVC 88   FEV1 88   FEF 25-75% 88   FEV1/FVC 77   Other Tests PFT Results         DLCO 133   D/VAsb 124         Results for orders placed in visit on 22    Full Pulmonary Function Test Without Bronchodilator    Narrative  Full Pulmonary Function Test Without Bronchodilator  Performed by: David Romano CMA  Authorized by: Natan Hughes MD    Pre Drug % Predicted  FVC: 88%  FEV1: 88%  FEF 25-75%: 88%  FEV1/FVC: 77%  T%  RV: 112%  DLCO: 133%  D/VAsb: 124%    Interpretation  Spirometry  Spirometry shows normal results.  Diffusion Capacity  The patient's diffusion capacity is normal.  Diffusion capacity is normal when corrected for alveolar volume.  Overall comments: The patient's spirometry is within normal limits.  Lung volumes are within normal limits with the exception of a decrease in expiratory reserve volume.  Diffusion capacity is within normal limits when current studies are compared to [facility] 10, 2018, there is no change in her FVC and a minimal but not significant drop in her FEV1 compared to previous.      Results for  orders placed in visit on 12/10/18    Pulmonary Function Test                 My interpretation of imaging:    CT Chest Without Contrast Diagnostic (06/24/2022 08:55)   CT Angiogram Chest (12/23/2021 16:01)   CT Abdomen Pelvis With Contrast (08/19/2019 13:34)   Assessment and Plan {CC Problem List  Visit Diagnosis  ROS  Review (Popup)  Health Maintenance  Quality  BestPractice  Medications  SmartSets  SnapShot Encounters  Media : 23}    Diagnoses and all orders for this visit:    1. Mild persistent asthma without complication (Primary)  Assessment & Plan:  She will continue her Wixela and which she is now utilizing in place of Advair, her as needed albuterol HFA, and her theophylline preparation.          2. Post-COVID syndrome  Assessment & Plan:  She is having a lot of nonpulmonary symptoms at present particularly rheumatologic symptoms and is scheduled to see Dr. Moore tomorrow.      3. Lung nodule  Assessment & Plan:  She has a small left lower lobe nodule that has been stable for well over 2 years on serial imaging studies and will require no additional imaging studies based on her very remote smoking history.      4. Personal history of nicotine dependence  Assessment & Plan:  She has not smoked since 1980.      5. Overweight  Assessment & Plan:  Patient's (Body mass index is 27.96 kg/m².) indicates that they are overweight with health conditions that include GERD . Weight is unchanged.  Diet and exercise are encouraged and she will follow-up with her primary care physician regarding her elevated BMI otherwise.            Natan Hughes MD  9/26/2023  14:47 CDT    Follow Up   Return in about 1 year (around 9/26/2024) for To see me specifically.    Patient was given instructions and counseling regarding her condition or for health maintenance advice. Please see specific information pulled into the AVS if appropriate.

## 2023-09-26 NOTE — PATIENT INSTRUCTIONS
The patient's had a lot of issues with post COVID problems including diffuse joint pain which was triggered by shoulder surgery this past spring.  She is scheduled to see Dr. Moore tomorrow.  She will continue her current regimen from a pulmonary standpoint and I will see her back in 1 year.

## 2023-12-08 ENCOUNTER — CLINICAL SUPPORT (OUTPATIENT)
Dept: INTERNAL MEDICINE | Facility: CLINIC | Age: 78
End: 2023-12-08
Payer: MEDICARE

## 2023-12-08 DIAGNOSIS — Z23 FLU VACCINE NEED: Primary | ICD-10-CM

## 2023-12-08 NOTE — PROGRESS NOTES
Patient here today for flu vaccine given in the right deltoid.  Patient tolerated well and left the clinic.

## 2023-12-11 ENCOUNTER — TELEPHONE (OUTPATIENT)
Dept: INTERNAL MEDICINE | Facility: CLINIC | Age: 78
End: 2023-12-11

## 2023-12-11 NOTE — TELEPHONE ENCOUNTER
Caller: Thania Casiano     Relationship to patient: SELF     Best call back number: [phone]     Chief complaint: SHE STATES SHE IS HAVING TROUBLE BREATHING EVEN AT REST     Patient directed to call 911 or go to their nearest emergency room.     Patient verbalized understanding: [x] Yes  [] No  If no, why?    Additional notes: NONE

## 2024-01-15 ENCOUNTER — OFFICE VISIT (OUTPATIENT)
Dept: INTERNAL MEDICINE | Facility: CLINIC | Age: 79
End: 2024-01-15
Payer: MEDICARE

## 2024-01-15 ENCOUNTER — TELEPHONE (OUTPATIENT)
Dept: INTERNAL MEDICINE | Facility: CLINIC | Age: 79
End: 2024-01-15

## 2024-01-15 VITALS
HEIGHT: 65 IN | OXYGEN SATURATION: 98 % | SYSTOLIC BLOOD PRESSURE: 122 MMHG | HEART RATE: 84 BPM | TEMPERATURE: 98.4 F | RESPIRATION RATE: 18 BRPM | WEIGHT: 154 LBS | DIASTOLIC BLOOD PRESSURE: 72 MMHG | BODY MASS INDEX: 25.66 KG/M2

## 2024-01-15 DIAGNOSIS — J01.40 ACUTE NON-RECURRENT PANSINUSITIS: Primary | ICD-10-CM

## 2024-01-15 DIAGNOSIS — J02.9 SORE THROAT: ICD-10-CM

## 2024-01-15 DIAGNOSIS — R06.7 SNEEZING: ICD-10-CM

## 2024-01-15 DIAGNOSIS — R51.9 NONINTRACTABLE HEADACHE, UNSPECIFIED CHRONICITY PATTERN, UNSPECIFIED HEADACHE TYPE: ICD-10-CM

## 2024-01-15 DIAGNOSIS — R52 BODY ACHES: ICD-10-CM

## 2024-01-15 LAB
EXPIRATION DATE: NORMAL
EXPIRATION DATE: NORMAL
FLUAV AG NPH QL: NEGATIVE
FLUBV AG NPH QL: NEGATIVE
INTERNAL CONTROL: NORMAL
INTERNAL CONTROL: NORMAL
Lab: NORMAL
Lab: NORMAL
SARS-COV-2 AG UPPER RESP QL IA.RAPID: NORMAL

## 2024-01-15 PROCEDURE — 87804 INFLUENZA ASSAY W/OPTIC: CPT

## 2024-01-15 PROCEDURE — 99213 OFFICE O/P EST LOW 20 MIN: CPT

## 2024-01-15 PROCEDURE — 1159F MED LIST DOCD IN RCRD: CPT

## 2024-01-15 PROCEDURE — 1160F RVW MEDS BY RX/DR IN RCRD: CPT

## 2024-01-15 PROCEDURE — 3078F DIAST BP <80 MM HG: CPT

## 2024-01-15 PROCEDURE — 3074F SYST BP LT 130 MM HG: CPT

## 2024-01-15 PROCEDURE — 87426 SARSCOV CORONAVIRUS AG IA: CPT

## 2024-01-15 RX ORDER — METHYLPREDNISOLONE 4 MG/1
TABLET ORAL
Qty: 1 TABLET | Refills: 0 | Status: SHIPPED | OUTPATIENT
Start: 2024-01-15

## 2024-01-15 RX ORDER — CEFDINIR 300 MG/1
300 CAPSULE ORAL 2 TIMES DAILY
Qty: 6 CAPSULE | Refills: 0 | Status: SHIPPED | OUTPATIENT
Start: 2024-01-15 | End: 2024-01-18

## 2024-01-15 RX ORDER — GUAIFENESIN 600 MG/1
1200 TABLET, EXTENDED RELEASE ORAL 2 TIMES DAILY
Qty: 28 TABLET | Refills: 0 | Status: SHIPPED | OUTPATIENT
Start: 2024-01-15 | End: 2024-01-22

## 2024-01-15 NOTE — PROGRESS NOTES
Subjective   Thania Casiano is a 78 y.o. female.   Chief Complaint   Patient presents with    URI     X3 days with sore throat, full ears, sneezing and feeling fatigued.  Pt's  was in the office last week.       History of Present Illness   Mrs. Casiano presents the office today with concerns of respiratory infection  She explains that it is symptoms began with a scratchy throat on Saturday evening, severe sore throat on Sunday, now her sore throat feels better just a little sore but she has sinus congestion/pressure, ear fullness, some bodyaches.  No fevers, chills.  She denies any shortness of breath, wheezing, reports maintaining on her current maintenance inhalers.  She has not tried any over-the-counter therapies except for the extra strength Tylenol that she typically takes.  She does report some fatigue.  Also reports her  was sick prior to her.  The following portions of the patient's history were reviewed and updated as appropriate: allergies, current medications, past family history, past medical history, past social history, past surgical history and problem list.    Review of Systems    Objective   Past Medical History:   Diagnosis Date    Anxiety     Arthritis     Asthma     COVID-19 05/2022    POST COMPLICATIONS FROM COVID 19 2022    Depression     GERD (gastroesophageal reflux disease)     HAS SUBSIDED FOR NOW    Glaucoma     H. pylori infection     Hypertension     Increased intraocular pressure     Migraine     Peptic ulcer     Polymyalgia     Polymyalgia rheumatica     PONV (postoperative nausea and vomiting)     Thyroid disease       Past Surgical History:   Procedure Laterality Date    APPENDECTOMY      BACK SURGERY      BREAST BIOPSY      CARPAL TUNNEL RELEASE Bilateral     CATARACT EXTRACTION Bilateral     CERVICAL SPINAL CORD TUMOR REMOVAL Right 01/26/2018    Procedure: CERVICAL SPINAL TUMOR REMOVAL RIGHT;  Surgeon: Yoav Palencia MD;  Location: [facility];  Service:      CHOLECYSTECTOMY      COLONOSCOPY  08/27/2015    COLONOSCOPY N/A 09/30/2019    Procedure: COLONOSCOPY WITH ANESTHESIA;  Surgeon: Bryan Warner MD;  Location: [facility];  Service: Gastroenterology    ENDOSCOPY  08/24/2010    ENDOSCOPY N/A 09/30/2019    Procedure: ESOPHAGOGASTRODUODENOSCOPY WITH ANESTHESIA;  Surgeon: Bryan Warner MD;  Location: [facility] END[facility];  Service: Gastroenterology    ENDOSCOPY N/A 04/09/2021    Procedure: ESOPHAGOGASTRODUODENOSCOPY WITH ANESTHESIA;  Surgeon: Bryan Warner MD;  Location: [facility] ENDOS[facility];  Service: Gastroenterology;  Laterality: N/A;  preop; nausea  postop: small bowel diverticulum,  PCP Davon Ashley     GALLBLADDER SURGERY      HYSTERECTOMY      TONSILLECTOMY      TOTAL SHOULDER ARTHROPLASTY W/ DISTAL CLAVICLE EXCISION Left 3/28/2023    Procedure: LEFT REVERSE TOTAL SHOULDER ARTHROPLASTY;  Surgeon: Roger Akhtar MD;  Location: [facility] OR;  Service: Orthopedics;  Laterality: Left;        Current Outpatient Medications:     acetaminophen (TYLENOL) 500 MG tablet, Take 1 tablet by mouth 2 (Two) Times a Day., Disp: , Rfl:     Advair Diskus 100-50 MCG/ACT DISKUS, USE 1 INHALATION TWICE A DAY, Disp: 180 each, Rfl: 3    albuterol sulfate HFA (ProAir HFA) 108 (90 Base) MCG/ACT inhaler, Inhale 2 puffs Every 4 (Four) Hours As Needed for Wheezing., Disp: 25.5 g, Rfl: 3    buPROPion XL (WELLBUTRIN XL) 300 MG 24 hr tablet, Take 1 tablet by mouth Daily., Disp: 90 tablet, Rfl: 3    busPIRone (BUSPAR) 5 MG tablet, Take 1 tablet by mouth 2 (Two) Times a Day As Needed (anxiety)., Disp: 180 tablet, Rfl: 3    celecoxib (CeleBREX) 200 MG capsule, Take 1 capsule by mouth Daily., Disp: 90 capsule, Rfl: 3    cloNIDine (CATAPRES) 0.1 MG tablet, TAKE 1 TABLET EVERY NIGHT, Disp: 90 tablet, Rfl: 3    Ergocalciferol (VITAMIN D2 PO), Take 1,000 Units by mouth Daily., Disp: , Rfl:     levothyroxine (SYNTHROID, LEVOTHROID) 100 MCG tablet, TAKE 1 TABLET DAILY ON AN EMPTY  "STOMACH, Disp: 90 tablet, Rfl: 3    loratadine (Claritin) 10 MG tablet, Take 1 tablet by mouth Daily., Disp: 90 tablet, Rfl: 3    LUMIGAN 0.01 % ophthalmic drops, , Disp: , Rfl:     multivitamin with minerals tablet tablet, Take 1 tablet by mouth Daily., Disp: , Rfl:     theophylline (THEODUR) 300 MG 12 hr tablet, TAKE 1 TABLET TWICE A DAY, Disp: 180 tablet, Rfl: 3    traZODone (DESYREL) 50 MG tablet, TAKE 1 TABLET EVERY NIGHT AT BEDTIME, Disp: 90 tablet, Rfl: 3    Turmeric 500 MG capsule, Take 1 capsule by mouth Daily., Disp: , Rfl:     cefdinir (OMNICEF) 300 MG capsule, Take 1 capsule by mouth 2 (Two) Times a Day for 3 days., Disp: 6 capsule, Rfl: 0    guaiFENesin (Mucinex) 600 MG 12 hr tablet, Take 2 tablets by mouth 2 (Two) Times a Day for 7 days., Disp: 28 tablet, Rfl: 0    methylPREDNISolone (MEDROL) 4 MG dose pack, Take as directed on package instructions., Disp: 1 tablet, Rfl: 0    spironolactone (ALDACTONE) 25 MG tablet, TAKE 1 TABLET DAILY, Disp: 90 tablet, Rfl: 3      /72 (BP Location: Left arm, Patient Position: Sitting, Cuff Size: Adult)   Pulse 84   Temp 98.4 °F (36.9 °C) (Infrared)   Resp 18   Ht 165.1 cm (65\")   Wt 69.9 kg (154 lb)   SpO2 98%   BMI 25.63 kg/m²      Body mass index is 25.63 kg/m².          Physical Exam  Vitals and nursing note reviewed.   Constitutional:       General: She is not in acute distress.     Appearance: She is ill-appearing. She is not toxic-appearing or diaphoretic.   HENT:      Head: Normocephalic and atraumatic.      Right Ear: Tympanic membrane, ear canal and external ear normal. There is no impacted cerumen. Tympanic membrane is erythematous.      Left Ear: Tympanic membrane, ear canal and external ear normal. There is no impacted cerumen. Tympanic membrane is erythematous.      Nose: Congestion and rhinorrhea present.      Mouth/Throat:      Mouth: Mucous membranes are moist.      Pharynx: Oropharynx is clear. Posterior oropharyngeal erythema present. No " oropharyngeal exudate.   Eyes:      General:         Right eye: No discharge.         Left eye: No discharge.      Extraocular Movements: Extraocular movements intact.      Conjunctiva/sclera: Conjunctivae normal.      Pupils: Pupils are equal, round, and reactive to light.   Cardiovascular:      Rate and Rhythm: Normal rate and regular rhythm.      Pulses: Normal pulses.      Heart sounds: Normal heart sounds.   Pulmonary:      Effort: Pulmonary effort is normal.      Breath sounds: Normal breath sounds.   Abdominal:      General: Bowel sounds are normal.      Palpations: Abdomen is soft.   Musculoskeletal:         General: Normal range of motion.      Cervical back: Normal range of motion and neck supple.   Lymphadenopathy:      Cervical: Cervical adenopathy present.   Skin:     General: Skin is warm and dry.   Neurological:      General: No focal deficit present.      Mental Status: She is alert and oriented to person, place, and time. Mental status is at baseline.   Psychiatric:         Mood and Affect: Mood normal.         Behavior: Behavior normal.         Thought Content: Thought content normal.         Judgment: Judgment normal.               Assessment & Plan   Diagnoses and all orders for this visit:    1. Acute non-recurrent pansinusitis (Primary)  -     cefdinir (OMNICEF) 300 MG capsule; Take 1 capsule by mouth 2 (Two) Times a Day for 3 days.  Dispense: 6 capsule; Refill: 0  -     guaiFENesin (Mucinex) 600 MG 12 hr tablet; Take 2 tablets by mouth 2 (Two) Times a Day for 7 days.  Dispense: 28 tablet; Refill: 0  -     methylPREDNISolone (MEDROL) 4 MG dose pack; Take as directed on package instructions.  Dispense: 1 tablet; Refill: 0    2. Sore throat    3. Nonintractable headache, unspecified chronicity pattern, unspecified headache type  -     POCT SARS-CoV-2 Antigen MANAV  -     POCT Influenza A/B    4. Body aches  -     POCT SARS-CoV-2 Antigen MANAV  -     POCT Influenza A/B    5. Sneezing  -     POCT  SARS-CoV-2 Antigen MANAV  -     POCT Influenza A/B               Plan of care was reviewed with Mrs. Casiano  She further explained that not too long ago she completed a round of Omnicef but still has 4 days left over? (Only sent in 3 additional days r/t this).  Also recently had a steroid injection at her rheumatologist office.  She was negative for rapid testing for influenza and COVID in the office today, we discussed how I have high suspicion of her having a current viral infection and not bacterial, however given her asthma status we reviewed signs and symptoms that would warrant antibiotic and steroid administration such as worsening overall illness, wheezing, shortness of breath, worsening cough, fevers, chills.  I advised to hold off on any antibiotic or steroid treatment unless the symptoms were to start becoming more predominant, for now I have encouraged her to continue with adequate water intake, deep breathing coughing exercises, saline spray, air humidifier, Tylenol, and will add Mucinex.  She expressed understanding, she is aware she can call with any further questions or concerns.  Unless otherwise warranted she will keep her next scheduled appointment.      Please note that portions of this note were completed with a voice recognition program.     Electronically signed by DESHAWN Sims, 01/15/24, 15:44 CST.

## 2024-01-26 ENCOUNTER — OFFICE VISIT (OUTPATIENT)
Dept: INTERNAL MEDICINE | Facility: CLINIC | Age: 79
End: 2024-01-26
Payer: MEDICARE

## 2024-01-26 VITALS
OXYGEN SATURATION: 100 % | HEIGHT: 65 IN | SYSTOLIC BLOOD PRESSURE: 126 MMHG | TEMPERATURE: 97.8 F | DIASTOLIC BLOOD PRESSURE: 70 MMHG | HEART RATE: 70 BPM | BODY MASS INDEX: 26.49 KG/M2 | WEIGHT: 159 LBS

## 2024-01-26 DIAGNOSIS — J45.30 MILD PERSISTENT ASTHMA WITHOUT COMPLICATION: ICD-10-CM

## 2024-01-26 DIAGNOSIS — U09.9 POST-COVID SYNDROME: ICD-10-CM

## 2024-01-26 DIAGNOSIS — R09.81 SINUS CONGESTION: ICD-10-CM

## 2024-01-26 DIAGNOSIS — J30.89 NON-SEASONAL ALLERGIC RHINITIS, UNSPECIFIED TRIGGER: ICD-10-CM

## 2024-01-26 DIAGNOSIS — R73.01 IMPAIRED FASTING GLUCOSE: ICD-10-CM

## 2024-01-26 DIAGNOSIS — Z79.899 ENCOUNTER FOR LONG-TERM CURRENT USE OF MEDICATION: ICD-10-CM

## 2024-01-26 DIAGNOSIS — E55.9 VITAMIN D DEFICIENCY: Primary | ICD-10-CM

## 2024-01-26 DIAGNOSIS — M85.80 OSTEOPENIA, UNSPECIFIED LOCATION: ICD-10-CM

## 2024-01-26 DIAGNOSIS — E03.9 HYPOTHYROIDISM (ACQUIRED): ICD-10-CM

## 2024-01-26 DIAGNOSIS — I10 BENIGN HYPERTENSION: ICD-10-CM

## 2024-01-26 PROBLEM — E66.3 OVERWEIGHT: Status: RESOLVED | Noted: 2020-05-11 | Resolved: 2024-01-26

## 2024-01-26 PROBLEM — E66.9 OBESITY (BMI 30-39.9): Status: RESOLVED | Noted: 2018-10-16 | Resolved: 2024-01-26

## 2024-01-26 NOTE — PROGRESS NOTES
The ABCs of the Annual Wellness Visit  Subsequent Medicare Wellness Visit    Chief Complaint   Patient presents with    Medicare Wellness-subsequent    Discuss Medication     Pt wanting to come off of some medicine; questions what she takes them for;  Eye dr thinks clonidine, trazodone, and buspirone are causing dry eyes    Hair Loss     Pt states she has noticed her hair thinning and falling out since having covid    PNEUMO VACCINE      Subjective    History of Present Illness:  Thania Casiano is a 78 y.o. female who presents for a Subsequent Medicare Wellness Visit.    The following portions of the patient's history were reviewed and   updated as appropriate: allergies, current medications, past family history, past medical history, past social history, past surgical history and problem list.    Compared to one year ago, the patient feels her physical   health is better.    Compared to one year ago, the patient feels her mental   health is the same.    Recent Hospitalizations:  She was not admitted to the hospital during the last year.       Current Medical Providers:  Patient Care Team:  Feliz Tirado MD as PCP - General (Internal Medicine)  Bryan Warner MD as Consulting Physician (Gastroenterology)  Denise Quan APRN as Nurse Practitioner (Pulmonary Disease)  Kirsten Caballero MD as Consulting Physician (Pulmonary Disease)  Natan Hughes MD as Consulting Physician (Pulmonary Disease)    Outpatient Medications Prior to Visit   Medication Sig Dispense Refill    acetaminophen (TYLENOL) 500 MG tablet Take 1 tablet by mouth 2 (Two) Times a Day.      Advair Diskus 100-50 MCG/ACT DISKUS USE 1 INHALATION TWICE A  each 3    albuterol sulfate HFA (ProAir HFA) 108 (90 Base) MCG/ACT inhaler Inhale 2 puffs Every 4 (Four) Hours As Needed for Wheezing. 25.5 g 3    buPROPion XL (WELLBUTRIN XL) 300 MG 24 hr tablet Take 1 tablet by mouth Daily. 90 tablet 3    celecoxib  (CeleBREX) 200 MG capsule Take 1 capsule by mouth Daily. 90 capsule 3    cloNIDine (CATAPRES) 0.1 MG tablet TAKE 1 TABLET EVERY NIGHT 90 tablet 3    Ergocalciferol (VITAMIN D2 PO) Take 1,000 Units by mouth Daily.      levothyroxine (SYNTHROID, LEVOTHROID) 100 MCG tablet TAKE 1 TABLET DAILY ON AN EMPTY STOMACH 90 tablet 3    loratadine (Claritin) 10 MG tablet Take 1 tablet by mouth Daily. 90 tablet 3    LUMIGAN 0.01 % ophthalmic drops       multivitamin with minerals tablet tablet Take 1 tablet by mouth Daily.      spironolactone (ALDACTONE) 25 MG tablet TAKE 1 TABLET DAILY 90 tablet 3    theophylline (THEODUR) 300 MG 12 hr tablet TAKE 1 TABLET TWICE A  tablet 3    traZODone (DESYREL) 50 MG tablet TAKE 1 TABLET EVERY NIGHT AT BEDTIME 90 tablet 3    Turmeric 500 MG capsule Take 1 capsule by mouth Daily.      busPIRone (BUSPAR) 5 MG tablet Take 1 tablet by mouth 2 (Two) Times a Day As Needed (anxiety). 180 tablet 3    methylPREDNISolone (MEDROL) 4 MG dose pack Take as directed on package instructions. (Patient not taking: Reported on 1/26/2024) 1 tablet 0     No facility-administered medications prior to visit.       No opioid medication identified on active medication list. I have reviewed chart for other potential  high risk medication/s and harmful drug interactions in the elderly.        Aspirin is not on active medication list.  Aspirin use is not indicated based on review of current medical condition/s. Risk of harm outweighs potential benefits.  .    Patient Active Problem List   Diagnosis    Carpal tunnel syndrome of right wrist    Personal history of nicotine dependence    Neoplasm of uncertain behavior of neck    Non-smoker    Benign neoplasm of neck    Mild persistent asthma without complication    Gastroesophageal reflux disease without esophagitis    Family history of polyps in the colon    Nausea    Constipation    Family hx of colon cancer    Family hx colonic polyps    Breast mass    Vitamin D  "deficiency    Anxiety and depression    Polymyalgia rheumatica    Osteopenia    LAFB (left anterior fascicular block)    Insomnia due to stress    Impaired fasting glucose    Hypokalemia    Gastroenteritis    Cervical radiculitis    Benign hypertension    Allergic-infective asthma    Lung nodule    Post-COVID syndrome    Hypothyroidism (acquired)    Closed 3-part fracture of proximal humerus with malunion, left    Other specified glaucoma     Advance Care Planning  Advance Directive is on file.  ACP discussion was held with the patient during this visit. Patient has an advance directive in EMR which is still valid.        Objective    Vitals:    24 1403   BP: 126/70   BP Location: Left arm   Patient Position: Sitting   Cuff Size: Adult   Pulse: 70   Temp: 97.8 °F (36.6 °C)   TempSrc: Infrared   SpO2: 100%   Weight: 72.1 kg (159 lb)   Height: 165.1 cm (65\")   PainSc: 0-No pain     Estimated body mass index is 26.46 kg/m² as calculated from the following:    Height as of this encounter: 165.1 cm (65\").    Weight as of this encounter: 72.1 kg (159 lb).           Does the patient have evidence of cognitive impairment? No      Lab Results   Component Value Date    CHLPL 152 2024    TRIG 102 2024    HDL 54 2024    LDL 79 2024    VLDL 19 2024    HGBA1C 4.80 2024            HEALTH RISK ASSESSMENT    Smoking Status:  Social History     Tobacco Use   Smoking Status Former    Packs/day: 1.00    Years: 25.00    Additional pack years: 0.00    Total pack years: 25.00    Types: Cigarettes    Quit date: 1980    Years since quittin.1   Smokeless Tobacco Never   Tobacco Comments    off and on      Alcohol Consumption:  Social History     Substance and Sexual Activity   Alcohol Use Not Currently    Comment: occ wine     Fall Risk Screen:    STEADI Fall Risk Assessment was completed, and patient is at MODERATE risk for falls. Assessment completed on:2024    Depression Screening:      " 2024     2:01 PM   PHQ-2/PHQ-9 Depression Screening   Little Interest or Pleasure in Doing Things 0-->not at all   Feeling Down, Depressed or Hopeless 0-->not at all   PHQ-9: Brief Depression Severity Measure Score 0       Health Habits and Functional and Cognitive Screenin/26/2024     2:01 PM   Functional & Cognitive Status   Do you have difficulty preparing food and eating? No   Do you have difficulty bathing yourself, getting dressed or grooming yourself? No   Do you have difficulty using the toilet? No   Do you have difficulty moving around from place to place? No   Do you have trouble with steps or getting out of a bed or a chair? No   Current Diet Well Balanced Diet   Dental Exam Up to date   Eye Exam Up to date   Exercise (times per week) 7 times per week   Current Exercises Include House Cleaning;Walking   Do you need help using the phone?  No   Are you deaf or do you have serious difficulty hearing?  No   Do you need help to go to places out of walking distance? No   Do you need help shopping? No   Do you need help preparing meals?  No   Do you need help with housework?  No   Do you need help with laundry? No   Do you need help taking your medications? No   Do you need help managing money? No   Do you ever drive or ride in a car without wearing a seat belt? No   Have you felt unusual stress, anger or loneliness in the last month? No   Who do you live with? Spouse   If you need help, do you have trouble finding someone available to you? No   Have you been bothered in the last four weeks by sexual problems? No   Do you have difficulty concentrating, remembering or making decisions? Yes       Age-appropriate Screening Schedule:  Refer to the list below for future screening recommendations based on patient's age, sex and/or medical conditions. Orders for these recommended tests are listed in the plan section. The patient has been provided with a written plan.    Health Maintenance   Topic Date Due     Pneumococcal Vaccine 65+ (1 of 2 - PCV) Never done    TDAP/TD VACCINES (1 - Tdap) Never done    ZOSTER VACCINE (2 of 2) 01/10/2021    COVID-19 Vaccine (4 - 2023-24 season) 09/01/2023    BMI FOLLOWUP  07/20/2024    COLORECTAL CANCER SCREENING  09/30/2024    ANNUAL WELLNESS VISIT  01/26/2025    LIPID PANEL  01/26/2025    DXA SCAN  06/12/2025    HEPATITIS C SCREENING  Completed    INFLUENZA VACCINE  Completed              Assessment & Plan   CMS Preventative Services Quick Reference  Risk Factors Identified During Encounter  Immunizations Discussed/Encouraged: Influenza, Prevnar 20 (Pneumococcal 20-valent conjugate), COVID19, and RSV (Respiratory Syncytial Virus)  Dental Screening Recommended  Vision Screening Recommended  The above risks/problems have been discussed with the patient.  Follow up actions/plans if indicated are seen below in the Assessment/Plan Section.  Pertinent information has been shared with the patient in the After Visit Summary.    Diagnoses and all orders for this visit:    1. Vitamin D deficiency (Primary)  -     Vitamin D,25-Hydroxy    2. Osteopenia, unspecified location  -     Vitamin D,25-Hydroxy    3. Hypothyroidism (acquired)  -     TSH Rfx On Abnormal To Free T4    4. Benign hypertension  -     CBC & Differential  -     Comprehensive Metabolic Panel  -     Urinalysis With Microscopic - Urine, Clean Catch    5. Impaired fasting glucose  -     Hemoglobin A1c    6. Encounter for long-term current use of medication  -     CBC & Differential  -     Comprehensive Metabolic Panel  -     Urinalysis With Microscopic - Urine, Clean Catch  -     Lipid Panel  -     TSH Rfx On Abnormal To Free T4    7. Sinus congestion    8. Non-seasonal allergic rhinitis, unspecified trigger    9. Post-COVID syndrome    10. Mild persistent asthma without complication    Other orders  -     Microscopic Examination -        Recommend at least annual dental and vision screening.  Recommend annual influenza  vaccination  Recommend a varied diet and appropriate portion sizes.   CDC recommendations for physical activity:  At least 150 minutes a week (for example, 30 minutes a day, 5 days a week) of moderate-intensity activity such as brisk walking. Or can consider 75 minutes a week of vigorous-intensity activity such as hiking, jogging, or running.  At least 2 days a week of activities that strengthen muscles.  Plus activities to improve balance.    Health Maintenance Summary            Overdue - Pneumococcal Vaccine 65+ (1 of 2 - PCV) Never done      06/24/2022  Postponed until 8/28/2022 by Qi Moreira MA (Pending event - going to check to see when and which one she had)              Overdue - TDAP/TD VACCINES (1 - Tdap) Never done      No completion, postpone, or frequency change history exists for this topic.              Overdue - ZOSTER VACCINE (2 of 2) Overdue since 1/10/2021      11/15/2020  Imm Admin: Shingrix              Overdue - COVID-19 Vaccine (4 - 2023-24 season) Overdue since 9/1/2023 01/17/2022  Imm Admin: COVID-19 (MODERNA) Monovalent Original Booster    03/12/2021  Imm Admin: COVID-19 (MODERNA) 1st,2nd,3rd Dose Monovalent    02/12/2021  Imm Admin: COVID-19 (MODERNA) 1st,2nd,3rd Dose Monovalent              BMI FOLLOWUP (Yearly) Next due on 7/20/2024 07/20/2023  [employer]: WORKFLOW - QUALITY MEASUREMENT - DOCUMENTED WEIGHT FOLLOW-UP PLAN    08/30/2022  SmartData: WORKFLOW - QUALITY MEASUREMENT - DOCUMENTED WEIGHT FOLLOW-UP PLAN    11/15/2021  Smart[employer]: WORKFLOW - QUALITY MEASUREMENT - DOCUMENTED WEIGHT FOLLOW-UP PLAN    04/12/2021  SmartData: WORKFLOW - QUALITY MEASUREMENT - DOCUMENTED WEIGHT FOLLOW-UP PLAN    03/29/2021  SmartData: WORKFLOW - QUALITY MEASUREMENT - DOCUMENTED WEIGHT FOLLOW-UP PLAN    Only the first 5 history entries have been loaded, but more history exists.              COLORECTAL CANCER SCREENING (COLONOSCOPY - Every 5 Years) Next due on 9/30/2024 03/16/2021  Fecal  Occult Blood component of POCT occult blood x 1 stool    09/30/2019  Surgical Procedure: COLONOSCOPY    09/30/2019  COLONOSCOPY    08/27/2015  SCANNED - COLONOSCOPY    08/02/2010  SCANNED - COLONOSCOPY    Only the first 5 history entries have been loaded, but more history exists.              ANNUAL WELLNESS VISIT (Yearly) Next due on 1/26/2025 01/26/2024  Done    01/19/2023  Level of Service: [place] PPPS, SUBSEQ VISIT    11/15/2021  Level of Service: CA PPPS, SUBSEQ VISIT    11/11/2020  Level of Service: CA PPPS, INITIAL VISIT              LIPID PANEL (Yearly) Next due on 1/26/2025 01/26/2024  Lipid Panel    01/19/2023  Lipid Panel    08/31/2022  Lipid panel    11/12/2021  Lipid Panel    11/05/2020  Lipid Panel    Only the first 5 history entries have been loaded, but more history exists.              DXA SCAN (Every 2 Years) Next due on 6/12/2025 06/12/2023  Outside Claim: CH DXA BONE DENSITY STUDY 1/> SITES AXIAL SKEL    11/24/2020  SCANNED - DEXA    03/15/2018  Outside Claim: CHG DEXA,BONE DENSITY, 1 + SITE, AXIAL SKELETON              HEPATITIS C SCREENING  Completed      11/05/2020  Hep C Virus Ab component of Hepatitis C Antibody              INFLUENZA VACCINE  Completed      12/08/2023  Imm Admin: Fluzone High-Dose 65+yrs    12/20/2022  Outside Claim: FLU IMMUNIZE ORDER/ADMIN    11/05/2022  Imm Admin: Fluzone High-Dose 65+yrs    10/25/2021  Imm Admin: Fluzone High Dose =>65 Years ([employer] ONLY)    11/11/2020  Imm Admin: Fluad Quad 65+    Only the first 5 history entries have been loaded, but more history exists.                        Result Review :           Follow Up:   Return in about 2 months (around 3/26/2024), or if symptoms worsen or fail to improve, for Recheck for blood pressure, attempt to remove clonidine, Recheck.     An After Visit Summary and PPPS were made available to the patient.                         KIARA Tirado MD, FACP, FHM      Electronically signed by Feliz Bains  "MD Candida, 01/26/24, 2:21 PM CST.    Additional E&M Note during same encounter follows:  Patient has multiple medical problems which are significant and separately identifiable that require additional work above and beyond the Medicare Wellness Visit.      Chief Complaint  Medicare Wellness-subsequent, Discuss Medication (Pt wanting to come off of some medicine; questions what she takes them for;/Eye dr thinks clonidine, trazodone, and buspirone are causing dry eyes), Hair Loss (Pt states she has noticed her hair thinning and falling out since having covid), and PNEUMO VACCINE    Subjective        HPI  Thania Casiano is also being seen today for dry eyes, remove medications  Patient here for the above problems.  See Assessment and Plan for further HPI components.        Objective   Vitals:    01/26/24 1403   BP: 126/70   BP Location: Left arm   Patient Position: Sitting   Cuff Size: Adult   Pulse: 70   Temp: 97.8 °F (36.6 °C)   TempSrc: Infrared   SpO2: 100%   Weight: 72.1 kg (159 lb)   Height: 165.1 cm (65\")   PainSc: 0-No pain     Physical Exam  Vitals and nursing note reviewed.   Constitutional:       Appearance: She is not ill-appearing.   Eyes:      General: No scleral icterus.     Conjunctiva/sclera: Conjunctivae normal.   Pulmonary:      Effort: Pulmonary effort is normal. No respiratory distress.   Skin:     General: Skin is warm.      Coloration: Skin is not pale.   Neurological:      General: No focal deficit present.      Mental Status: She is alert and oriented to person, place, and time.   Psychiatric:         Mood and Affect: Mood normal.         Behavior: Behavior normal.                             Assessment and Plan   Diagnoses and all orders for this visit:    1. Vitamin D deficiency (Primary)  -     Vitamin D,25-Hydroxy    2. Osteopenia, unspecified location  -     Vitamin D,25-Hydroxy    3. Hypothyroidism (acquired)  -     TSH Rfx On Abnormal To Free T4    4. Benign hypertension  -    "  CBC & Differential  -     Comprehensive Metabolic Panel  -     Urinalysis With Microscopic - Urine, Clean Catch    5. Impaired fasting glucose  -     Hemoglobin A1c    6. Encounter for long-term current use of medication  -     CBC & Differential  -     Comprehensive Metabolic Panel  -     Urinalysis With Microscopic - Urine, Clean Catch  -     Lipid Panel  -     TSH Rfx On Abnormal To Free T4    7. Sinus congestion    8. Non-seasonal allergic rhinitis, unspecified trigger    9. Post-COVID syndrome    10. Mild persistent asthma without complication    Other orders  -     Microscopic Examination -      She would like to come off some medications as her doctor indicated may be causing dry eye.  Gave tapering process of the trazodone written instructions.  Buspirone can simply be stopped or transitioned to once daily then stop.  Clonidine she would like transition off, we will work towards removing this at next visit.    Theophylline, will defer to her lung doctor on considering removal of that medication.    Labs to follow           Follow Up   Return in about 2 months (around 3/26/2024), or if symptoms worsen or fail to improve, for Recheck for blood pressure, attempt to remove clonidine, Recheck.  Patient was given instructions and counseling regarding her condition or for health maintenance advice. Please see specific information pulled into the AVS if appropriate.

## 2024-01-27 LAB
25(OH)D3+25(OH)D2 SERPL-MCNC: 60.8 NG/ML (ref 30–100)
ALBUMIN SERPL-MCNC: 4.3 G/DL (ref 3.5–5.2)
ALBUMIN/GLOB SERPL: 2.4 G/DL
ALP SERPL-CCNC: 96 U/L (ref 39–117)
ALT SERPL-CCNC: 23 U/L (ref 1–33)
APPEARANCE UR: CLEAR
AST SERPL-CCNC: 23 U/L (ref 1–32)
BACTERIA #/AREA URNS HPF: NORMAL /HPF
BASOPHILS # BLD AUTO: 0.03 10*3/MM3 (ref 0–0.2)
BASOPHILS NFR BLD AUTO: 0.6 % (ref 0–1.5)
BILIRUB SERPL-MCNC: 0.3 MG/DL (ref 0–1.2)
BILIRUB UR QL STRIP: NEGATIVE
BUN SERPL-MCNC: 19 MG/DL (ref 8–23)
BUN/CREAT SERPL: 19.2 (ref 7–25)
CALCIUM SERPL-MCNC: 9.5 MG/DL (ref 8.6–10.5)
CASTS URNS MICRO: NORMAL
CHLORIDE SERPL-SCNC: 105 MMOL/L (ref 98–107)
CHOLEST SERPL-MCNC: 152 MG/DL (ref 0–200)
CO2 SERPL-SCNC: 27.7 MMOL/L (ref 22–29)
COLOR UR: YELLOW
CREAT SERPL-MCNC: 0.99 MG/DL (ref 0.57–1)
EGFRCR SERPLBLD CKD-EPI 2021: 58.8 ML/MIN/1.73
EOSINOPHIL # BLD AUTO: 0.06 10*3/MM3 (ref 0–0.4)
EOSINOPHIL NFR BLD AUTO: 1.2 % (ref 0.3–6.2)
EPI CELLS #/AREA URNS HPF: NORMAL /HPF
ERYTHROCYTE [DISTWIDTH] IN BLOOD BY AUTOMATED COUNT: 12.5 % (ref 12.3–15.4)
GLOBULIN SER CALC-MCNC: 1.8 GM/DL
GLUCOSE SERPL-MCNC: 74 MG/DL (ref 65–99)
GLUCOSE UR QL STRIP: NEGATIVE
HBA1C MFR BLD: 4.8 % (ref 4.8–5.6)
HCT VFR BLD AUTO: 38.6 % (ref 34–46.6)
HDLC SERPL-MCNC: 54 MG/DL (ref 40–60)
HGB BLD-MCNC: 13 G/DL (ref 12–15.9)
HGB UR QL STRIP: NEGATIVE
IMM GRANULOCYTES # BLD AUTO: 0.05 10*3/MM3 (ref 0–0.05)
IMM GRANULOCYTES NFR BLD AUTO: 1 % (ref 0–0.5)
KETONES UR QL STRIP: NEGATIVE
LDLC SERPL CALC-MCNC: 79 MG/DL (ref 0–100)
LEUKOCYTE ESTERASE UR QL STRIP: NEGATIVE
LYMPHOCYTES # BLD AUTO: 1.26 10*3/MM3 (ref 0.7–3.1)
LYMPHOCYTES NFR BLD AUTO: 24.5 % (ref 19.6–45.3)
MCH RBC QN AUTO: 31.8 PG (ref 26.6–33)
MCHC RBC AUTO-ENTMCNC: 33.7 G/DL (ref 31.5–35.7)
MCV RBC AUTO: 94.4 FL (ref 79–97)
MONOCYTES # BLD AUTO: 0.47 10*3/MM3 (ref 0.1–0.9)
MONOCYTES NFR BLD AUTO: 9.1 % (ref 5–12)
NEUTROPHILS # BLD AUTO: 3.28 10*3/MM3 (ref 1.7–7)
NEUTROPHILS NFR BLD AUTO: 63.6 % (ref 42.7–76)
NITRITE UR QL STRIP: NEGATIVE
NRBC BLD AUTO-RTO: 0 /100 WBC (ref 0–0.2)
PH UR STRIP: 5.5 [PH] (ref 5–8)
PLATELET # BLD AUTO: 225 10*3/MM3 (ref 140–450)
POTASSIUM SERPL-SCNC: 3.8 MMOL/L (ref 3.5–5.2)
PROT SERPL-MCNC: 6.1 G/DL (ref 6–8.5)
PROT UR QL STRIP: NEGATIVE
RBC # BLD AUTO: 4.09 10*6/MM3 (ref 3.77–5.28)
RBC #/AREA URNS HPF: NORMAL /HPF
SODIUM SERPL-SCNC: 140 MMOL/L (ref 136–145)
SP GR UR STRIP: 1.01 (ref 1–1.03)
TRIGL SERPL-MCNC: 102 MG/DL (ref 0–150)
TSH SERPL DL<=0.005 MIU/L-ACNC: 0.92 UIU/ML (ref 0.27–4.2)
UROBILINOGEN UR STRIP-MCNC: NORMAL MG/DL
VLDLC SERPL CALC-MCNC: 19 MG/DL (ref 5–40)
WBC # BLD AUTO: 5.15 10*3/MM3 (ref 3.4–10.8)
WBC #/AREA URNS HPF: NORMAL /HPF

## 2024-03-11 DIAGNOSIS — J45.30 MILD PERSISTENT ASTHMA WITHOUT COMPLICATION: ICD-10-CM

## 2024-03-11 RX ORDER — FLUTICASONE PROPIONATE AND SALMETEROL 100; 50 UG/1; UG/1
1 POWDER RESPIRATORY (INHALATION) 2 TIMES DAILY
Qty: 180 EACH | Refills: 3 | Status: SHIPPED | OUTPATIENT
Start: 2024-03-11

## 2024-03-11 NOTE — TELEPHONE ENCOUNTER
Rx Refill Note  Requested Prescriptions     Pending Prescriptions Disp Refills    Wixela Inhub 100-50 MCG/ACT DISKUS [Pharmacy Med Name: WIXELA INHUB 60'S 100/50] 180 each 3     Sig: USE 1 INHALATION TWICE A DAY      Last office visit with prescribing clinician: 9/26/2023   Last telemedicine visit with prescribing clinician: Visit date not found   Next office visit with prescribing clinician: 10/3/2024                         Would you like a call back once the refill request has been completed: [] Yes [] No    If the office needs to give you a call back, can they leave a voicemail: [] Yes [] No    Lucía Blankenship MA  03/11/24, 10:15 CDT

## 2024-03-29 ENCOUNTER — OFFICE VISIT (OUTPATIENT)
Dept: INTERNAL MEDICINE | Facility: CLINIC | Age: 79
End: 2024-03-29
Payer: MEDICARE

## 2024-03-29 VITALS
WEIGHT: 157 LBS | HEIGHT: 65 IN | HEART RATE: 69 BPM | DIASTOLIC BLOOD PRESSURE: 72 MMHG | SYSTOLIC BLOOD PRESSURE: 130 MMHG | BODY MASS INDEX: 26.16 KG/M2 | OXYGEN SATURATION: 98 % | TEMPERATURE: 97.9 F

## 2024-03-29 DIAGNOSIS — J45.30 MILD PERSISTENT ASTHMA WITHOUT COMPLICATION: ICD-10-CM

## 2024-03-29 DIAGNOSIS — E03.9 HYPOTHYROIDISM (ACQUIRED): ICD-10-CM

## 2024-03-29 DIAGNOSIS — M35.3 POLYMYALGIA RHEUMATICA: ICD-10-CM

## 2024-03-29 DIAGNOSIS — H04.129 DRY EYE: ICD-10-CM

## 2024-03-29 DIAGNOSIS — F95.8 BEHAVIORAL TIC: ICD-10-CM

## 2024-03-29 DIAGNOSIS — I10 BENIGN HYPERTENSION: Primary | ICD-10-CM

## 2024-03-30 PROBLEM — F51.01 PRIMARY INSOMNIA: Status: ACTIVE | Noted: 2020-09-11

## 2024-03-30 NOTE — PROGRESS NOTES
"      Chief Complaint  Hypertension (2 month follow up ), pneumo vaccine, and Insomnia    Subjective        Thania Casiano presents to [facility]    HPI    Patient here for the above problems.  See Assessment and Plan for further HPI components.      Review of Systems    Objective   Vital Signs:  /72 (BP Location: Left arm, Patient Position: Sitting, Cuff Size: Adult)   Pulse 69   Temp 97.9 °F (36.6 °C) (Temporal)   Ht 165.1 cm (65\")   Wt 71.2 kg (157 lb)   SpO2 98%   BMI 26.13 kg/m²   Estimated body mass index is 26.13 kg/m² as calculated from the following:    Height as of this encounter: 165.1 cm (65\").    Weight as of this encounter: 71.2 kg (157 lb).      Physical Exam  Vitals and nursing note reviewed.   Constitutional:       Appearance: She is not ill-appearing.   Eyes:      General: No scleral icterus.     Conjunctiva/sclera: Conjunctivae normal.   Pulmonary:      Effort: Pulmonary effort is normal. No respiratory distress.   Skin:     General: Skin is warm.      Coloration: Skin is not pale.   Neurological:      General: No focal deficit present.      Mental Status: She is alert and oriented to person, place, and time.      Comments: Tic, licking lips  Tremor left hand, repeated movements  No rigidity noted     Psychiatric:         Mood and Affect: Mood normal.         Behavior: Behavior normal.                       Assessment and Plan   Diagnoses and all orders for this visit:    1. Benign hypertension (Primary)    2. Dry eye    3. Polymyalgia rheumatica    4. Hypothyroidism (acquired)    5. Behavioral tic    6. Mild persistent asthma without complication        Patient has hypertension.  BP is at goal.  The patient's BP goal is < 140/90  Recommend ambulatory BP monitoring after patient has taken medication.  Recommend varying the times of the blood pressure readings.  Patient is currently on spironolactone.  Recommend we  stop clonidine   Continue to " monitor.    Patient has dry eye.  Patient follows with Dr. Corrales.  Gave information on dry eye center.  Patient doing OTC drops at present.      Patient noted to have tic.  Patient rubs left hand and licks lips. Patient reports only present when anxious or in public.  Patient reports that she can stop it with effort but does it frequently when not activelly trying to suppress.  I think behavioral tic.  I think anxiety related.  No medications common for tic.  Theypholline can cause inosmnia restlessness, tremor.  May be worthwhile to discontinue.     She's going to talk to pulmonary to see if still needs theophylline.  She reports since starting combo inhalers she has had no issues.    Patient has hypothyroidism.  TSH last time was normal.  Continue current dose.    Patient reports she has had several pneumonia vaccines in the past.  She thought she had gotten here.  Will check old records.  She said may have had at [facility].  Will call over there.        Result Review :                   BMI is >= 25 and <30. (Overweight) The following options were offered after discussion;: exercise counseling/recommendations and nutrition counseling/recommendations            Follow Up   Return in about 3 months (around 6/29/2024), or if symptoms worsen or fail to improve.  Follow-up on above problems.  Longitudinal care.  Patient was given instructions and counseling regarding her condition or for health maintenance advice. Please see specific information pulled into the AVS if appropriate.       KIARA Tirado MD, FACP, [facility]      Electronically signed by Feliz Tirado MD, 03/30/24, 10:27 AM CDT.

## 2024-05-06 DIAGNOSIS — F43.23 SITUATIONAL MIXED ANXIETY AND DEPRESSIVE DISORDER: ICD-10-CM

## 2024-05-06 DIAGNOSIS — J45.30 MILD PERSISTENT ASTHMA WITHOUT COMPLICATION: ICD-10-CM

## 2024-05-06 RX ORDER — THEOPHYLLINE 300 MG/1
TABLET, EXTENDED RELEASE ORAL
Qty: 180 TABLET | Refills: 3 | Status: SHIPPED | OUTPATIENT
Start: 2024-05-06

## 2024-05-06 RX ORDER — BUPROPION HYDROCHLORIDE 300 MG/1
300 TABLET ORAL DAILY
Qty: 90 TABLET | Refills: 3 | Status: SHIPPED | OUTPATIENT
Start: 2024-05-06

## 2024-06-01 DIAGNOSIS — R09.81 SINUS CONGESTION: ICD-10-CM

## 2024-06-01 DIAGNOSIS — J30.89 NON-SEASONAL ALLERGIC RHINITIS, UNSPECIFIED TRIGGER: ICD-10-CM

## 2024-06-03 RX ORDER — LORATADINE 10 MG/1
10 TABLET ORAL DAILY
Qty: 90 TABLET | Refills: 3 | Status: SHIPPED | OUTPATIENT
Start: 2024-06-03

## 2024-06-03 RX ORDER — LEVOTHYROXINE SODIUM 100 MCG
100 TABLET ORAL DAILY
Qty: 90 TABLET | Refills: 3 | Status: SHIPPED | OUTPATIENT
Start: 2024-06-03

## 2024-06-07 ENCOUNTER — TELEPHONE (OUTPATIENT)
Dept: INTERNAL MEDICINE | Facility: CLINIC | Age: 79
End: 2024-06-07

## 2024-06-07 NOTE — TELEPHONE ENCOUNTER
Caller: Thania Casiano    Relationship: Self    Best call back number: [phone]     What orders are you requesting (i.e. lab or imaging): MAMMOGRAM    In what timeframe would the patient need to come in: AS SOON AS POSSIBLE    Where will you receive your lab/imaging services[facility]     Additional notes: PATIENT HAS HER APPOINTMENT ON 06.13.24. PLEASE CALL BACK WHEN THIS HAS BEEN SENT.

## 2024-06-10 DIAGNOSIS — Z12.31 SCREENING MAMMOGRAM, ENCOUNTER FOR: Primary | ICD-10-CM

## 2024-06-10 DIAGNOSIS — Z78.0 POST-MENOPAUSAL: ICD-10-CM

## 2024-06-12 ENCOUNTER — TELEPHONE (OUTPATIENT)
Dept: INTERNAL MEDICINE | Facility: CLINIC | Age: 79
End: 2024-06-12

## 2024-06-12 DIAGNOSIS — Z78.0 POST-MENOPAUSAL: ICD-10-CM

## 2024-06-12 DIAGNOSIS — Z12.31 SCREENING MAMMOGRAM, ENCOUNTER FOR: ICD-10-CM

## 2024-06-12 NOTE — TELEPHONE ENCOUNTER
Caller: Thania Casiano    Relationship: Self    Best call back number: [phone]     What is the best time to reach you: AFTER 1 PM     Who are you requesting to speak with (clinical staff, provider,  specific staff member): NONE SPECIFIED     What was the call regarding:     PATIENT STATES [facility] RECEIVED AN ORDER FOR A BONE DENSITY TEST, HOWEVER THEY DID NOT RECEIVE HER ORDER FOR A SCREENING MAMMOGRAM. PATIENT STATES SHE HAS AN APPOINTMENT FOR A MAMMOGRAM ON 06.13.24, AND NEEDS THIS ORDER ASAP.     PLEASE FOLLOW-UP WITH PATIENT REGARDING THIS CONCERN.     Is it okay if the provider responds through [facility]: PLEASE CALL

## 2024-06-24 ENCOUNTER — TELEPHONE (OUTPATIENT)
Dept: INTERNAL MEDICINE | Facility: CLINIC | Age: 79
End: 2024-06-24
Payer: MEDICARE

## 2024-06-24 NOTE — TELEPHONE ENCOUNTER
"----- Message from Feliz Tirado sent at 6/21/2024  6:05 PM CDT -----      Per Dr. Candida MD:  \"Mammogram normal.  Repeat mammogram in 1 year.\"      Notified patient of results, patient voiced understanding.   "

## 2024-07-01 ENCOUNTER — OFFICE VISIT (OUTPATIENT)
Dept: INTERNAL MEDICINE | Facility: CLINIC | Age: 79
End: 2024-07-01
Payer: MEDICARE

## 2024-07-01 VITALS
OXYGEN SATURATION: 97 % | SYSTOLIC BLOOD PRESSURE: 124 MMHG | HEIGHT: 65 IN | TEMPERATURE: 97.6 F | WEIGHT: 157 LBS | BODY MASS INDEX: 26.16 KG/M2 | DIASTOLIC BLOOD PRESSURE: 68 MMHG | HEART RATE: 56 BPM

## 2024-07-01 DIAGNOSIS — M54.12 CERVICAL RADICULITIS: Primary | ICD-10-CM

## 2024-07-01 DIAGNOSIS — F32.A ANXIETY AND DEPRESSION: ICD-10-CM

## 2024-07-01 DIAGNOSIS — F41.9 ANXIETY AND DEPRESSION: ICD-10-CM

## 2024-07-01 DIAGNOSIS — M54.50 LUMBAR BACK PAIN: ICD-10-CM

## 2024-07-01 DIAGNOSIS — K21.9 GASTROESOPHAGEAL REFLUX DISEASE WITHOUT ESOPHAGITIS: ICD-10-CM

## 2024-07-01 DIAGNOSIS — F51.01 PRIMARY INSOMNIA: ICD-10-CM

## 2024-07-01 DIAGNOSIS — I10 BENIGN HYPERTENSION: ICD-10-CM

## 2024-07-01 DIAGNOSIS — E03.9 HYPOTHYROIDISM (ACQUIRED): ICD-10-CM

## 2024-07-01 DIAGNOSIS — R73.01 IMPAIRED FASTING GLUCOSE: ICD-10-CM

## 2024-07-01 PROBLEM — K52.9 GASTROENTERITIS: Status: RESOLVED | Noted: 2020-09-11 | Resolved: 2024-07-01

## 2024-07-01 NOTE — PROGRESS NOTES
"      Chief Complaint  Hypertension (3 month follow up//Wants to talk to you about Spironolactone and Meloxicam) and Insomnia (Still not sleeping, falls asleep then wakes up in the middle of the night wide awake and when she goes back to sleep she feels like she has a hangover.)    Subjective        Thania Casiano presents to [facility]    HPI    Patient here for the above problems.  See Assessment and Plan for further HPI components.      Review of Systems    Objective   Vital Signs:  /68 (BP Location: Left arm, Patient Position: Sitting, Cuff Size: Adult)   Pulse 56   Temp 97.6 °F (36.4 °C) (Temporal)   Ht 165.1 cm (65\")   Wt 71.2 kg (157 lb)   SpO2 97%   BMI 26.13 kg/m²   Estimated body mass index is 26.13 kg/m² as calculated from the following:    Height as of this encounter: 165.1 cm (65\").    Weight as of this encounter: 71.2 kg (157 lb).      Physical Exam  Vitals and nursing note reviewed.   Constitutional:       Appearance: She is not ill-appearing.   Eyes:      General: No scleral icterus.     Conjunctiva/sclera: Conjunctivae normal.   Pulmonary:      Effort: Pulmonary effort is normal. No respiratory distress.      Comments: Mildly diminished at bases  Skin:     General: Skin is warm.      Coloration: Skin is not pale.   Neurological:      General: No focal deficit present.      Mental Status: She is alert and oriented to person, place, and time.   Psychiatric:         Mood and Affect: Mood normal.         Behavior: Behavior normal.                     Assessment and Plan   Diagnoses and all orders for this visit:    1. Cervical radiculitis (Primary)  -     Ambulatory Referral to Massage Therapy    2. Lumbar back pain  -     Ambulatory Referral to Massage Therapy    3. Hypothyroidism (acquired)  -     Lipid Panel; Future  -     TSH Rfx On Abnormal To Free T4; Future    4. Benign hypertension  -     Comprehensive Metabolic Panel; Future  -     CBC & " Differential; Future  -     Lipid Panel; Future  -     Urinalysis With Microscopic - Urine, Clean Catch; Future    5. Anxiety and depression    6. Primary insomnia    7. Gastroesophageal reflux disease without esophagitis    8. Impaired fasting glucose  -     Hemoglobin A1c; Future    Patient has chronic lumbar and neck pain.  Patient also has arthritis of her hands.  Patient follows with massage therapist.  Patient would like referral so that her massage therapist can continue to provide care to her.  Patient indicates that it helped significantly.I think beneficial to continue these conservative measures to avoid long-term medication.    Patient has a history of hypothyroidism.  The patient is on Synthroid 100 mcg.  Patient tolerated medication without any significant issues.    Patient has a history of anxiety and depression.  The patient is on Wellbutrin 300 mg daily.  Recommend we continue this present dose.  Patient seems to be tolerating it well without any significant issues.    Patient is asking about the spironolactone.  Discussed with her that this medication is for her blood pressure.  Patient brought in some ambulatory blood pressure readings.  These are overall well-controlled.  There are some go up to 140, but the overall trend seems to be adequate.  I would recommend that she continue the Aldactone/spironolactone.  Continue ambulatory blood pressure monitoring.    Patient has a history of acid reflux.  Patient was trying meloxicam for her pain, however this was upsetting to her GERD.  Patient has since switched back to Celebrex.  Patient tolerating this medication without any significant issues.  Patient is noted to not be on an H2 blocker or a PPI.    Labs prior to next visit.     Result Review :                               Follow Up   Return in about 30 weeks (around 1/27/2025), or if symptoms worsen or fail to improve, for Medicare Wellness - Labs prior to visit, Med Check.  Patient was given  instructions and counseling regarding her condition or for health maintenance advice. Please see specific information pulled into the AVS if appropriate.       KIARA Tirado MD, FACP, FHM      Electronically signed by Feliz Triado MD, 07/01/24, 11:27 AM CDT.

## 2024-07-10 ENCOUNTER — TELEPHONE (OUTPATIENT)
Dept: GASTROENTEROLOGY | Facility: CLINIC | Age: 79
End: 2024-07-10
Payer: MEDICARE

## 2024-07-10 NOTE — TELEPHONE ENCOUNTER
Provider: DR COLLEEN GALVAN     Caller: SAHIL FLORENTINO     Relationship to Patient: SELF     Phone Number: [phone]     Reason for Call: PT WANTS TO KNOW IF SHE STILL NEEDS TO HAVE COLONOSCOPY'S AT HER AGE PT THOUGHT YOU STOPPED HAVING A COLONOSCOPY AT 76 PLEASE ADVISE AND CALL PT BACK      [FreeTextEntry1] : This is a 69 year old lady that presents today for follow up after recent hospitalization as she was diagnosed with COVID-19 in march. Patient was intubated and was placed on a feeding tube at [facility] for three months. She was discharged in May to the rehab. Patient states that she does have some residual effects of COVID. Patient states that her right hand is contracted and she is having difficulty picking things up and holding onto things. She also reports numbness to  her fingers to her wrist and numbness  to her left foot. Patient also reports hair thinning. Patient states that she has been having memory loss since the admission. She is having difficulty recalling this office and the details prior to her COVID admission. Patient also states that she is experiencing shortness of breath and chest tightness that she states she has experienced before. She is concerned and wants to make sure that there is no blockage in her heart.

## 2024-07-10 NOTE — TELEPHONE ENCOUNTER
Called patient to schedule recall colon screening-no answer-left message to call the office to schedule

## 2024-08-15 RX ORDER — CELECOXIB 200 MG/1
200 CAPSULE ORAL DAILY
Qty: 90 CAPSULE | Refills: 3 | Status: SHIPPED | OUTPATIENT
Start: 2024-08-15

## 2024-09-23 RX ORDER — SPIRONOLACTONE 25 MG/1
TABLET ORAL
Qty: 90 TABLET | Refills: 3 | Status: SHIPPED | OUTPATIENT
Start: 2024-09-23

## 2024-09-24 ENCOUNTER — OFFICE VISIT (OUTPATIENT)
Dept: INTERNAL MEDICINE | Facility: CLINIC | Age: 79
End: 2024-09-24
Payer: MEDICARE

## 2024-09-24 ENCOUNTER — HOSPITAL ENCOUNTER (OUTPATIENT)
Dept: ULTRASOUND IMAGING | Facility: HOSPITAL | Age: 79
Discharge: HOME | End: 2024-09-24
Admitting: INTERNAL MEDICINE
Payer: MEDICARE

## 2024-09-24 VITALS
BODY MASS INDEX: 25.66 KG/M2 | OXYGEN SATURATION: 98 % | TEMPERATURE: 98.4 F | HEART RATE: 68 BPM | HEIGHT: 65 IN | WEIGHT: 154 LBS | DIASTOLIC BLOOD PRESSURE: 78 MMHG | SYSTOLIC BLOOD PRESSURE: 122 MMHG

## 2024-09-24 DIAGNOSIS — J02.9 ACUTE PHARYNGITIS, UNSPECIFIED ETIOLOGY: ICD-10-CM

## 2024-09-24 DIAGNOSIS — R59.9 ENLARGED LYMPH NODE: Primary | ICD-10-CM

## 2024-09-24 PROCEDURE — 3078F DIAST BP <80 MM HG: CPT | Performed by: INTERNAL MEDICINE

## 2024-09-24 PROCEDURE — 1159F MED LIST DOCD IN RCRD: CPT | Performed by: INTERNAL MEDICINE

## 2024-09-24 PROCEDURE — 76536 US EXAM OF HEAD AND NECK: CPT

## 2024-09-24 PROCEDURE — 99213 OFFICE O/P EST LOW 20 MIN: CPT | Performed by: INTERNAL MEDICINE

## 2024-09-24 PROCEDURE — 1126F AMNT PAIN NOTED NONE PRSNT: CPT | Performed by: INTERNAL MEDICINE

## 2024-09-24 PROCEDURE — 3074F SYST BP LT 130 MM HG: CPT | Performed by: INTERNAL MEDICINE

## 2024-09-24 PROCEDURE — 1160F RVW MEDS BY RX/DR IN RCRD: CPT | Performed by: INTERNAL MEDICINE

## 2024-10-01 ENCOUNTER — OFFICE VISIT (OUTPATIENT)
Dept: GASTROENTEROLOGY | Facility: CLINIC | Age: 79
End: 2024-10-01
Payer: MEDICARE

## 2024-10-01 VITALS
HEIGHT: 66 IN | WEIGHT: 168 LBS | DIASTOLIC BLOOD PRESSURE: 78 MMHG | TEMPERATURE: 97.1 F | OXYGEN SATURATION: 98 % | SYSTOLIC BLOOD PRESSURE: 150 MMHG | BODY MASS INDEX: 27 KG/M2 | HEART RATE: 67 BPM

## 2024-10-01 DIAGNOSIS — Z80.0 FAMILY HX OF COLON CANCER: Primary | ICD-10-CM

## 2024-10-01 DIAGNOSIS — Z83.719 FAMILY HX COLONIC POLYPS: ICD-10-CM

## 2024-10-01 NOTE — PROGRESS NOTES
[facility]    Primary Physician Feliz Tirado MD    10/1/2024    Thania Casiano   1945      Chief Complaint   Patient presents with    Colonoscopy       Subjective     HPI    Thania Casiano is a 79 y.o. female who presents as a referral for preventative maintenance. She has no complaints of nausea or vomiting. No change in bowels. No wt loss. No BRBPR. No melena. No abdominal pain.         COLONOSCOPY (09/30/2019 09:11) recall 5 years.   2010 collagenous colitis.     Father had colon polyps.   There is family history of colon cancer, see family history.       Past Medical History:   Diagnosis Date    Anxiety     Arthritis     Asthma     COVID-19 05/2022    POST COMPLICATIONS FROM COVID 19 2022    Depression     GERD (gastroesophageal reflux disease)     HAS SUBSIDED FOR NOW    Glaucoma     H. 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    Procedure: CERVICAL SPINAL TUMOR REMOVAL RIGHT;  Surgeon: Yoav Palencia MD;  Location: [facility] OR;  Service:     CHOLECYSTECTOMY      COLONOSCOPY  08/27/2015    COLONOSCOPY N/A 09/30/2019    Procedure: COLONOSCOPY WITH ANESTHESIA;  Surgeon: Bryan Warner MD;  Location: [facility] ENDOSCOPY;  Service: Gastroenterology    ENDOSCOPY  08/24/2010    ENDOSCOPY N/A 09/30/2019    Procedure: ESOPHAGOGASTRODUODENOSCOPY WITH ANESTHESIA;  Surgeon: Bryan Warner MD;  Location: [facility] ENDOSCOPY;  Service: Gastroenterology    ENDOSCOPY N/A 04/09/2021    Procedure: ESOPHAGOGASTRODUODENOSCOPY WITH ANESTHESIA;  Surgeon: Bryan Warner MD;   Location:  [facility];  Service: Gastroenterology;  Laterality: N/A;  preop; nausea  postop: small bowel diverticulum,  PCP Davon Ashley     GALLBLADDER SURGERY      HYSTERECTOMY      TONSILLECTOMY      TOTAL SHOULDER ARTHROPLASTY W/ DISTAL CLAVICLE EXCISION Left 3/28/2023    Procedure: LEFT REVERSE TOTAL SHOULDER ARTHROPLASTY;  Surgeon: Roger Akhtar MD;  Location: [facility] OR;  Service: Orthopedics;  Laterality: Left;       Outpatient Medications Marked as Taking for the 10/1/24 encounter (Office Visit) with Lori Arthur APRN   Medication Sig Dispense Refill    acetaminophen (TYLENOL) 500 MG tablet Take 1 tablet by mouth 2 (Two) Times a Day.      albuterol sulfate HFA (ProAir HFA) 108 (90 Base) MCG/ACT inhaler Inhale 2 puffs Every 4 (Four) Hours As Needed for Wheezing. 25.5 g 3    buPROPion XL (WELLBUTRIN XL) 300 MG 24 hr tablet TAKE 1 TABLET DAILY 90 tablet 3    celecoxib (CeleBREX) 200 MG capsule TAKE 1 CAPSULE DAILY 90 capsule 3    Ergocalciferol (VITAMIN D2 PO) Take 1,000 Units by mouth Daily.      loratadine (CLARITIN) 10 MG tablet TAKE 1 TABLET DAILY (Patient taking differently: Take 1 tablet by mouth As Needed.) 90 tablet 3    LUMIGAN 0.01 % ophthalmic drops       multivitamin with minerals tablet tablet Take 1 tablet by mouth Daily.      spironolactone (ALDACTONE) 25 MG tablet TAKE 1 TABLET DAILY 90 tablet 3    Synthroid 100 MCG tablet TAKE 1 TABLET DAILY ON AN EMPTY STOMACH 90 tablet 3    theophylline (THEODUR) 300 MG 12 hr tablet TAKE 1 TABLET TWICE A  tablet 3    Wixela Inhub 100-50 MCG/ACT DISKUS USE 1 INHALATION TWICE A  each 3       No Known Allergies    Social History     Socioeconomic History    Marital status:    Tobacco Use    Smoking status: Former     Current packs/day: 0.00     Average packs/day: 1 pack/day for 25.0 years (25.0 ttl pk-yrs)     Types: Cigarettes     Start date:      Quit date: 1980     Years since quittin.7    Smokeless tobacco:  Never    Tobacco comments:     off and on    Vaping Use    Vaping status: Never Used   Substance and Sexual Activity    Alcohol use: Not Currently    Drug use: No    Sexual activity: Not Currently     Partners: Male     Birth control/protection: Surgical       Family History   Problem Relation Age of Onset    Colon polyps Father     Colon cancer Other     Colon cancer Paternal Uncle     Colon cancer Paternal Uncle        Review of Systems   Constitutional:  Negative for chills, fever and unexpected weight change.   Respiratory:  Negative for shortness of breath.    Cardiovascular:  Negative for chest pain.   Gastrointestinal:  Negative for abdominal distention, abdominal pain, anal bleeding, blood in stool, constipation, diarrhea, nausea and vomiting.   Musculoskeletal:  Positive for arthralgias (chronic since had covid. 2020).       Objective     Vitals:    10/01/24 1248   BP: 150/78   Pulse: 67   Temp: 97.1 °F (36.2 °C)   SpO2: 98%         10/01/24  1248   Weight: 76.2 kg (168 lb)     Body mass index is 27.53 kg/m².    Physical Exam  Vitals reviewed.   Constitutional:       General: She is not in acute distress.  Cardiovascular:      Rate and Rhythm: Normal rate and regular rhythm.      Heart sounds: Normal heart sounds.   Pulmonary:      Effort: Pulmonary effort is normal.      Breath sounds: Normal breath sounds.   Abdominal:      General: Bowel sounds are normal. There is no distension.      Palpations: Abdomen is soft.      Tenderness: There is no abdominal tenderness.   Skin:     General: Skin is warm and dry.   Neurological:      Mental Status: She is alert.         Imaging Results (Most Recent)       None            Assessment & Plan     Diagnoses and all orders for this visit:    1. Family hx of colon cancer (Primary)    2. Family hx colonic polyps      She is asymptomatic. I explained the importance of having colonoscopy to try to prevent colon cancer. She verbalizes understanding however declines having  colonoscopy. She will contact us if changes her mind.                * Surgery not found *  All risks, benefits, alternatives, and indications of colonoscopy procedure have been discussed with the patient. Risks to include perforation of the colon requiring possible surgery or colostomy, risk of bleeding from biopsies or removal of colon tissue, possibility of missing a colon polyp or cancer, or adverse drug reaction.  Benefits to include the diagnosis and management of disease of the colon and rectum. Alternatives to include barium enema, radiographic evaluation, lab testing or no intervention. Pt verbalizes understanding and agrees.     There are no Patient Instructions on file for this visit.    DESHAWN Michaud

## 2024-10-03 ENCOUNTER — OFFICE VISIT (OUTPATIENT)
Dept: PULMONOLOGY | Facility: CLINIC | Age: 79
End: 2024-10-03
Payer: MEDICARE

## 2024-10-03 VITALS
HEIGHT: 66 IN | DIASTOLIC BLOOD PRESSURE: 72 MMHG | OXYGEN SATURATION: 98 % | BODY MASS INDEX: 26.36 KG/M2 | HEART RATE: 92 BPM | SYSTOLIC BLOOD PRESSURE: 124 MMHG | WEIGHT: 164 LBS

## 2024-10-03 DIAGNOSIS — E66.3 OVERWEIGHT: Chronic | ICD-10-CM

## 2024-10-03 DIAGNOSIS — R91.1 LUNG NODULE: Chronic | ICD-10-CM

## 2024-10-03 DIAGNOSIS — U09.9 POST-COVID SYNDROME: Chronic | ICD-10-CM

## 2024-10-03 DIAGNOSIS — Z87.891 PERSONAL HISTORY OF NICOTINE DEPENDENCE: Chronic | ICD-10-CM

## 2024-10-03 DIAGNOSIS — J45.30 MILD PERSISTENT ASTHMA WITHOUT COMPLICATION: Primary | Chronic | ICD-10-CM

## 2024-10-03 RX ORDER — ALBUTEROL SULFATE 90 UG/1
2 INHALANT RESPIRATORY (INHALATION) EVERY 4 HOURS PRN
Qty: 25.5 G | Refills: 3 | Status: SHIPPED | OUTPATIENT
Start: 2024-10-03

## 2024-10-03 NOTE — PROGRESS NOTES
Chief Complaint  Mild persistent asthma without complication    Subjective    History of Present Illness {CC  Problem List  Visit Diagnosis   Encounters  Notes  Medications  Labs  Result Review Imaging  Media: 23}    Thania Casiano presents to [facility] for asthma.    History of Present Illness   The patient states she is doing reasonly well from the standpoint of her asthma.  She rarely has to use her rescue inhaler but does need a new prescription as her current rescue inhalers have  I did send in a new prescription.  She is on the Wixela Inhub for maintenance therapy and states it is effective but she does not care that much for the dry powder but states she will continue it for now.  If that got to be more of an issue in the future particularly upper airway problems such as hoarseness or oral thrush we could try to switch her to a metered-dose inhaler and I did advise her this would include agent such as Advair HFA, Dulera, and Symbicort and in particular likely the generic form of Symbicort which is Brenya.  Again for now she will continue her current regimen.  I told her we will just plan on continuing yearly visits unless she has any acute issues with worsening of her asthma symptoms in the interim and she is in agreement with this plan.  She does have a history of COVID inserted may have a component of long COVID and does try to do breathing exercise to help the respiratory symptoms associated with this.  She also received a letter from [facility] regarding her history of lung nodules.  She did have some groundglass changes related to COVID on a prior CT which improved on a subsequent CT.  She has had a small 4 to 5 mm left lower lobe subpleural nodule noted on previous scans which have been present dating back to  and did not change significantly on subsequent scans through  so I advised her this would not  require any follow-up and she expressed understanding.  She has had the COVID-19 vaccine including a booster in the form of the Moderna vaccine.  She had a flu shot this past flu season as well.  Prior to Admission medications    Medication Sig Start Date End Date Taking? Authorizing Provider   acetaminophen (TYLENOL) 500 MG tablet Take 1 tablet by mouth 2 (Two) Times a Day.   Yes Malcolm Crowell MD   albuterol sulfate HFA (ProAir HFA) 108 (90 Base) MCG/ACT inhaler Inhale 2 puffs Every 4 (Four) Hours As Needed for Wheezing or Shortness of Air. 10/3/24  Yes Natan Hughes MD   buPROPion XL (WELLBUTRIN XL) 300 MG 24 hr tablet TAKE 1 TABLET DAILY 5/6/24  Yes Feliz Tirado MD   celecoxib (CeleBREX) 200 MG capsule TAKE 1 CAPSULE DAILY 8/15/24  Yes Ngozi Medina APRN   Ergocalciferol (VITAMIN D2 PO) Take 1,000 Units by mouth Daily.   Yes Malcolm Crowell MD   LUMIGAN 0.01 % ophthalmic drops  12/14/17  Yes Malcolm Crowell MD   multivitamin with minerals tablet tablet Take 1 tablet by mouth Daily.   Yes Malcolm Crowell MD   spironolactone (ALDACTONE) 25 MG tablet TAKE 1 TABLET DAILY 9/23/24  Yes Feliz Tirado MD   Synthroid 100 MCG tablet TAKE 1 TABLET DAILY ON AN EMPTY STOMACH 6/3/24  Yes Feliz Tirado MD   theophylline (THEODUR) 300 MG 12 hr tablet TAKE 1 TABLET TWICE A DAY 5/6/24  Yes Natan Hughes MD   Wixela Inhub 100-50 MCG/ACT DISKUS USE 1 INHALATION TWICE A DAY 3/11/24  Yes Natan Hughes MD   albuterol sulfate HFA (ProAir HFA) 108 (90 Base) MCG/ACT inhaler Inhale 2 puffs Every 4 (Four) Hours As Needed for Wheezing. 11/4/22 10/3/24 Yes Denise Quan APRN   amoxicillin-clavulanate (AUGMENTIN) 875-125 MG per tablet Take 1 tablet by mouth 2 (Two) Times a Day.  Patient not taking: Reported on 10/1/2024 9/24/24   Feliz Tirado MD   loratadine (CLARITIN) 10 MG tablet TAKE 1 TABLET DAILY  Patient not taking: Reported on 10/3/2024 6/3/24    "Feliz Tirado MD   Turmeric 500 MG capsule Take 1 capsule by mouth Daily.  Patient not taking: Reported on 10/1/2024    Provider, MD Malcolm       Social History     Socioeconomic History    Marital status:    Tobacco Use    Smoking status: Former     Current packs/day: 0.00     Average packs/day: 1 pack/day for 25.0 years (25.0 ttl pk-yrs)     Types: Cigarettes     Start date:      Quit date: 1980     Years since quittin.7    Smokeless tobacco: Never    Tobacco comments:     off and on    Vaping Use    Vaping status: Never Used   Substance and Sexual Activity    Alcohol use: Not Currently    Drug use: No    Sexual activity: Not Currently     Partners: Male     Birth control/protection: Surgical       Objective   Vital Signs:   /72   Pulse 92   Ht 166.4 cm (65.5\")   Wt 74.4 kg (164 lb)   SpO2 98% Comment: ra  BMI 26.88 kg/m²     Physical Exam  Vitals and nursing note reviewed.   Constitutional:       Comments: Her BMI is minimally elevated.   HENT:      Head: Normocephalic.   Eyes:      Extraocular Movements: Extraocular movements intact.      Pupils: Pupils are equal, round, and reactive to light.   Cardiovascular:      Rate and Rhythm: Normal rate and regular rhythm.   Pulmonary:      Effort: Pulmonary effort is normal.      Comments: Lung fields are clear with good air movement bilaterally and no adventitious sounds are heard.  Musculoskeletal:         General: Normal range of motion.   Skin:     General: Skin is warm and dry.   Neurological:      General: No focal deficit present.      Mental Status: She is alert and oriented to person, place, and time.   Psychiatric:         Mood and Affect: Mood normal.         Behavior: Behavior normal.        Result Review :          Results for orders placed in visit on 22    Full Pulmonary Function Test Without Bronchodilator    Narrative  Full Pulmonary Function Test Without Bronchodilator  Performed by: David Romano, " CMA  Authorized by: Natan Hughes MD    Pre Drug % Predicted  FVC: 88%  FEV1: 88%  FEF 25-75%: 88%  FEV1/FVC: 77%  T%  RV: 112%  DLCO: 133%  D/VAsb: 124%    Interpretation  Spirometry  Spirometry shows normal results.  Diffusion Capacity  The patient's diffusion capacity is normal.  Diffusion capacity is normal when corrected for alveolar volume.  Overall comments: The patient's spirometry is within normal limits.  Lung volumes are within normal limits with the exception of a decrease in expiratory reserve volume.  Diffusion capacity is within normal limits when current studies are compared to [facility] from [facility] 10, 2018, there is no change in her FVC and a minimal but not significant drop in her FEV1 compared to previous.      Results for orders placed in visit on 12/10/18    Pulmonary Function Test                 My interpretation of imaging:    CT Chest Without Contrast Diagnostic (2022 08:55)   CT Angiogram Chest (2021 16:01)       Assessment and Plan {CC Problem List  Visit Diagnosis  ROS  Review (Popup)  Health Maintenance  Quality  BestPractice  Medications  SmartSets  SnapShot Encounters  Media : 23}    Diagnoses and all orders for this visit:    1. Mild persistent asthma without complication (Primary)  Assessment & Plan:  Again she states Wixela is effective in treating her asthma symptoms although she does not [place] care for the delivery system itself but she will continue this for now.  If she had upper airway issues such as thrush or hoarseness associate with a dry powder inhaler that we could consider transitioning her to a metered-dose inhaler.  She will continue her as needed albuterol HFA and I did E prescribe refills as her current inhalers have  and she also continue her Theodor.          Orders:  -     albuterol sulfate HFA (ProAir HFA) 108 (90 Base) MCG/ACT inhaler; Inhale 2 puffs Every 4 (Four)  Hours As Needed for Wheezing or Shortness of Air.  Dispense: 25.5 g; Refill: 3    2. Lung nodule  Assessment & Plan:  I did review her most recent CT with her and also some of her older CAT scans.  She has a 4 to 5 mm left lower lobe subpleural nodule which had been stable from 2014 through 2022 and should require no follow-up.  She expressed understanding.      3. Post-COVID syndrome  Assessment & Plan:  She likely has a component of long COVID.  She does utilize breathing exercise techniques to help any associated respiratory symptoms.      4. Personal history of nicotine dependence  Assessment & Plan:  She has not smoked since 1980.      5. Overweight  Assessment & Plan:  Patient's (Body mass index is 26.88 kg/m².) indicates that they are overweight with health conditions that include GERD . Weight is unchanged.  Diet and exercise are encouraged and she will follow-up with her primary care physician regarding her minimally elevated BMI otherwise.            Natan Hughes MD  10/3/2024  18:32 CDT    Follow Up   Return in about 1 year (around 10/3/2025) for To see me specifically.    Patient was given instructions and counseling regarding her condition or for health maintenance advice. Please see specific information pulled into the AVS if appropriate.

## 2024-10-03 NOTE — ASSESSMENT & PLAN NOTE
Again she states Wixela is effective in treating her asthma symptoms although she does not [place] care for the delivery system itself but she will continue this for now.  If she had upper airway issues such as thrush or hoarseness associate with a dry powder inhaler that we could consider transitioning her to a metered-dose inhaler.  She will continue her as needed albuterol HFA and I did E prescribe refills as her current inhalers have  and she also continue her Theodor.

## 2024-10-03 NOTE — PATIENT INSTRUCTIONS
The patient overall is doing well from the standpoint of her asthma.  She does state that Wixela does cause some upper airways irritation at times but it does seem to be effective in controlling her symptoms.  I told her if that became more of an issue we could see about transitioning her to a metered-dose inhaler such as Advair HFA, Dulera, and Symbicort including Brenya the generic version.  For now she will continue the Wixela.  She rarely has to use her rescue inhaler but does need a refill and I did E prescribe this.  She does have some long COVID symptoms but states she is working on breathing exercises to help the respiratory component of this problem.  Again I e-prescribed refills of her rescue inhaler and I will see her back in 1 year otherwise.

## 2024-10-03 NOTE — ASSESSMENT & PLAN NOTE
She likely has a component of long COVID.  She does utilize breathing exercise techniques to help any associated respiratory symptoms.

## 2024-10-03 NOTE — ASSESSMENT & PLAN NOTE
I did review her most recent CT with her and also some of her older CAT scans.  She has a 4 to 5 mm left lower lobe subpleural nodule which had been stable from 2014 through 2022 and should require no follow-up.  She expressed understanding.

## 2024-10-03 NOTE — ASSESSMENT & PLAN NOTE
Patient's (Body mass index is 26.88 kg/m².) indicates that they are overweight with health conditions that include GERD . Weight is unchanged.  Diet and exercise are encouraged and she will follow-up with her primary care physician regarding her minimally elevated BMI otherwise.

## 2024-10-14 ENCOUNTER — OFFICE VISIT (OUTPATIENT)
Dept: INTERNAL MEDICINE | Facility: CLINIC | Age: 79
End: 2024-10-14
Payer: MEDICARE

## 2024-10-14 VITALS
HEART RATE: 62 BPM | SYSTOLIC BLOOD PRESSURE: 114 MMHG | HEIGHT: 66 IN | DIASTOLIC BLOOD PRESSURE: 62 MMHG | TEMPERATURE: 98.1 F | BODY MASS INDEX: 26.68 KG/M2 | WEIGHT: 166 LBS | OXYGEN SATURATION: 97 %

## 2024-10-14 DIAGNOSIS — Z23 NEED FOR INFLUENZA VACCINATION: ICD-10-CM

## 2024-10-14 DIAGNOSIS — E03.9 HYPOTHYROIDISM (ACQUIRED): ICD-10-CM

## 2024-10-14 DIAGNOSIS — J30.89 NON-SEASONAL ALLERGIC RHINITIS, UNSPECIFIED TRIGGER: Primary | ICD-10-CM

## 2024-10-14 PROCEDURE — 3074F SYST BP LT 130 MM HG: CPT | Performed by: INTERNAL MEDICINE

## 2024-10-14 PROCEDURE — 3078F DIAST BP <80 MM HG: CPT | Performed by: INTERNAL MEDICINE

## 2024-10-14 PROCEDURE — G0008 ADMIN INFLUENZA VIRUS VAC: HCPCS | Performed by: INTERNAL MEDICINE

## 2024-10-14 PROCEDURE — 90662 IIV NO PRSV INCREASED AG IM: CPT | Performed by: INTERNAL MEDICINE

## 2024-10-14 PROCEDURE — 1125F AMNT PAIN NOTED PAIN PRSNT: CPT | Performed by: INTERNAL MEDICINE

## 2024-10-14 PROCEDURE — 99214 OFFICE O/P EST MOD 30 MIN: CPT | Performed by: INTERNAL MEDICINE

## 2024-10-14 RX ORDER — NYSTATIN 100000 [USP'U]/ML
500000 SUSPENSION ORAL 4 TIMES DAILY
Qty: 473 ML | Refills: 0 | Status: SHIPPED | OUTPATIENT
Start: 2024-10-14

## 2024-10-14 RX ORDER — MONTELUKAST SODIUM 10 MG/1
10 TABLET ORAL NIGHTLY
Qty: 30 TABLET | Refills: 2 | Status: SHIPPED | OUTPATIENT
Start: 2024-10-14

## 2024-10-15 LAB
ALBUMIN SERPL-MCNC: 4.3 G/DL (ref 3.5–5.2)
ALBUMIN/GLOB SERPL: 2.2 G/DL
ALP SERPL-CCNC: 145 U/L (ref 39–117)
ALT SERPL-CCNC: 20 U/L (ref 1–33)
AST SERPL-CCNC: 32 U/L (ref 1–32)
BILIRUB SERPL-MCNC: 0.3 MG/DL (ref 0–1.2)
BUN SERPL-MCNC: 21 MG/DL (ref 8–23)
BUN/CREAT SERPL: 23.3 (ref 7–25)
CALCIUM SERPL-MCNC: 9.2 MG/DL (ref 8.6–10.5)
CHLORIDE SERPL-SCNC: 102 MMOL/L (ref 98–107)
CO2 SERPL-SCNC: 25.8 MMOL/L (ref 22–29)
CREAT SERPL-MCNC: 0.9 MG/DL (ref 0.57–1)
EGFRCR SERPLBLD CKD-EPI 2021: 65.6 ML/MIN/1.73
GLOBULIN SER CALC-MCNC: 2 GM/DL
GLUCOSE SERPL-MCNC: 81 MG/DL (ref 65–99)
POTASSIUM SERPL-SCNC: 4.1 MMOL/L (ref 3.5–5.2)
PROT SERPL-MCNC: 6.3 G/DL (ref 6–8.5)
SODIUM SERPL-SCNC: 138 MMOL/L (ref 136–145)
TSH SERPL DL<=0.005 MIU/L-ACNC: 2.49 UIU/ML (ref 0.27–4.2)

## 2024-10-16 NOTE — PROGRESS NOTES
"      Chief Complaint  Sore Throat (Antibiotic did not work, throat still sore, only on the right.)    Subjective        Thania Casiano presents to [facility]    HPI    Patient here for the above problems.  See Assessment and Plan for further HPI components.      Review of Systems    Objective   Vital Signs:  /62 (BP Location: Left arm, Patient Position: Sitting, Cuff Size: Adult)   Pulse 62   Temp 98.1 °F (36.7 °C) (Temporal)   Ht 166.4 cm (65.51\")   Wt 75.3 kg (166 lb)   SpO2 97%   BMI 27.19 kg/m²   Estimated body mass index is 27.19 kg/m² as calculated from the following:    Height as of this encounter: 166.4 cm (65.51\").    Weight as of this encounter: 75.3 kg (166 lb).      Physical Exam  Vitals and nursing note reviewed.   Constitutional:       Appearance: She is not ill-appearing.   HENT:      Mouth/Throat:      Pharynx: Posterior oropharyngeal erythema present.   Eyes:      General: No scleral icterus.     Conjunctiva/sclera: Conjunctivae normal.   Neck:      Comments: Palpable lymph node on left and right of neck, right > left  Pulmonary:      Effort: Pulmonary effort is normal. No respiratory distress.   Skin:     General: Skin is warm.      Coloration: Skin is not pale.   Neurological:      General: No focal deficit present.      Mental Status: She is alert and oriented to person, place, and time.   Psychiatric:         Mood and Affect: Mood normal.         Behavior: Behavior normal.                       Assessment and Plan   Diagnoses and all orders for this visit:    1. Non-seasonal allergic rhinitis, unspecified trigger (Primary)    2. Hypothyroidism (acquired)  -     TSH Rfx On Abnormal To Free T4  -     Comprehensive metabolic panel    3. Need for influenza vaccination  -     Fluzone High-Dose 65+yrs    Other orders  -     montelukast (Singulair) 10 MG tablet; Take 1 tablet by mouth Every Night.  Dispense: 30 tablet; Refill: 2  -     nystatin " "(MYCOSTATIN) 100,000 unit/mL suspension; Swish and swallow 5 mL 4 (Four) Times a Day.  Dispense: 473 mL; Refill: 0        She still has a small palpable lymph node on the anterior cervical chain.  This was ultrasounded at last visit and radiologist read as a \"small normal size lymph node with no cortical thickening\".  She has a friend that had difficult to diagnose thyroid cancer and this has been worrying her that she has a thyroid cancer.  This is not close to the thyroid gland and is similar side to the left side.  It is soft and mobile.    Patient reports the sore throat has been worse since they switched her inhaler.  She declines nasal sprays as she was told to never use these by her eye doctor, although her wixela has some active ingredient as the nasal spray.  Will trial singulair in addition to her antihistamine to see if can dry it up.  Will also trial nystatin swish and swallow since the symptoms started and worsened after changing steroid inhaler.  She does report that she rinses after each use.      She asked about an MRI of her thyroid.  This is not common practice and do not think it would yield anything of use based on completely negative US.      Labs as above.     Result Review :                               Follow Up   No follow-ups on file.  Patient was given instructions and counseling regarding her condition or for health maintenance advice. Please see specific information pulled into the AVS if appropriate.       KIARA Tirado MD, FACP, [facility]      Electronically signed by Feliz Tirado MD, 10/16/24, 8:02 AM CDT.          "

## 2024-12-27 ENCOUNTER — HOSPITAL ENCOUNTER (EMERGENCY)
Facility: HOSPITAL | Age: 79
Discharge: HOME | End: 2024-12-27
Payer: MEDICARE

## 2024-12-27 ENCOUNTER — TELEPHONE (OUTPATIENT)
Dept: INTERNAL MEDICINE | Facility: CLINIC | Age: 79
End: 2024-12-27

## 2024-12-27 ENCOUNTER — APPOINTMENT (OUTPATIENT)
Dept: GENERAL RADIOLOGY | Facility: HOSPITAL | Age: 79
End: 2024-12-27
Payer: MEDICARE

## 2024-12-27 VITALS
BODY MASS INDEX: 27.32 KG/M2 | OXYGEN SATURATION: 98 % | SYSTOLIC BLOOD PRESSURE: 137 MMHG | RESPIRATION RATE: 20 BRPM | HEART RATE: 83 BPM | TEMPERATURE: 98.2 F | HEIGHT: 66 IN | WEIGHT: 170 LBS | DIASTOLIC BLOOD PRESSURE: 69 MMHG

## 2024-12-27 DIAGNOSIS — J98.8 VIRAL RESPIRATORY ILLNESS: Primary | ICD-10-CM

## 2024-12-27 DIAGNOSIS — B97.89 VIRAL RESPIRATORY ILLNESS: Primary | ICD-10-CM

## 2024-12-27 LAB
ALBUMIN SERPL-MCNC: 4.1 G/DL (ref 3.5–5.2)
ALBUMIN/GLOB SERPL: 1.4 G/DL
ALP SERPL-CCNC: 107 U/L (ref 39–117)
ALT SERPL W P-5'-P-CCNC: 18 U/L (ref 1–33)
ANION GAP SERPL CALCULATED.3IONS-SCNC: 13 MMOL/L (ref 5–15)
AST SERPL-CCNC: 28 U/L (ref 1–32)
BASOPHILS # BLD AUTO: 0.02 10*3/MM3 (ref 0–0.2)
BASOPHILS NFR BLD AUTO: 0.4 % (ref 0–1.5)
BILIRUB SERPL-MCNC: 0.4 MG/DL (ref 0–1.2)
BUN SERPL-MCNC: 21 MG/DL (ref 8–23)
BUN/CREAT SERPL: 22.1 (ref 7–25)
CALCIUM SPEC-SCNC: 9.6 MG/DL (ref 8.6–10.5)
CHLORIDE SERPL-SCNC: 103 MMOL/L (ref 98–107)
CO2 SERPL-SCNC: 22 MMOL/L (ref 22–29)
CREAT SERPL-MCNC: 0.95 MG/DL (ref 0.57–1)
CRP SERPL-MCNC: 8.99 MG/DL (ref 0–0.5)
D DIMER PPP FEU-MCNC: 0.53 MCGFEU/ML (ref 0–0.78)
DEPRECATED RDW RBC AUTO: 43.5 FL (ref 37–54)
EGFRCR SERPLBLD CKD-EPI 2021: 61.5 ML/MIN/1.73
EOSINOPHIL # BLD AUTO: 0.02 10*3/MM3 (ref 0–0.4)
EOSINOPHIL NFR BLD AUTO: 0.4 % (ref 0.3–6.2)
ERYTHROCYTE [DISTWIDTH] IN BLOOD BY AUTOMATED COUNT: 12.6 % (ref 12.3–15.4)
FLUAV RNA RESP QL NAA+PROBE: NOT DETECTED
FLUBV RNA RESP QL NAA+PROBE: NOT DETECTED
GEN 5 1HR TROPONIN T REFLEX: 24 NG/L
GLOBULIN UR ELPH-MCNC: 2.9 GM/DL
GLUCOSE SERPL-MCNC: 115 MG/DL (ref 65–99)
HCT VFR BLD AUTO: 40.3 % (ref 34–46.6)
HGB BLD-MCNC: 13.4 G/DL (ref 12–15.9)
IMM GRANULOCYTES # BLD AUTO: 0 10*3/MM3 (ref 0–0.05)
IMM GRANULOCYTES NFR BLD AUTO: 0 % (ref 0–0.5)
LYMPHOCYTES # BLD AUTO: 1.07 10*3/MM3 (ref 0.7–3.1)
LYMPHOCYTES NFR BLD AUTO: 19.6 % (ref 19.6–45.3)
MCH RBC QN AUTO: 31.6 PG (ref 26.6–33)
MCHC RBC AUTO-ENTMCNC: 33.3 G/DL (ref 31.5–35.7)
MCV RBC AUTO: 95 FL (ref 79–97)
MONOCYTES # BLD AUTO: 0.89 10*3/MM3 (ref 0.1–0.9)
MONOCYTES NFR BLD AUTO: 16.3 % (ref 5–12)
NEUTROPHILS NFR BLD AUTO: 3.46 10*3/MM3 (ref 1.7–7)
NEUTROPHILS NFR BLD AUTO: 63.3 % (ref 42.7–76)
NRBC BLD AUTO-RTO: 0 /100 WBC (ref 0–0.2)
NT-PROBNP SERPL-MCNC: 182.9 PG/ML (ref 0–1800)
PLATELET # BLD AUTO: 187 10*3/MM3 (ref 140–450)
PMV BLD AUTO: 11.3 FL (ref 6–12)
POTASSIUM SERPL-SCNC: 4 MMOL/L (ref 3.5–5.2)
PROT SERPL-MCNC: 7 G/DL (ref 6–8.5)
QT INTERVAL: 382 MS
QTC INTERVAL: 451 MS
RBC # BLD AUTO: 4.24 10*6/MM3 (ref 3.77–5.28)
RSV RNA RESP QL NAA+PROBE: NOT DETECTED
SARS-COV-2 RNA RESP QL NAA+PROBE: NOT DETECTED
SODIUM SERPL-SCNC: 138 MMOL/L (ref 136–145)
TROPONIN T % DELTA: -8 %
TROPONIN T NUMERIC DELTA: -2 NG/L
TROPONIN T SERPL HS-MCNC: 26 NG/L
WBC NRBC COR # BLD AUTO: 5.46 10*3/MM3 (ref 3.4–10.8)

## 2024-12-27 PROCEDURE — 93005 ELECTROCARDIOGRAM TRACING: CPT

## 2024-12-27 PROCEDURE — 36415 COLL VENOUS BLD VENIPUNCTURE: CPT

## 2024-12-27 PROCEDURE — 87637 SARSCOV2&INF A&B&RSV AMP PRB: CPT | Performed by: NURSE PRACTITIONER

## 2024-12-27 PROCEDURE — 83880 ASSAY OF NATRIURETIC PEPTIDE: CPT | Performed by: NURSE PRACTITIONER

## 2024-12-27 PROCEDURE — 86140 C-REACTIVE PROTEIN: CPT | Performed by: NURSE PRACTITIONER

## 2024-12-27 PROCEDURE — 99284 EMERGENCY DEPT VISIT MOD MDM: CPT

## 2024-12-27 PROCEDURE — 85379 FIBRIN DEGRADATION QUANT: CPT | Performed by: NURSE PRACTITIONER

## 2024-12-27 PROCEDURE — 71045 X-RAY EXAM CHEST 1 VIEW: CPT

## 2024-12-27 PROCEDURE — 84484 ASSAY OF TROPONIN QUANT: CPT | Performed by: NURSE PRACTITIONER

## 2024-12-27 PROCEDURE — 96374 THER/PROPH/DIAG INJ IV PUSH: CPT

## 2024-12-27 PROCEDURE — 85025 COMPLETE CBC W/AUTO DIFF WBC: CPT | Performed by: NURSE PRACTITIONER

## 2024-12-27 PROCEDURE — 80053 COMPREHEN METABOLIC PANEL: CPT | Performed by: NURSE PRACTITIONER

## 2024-12-27 PROCEDURE — 25010000002 DEXAMETHASONE PER 1 MG: Performed by: NURSE PRACTITIONER

## 2024-12-27 RX ORDER — DEXAMETHASONE SODIUM PHOSPHATE 10 MG/ML
6 INJECTION INTRAMUSCULAR; INTRAVENOUS ONCE
Status: COMPLETED | OUTPATIENT
Start: 2024-12-27 | End: 2024-12-27

## 2024-12-27 RX ORDER — GUAIFENESIN 600 MG/1
600 TABLET, EXTENDED RELEASE ORAL 2 TIMES DAILY
Qty: 20 TABLET | Refills: 0 | Status: SHIPPED | OUTPATIENT
Start: 2024-12-27 | End: 2025-01-06

## 2024-12-27 RX ORDER — METHYLPREDNISOLONE 4 MG/1
TABLET ORAL
Qty: 21 TABLET | Refills: 0 | Status: SHIPPED | OUTPATIENT
Start: 2024-12-27

## 2024-12-27 RX ORDER — SODIUM CHLORIDE 0.9 % (FLUSH) 0.9 %
10 SYRINGE (ML) INJECTION AS NEEDED
Status: DISCONTINUED | OUTPATIENT
Start: 2024-12-27 | End: 2024-12-27 | Stop reason: HOSPADM

## 2024-12-27 RX ADMIN — DEXAMETHASONE SODIUM PHOSPHATE 6 MG: 10 INJECTION INTRAMUSCULAR; INTRAVENOUS at 12:39

## 2024-12-27 NOTE — ED PROVIDER NOTES
Subjective   History of Present Illness  79 yof with PMH of glaucoma, thyroid disease, HTN, asthma and GERD presents with c/o head congestion and cough. She reports she is a long haul covid patient as has a PMH of asthma and states she is experiencing some shortness of breath as well.  She denies CP.  She denies fever.  She states her  is admitted upstairs with similar respiratory symptoms.  She denies n/v/d.  No weakness or dizziness.        Review of Systems   Constitutional:  Negative for activity change, appetite change, fatigue and fever.   HENT:  Negative for congestion, ear pain, facial swelling and sore throat.    Eyes:  Negative for discharge and visual disturbance.   Respiratory:  Positive for cough and shortness of breath. Negative for apnea, chest tightness, wheezing and stridor.    Cardiovascular:  Negative for chest pain and palpitations.   Gastrointestinal:  Negative for abdominal distention, abdominal pain, diarrhea, nausea and vomiting.   Genitourinary:  Negative for difficulty urinating and dysuria.   Musculoskeletal:  Negative for arthralgias and myalgias.   Skin:  Negative for rash and wound.   Neurological:  Negative for dizziness and seizures.   Psychiatric/Behavioral:  Negative for agitation and confusion.        Past Medical History:   Diagnosis Date    Anxiety     Arthritis     Asthma     COVID-19 05/2022    POST COMPLICATIONS FROM COVID 19 2022    Depression     GERD (gastroesophageal reflux disease)     HAS SUBSIDED FOR NOW    Glaucoma     H. pylori infection     Hypertension     Increased intraocular pressure     Migraine     Peptic ulcer     Polymyalgia     Polymyalgia rheumatica     PONV (postoperative nausea and vomiting)     Thyroid disease        No Known Allergies    Past Surgical History:   Procedure Laterality Date    APPENDECTOMY      BACK SURGERY      BREAST BIOPSY      CARPAL TUNNEL RELEASE Bilateral     CATARACT EXTRACTION Bilateral     CERVICAL SPINAL CORD TUMOR  REMOVAL Right 2018    Procedure: CERVICAL SPINAL TUMOR REMOVAL RIGHT;  Surgeon: Yoav Palencia MD;  Location: [facility] OR;  Service:     CHOLECYSTECTOMY      COLONOSCOPY  2015    COLONOSCOPY N/A 2019    Procedure: COLONOSCOPY WITH ANESTHESIA;  Surgeon: Bryan Warner MD;  Location: [facility] ENDOSCOPY;  Service: Gastroenterology    ENDOSCOPY  2010    ENDOSCOPY N/A 2019    Procedure: ESOPHAGOGASTRODUODENOSCOPY WITH ANESTHESIA;  Surgeon: Bryan Warner MD;  Location: [facility] ENDOSCOPY;  Service: Gastroenterology    ENDOSCOPY N/A 2021    Procedure: ESOPHAGOGASTRODUODENOSCOPY WITH ANESTHESIA;  Surgeon: Bryan Warner MD;  Location: [facility] ENDOSCOPY;  Service: Gastroenterology;  Laterality: N/A;  preop; nausea  postop: small bowel diverticulum,  PCP Davon Ashley     GALLBLADDER SURGERY      HYSTERECTOMY      TONSILLECTOMY      TOTAL SHOULDER ARTHROPLASTY W/ DISTAL CLAVICLE EXCISION Left 3/28/2023    Procedure: LEFT REVERSE TOTAL SHOULDER ARTHROPLASTY;  Surgeon: Roger Akhtar MD;  Location: [facility] OR;  Service: Orthopedics;  Laterality: Left;       Family History   Problem Relation Age of Onset    Colon polyps Father     Colon cancer Other     Colon cancer Paternal Uncle     Colon cancer Paternal Uncle        Social History     Socioeconomic History    Marital status:    Tobacco Use    Smoking status: Former     Current packs/day: 0.00     Average packs/day: 1 pack/day for 25.0 years (25.0 ttl pk-yrs)     Types: Cigarettes     Start date:      Quit date: 1980     Years since quittin.0    Smokeless tobacco: Never    Tobacco comments:     off and on    Vaping Use    Vaping status: Never Used   Substance and Sexual Activity    Alcohol use: Not Currently    Drug use: No    Sexual activity: Not Currently     Partners: Male     Birth control/protection: Surgical           Objective   Physical Exam  Vitals and nursing note reviewed.   Constitutional:        General: She is not in acute distress.     Appearance: She is well-developed. She is not ill-appearing or toxic-appearing.   HENT:      Head: Normocephalic.   Eyes:      Pupils: Pupils are equal, round, and reactive to light.   Cardiovascular:      Rate and Rhythm: Normal rate and regular rhythm.      Heart sounds: No murmur heard.  Pulmonary:      Effort: Pulmonary effort is normal.      Breath sounds: Normal breath sounds. No decreased breath sounds, wheezing, rhonchi or rales.   Abdominal:      General: Bowel sounds are normal.      Palpations: Abdomen is soft.   Musculoskeletal:         General: Normal range of motion.      Cervical back: Normal range of motion and neck supple.   Skin:     General: Skin is warm and dry.   Neurological:      Mental Status: She is alert and oriented to person, place, and time.         Procedures           ED Course  ED Course as of 12/27/24 1910   Fri Dec 27, 2024   1258 CXR - IMPRESSION: No acute abnormality.                                                              [KS]   1501 Lab work reviewed.  CRP slightly elevated.  Troponin elevated at 26 followed by repeat of 24.  Covid, flu and RSV neg. Remaining lab work unremarkable.  She has denied CP.  On reassessment, she reports she is feeling better.  I will discharge her on oral steroids and medications as she wants to go upstairs and stay with her  who is an inpatient.  She and her friend at  voiced understanding of results and instructions including strict return precautions.  [KS]      ED Course User Index  [KS] Opal Arechiga APRN                                                       Medical Decision Making  79 yof with PMH of glaucoma, thyroid disease, HTN, asthma and GERD presents with c/o head congestion and cough. She reports she is a long haul covid patient as has a PMH of asthma and states she is experiencing some shortness of breath as well.  She denies CP.  She denies fever.  She states her   is admitted upstairs with similar respiratory symptoms.  She denies n/v/d.  No weakness or dizziness.      CXR - IMPRESSION: No acute abnormality.               Lab work reviewed.  CRP slightly elevated.  Troponin elevated at 26 followed by repeat of 24.  Covid, flu and RSV neg. Remaining lab work unremarkable.  She has denied CP.  On reassessment, she reports she is feeling better.  I will discharge her on oral steroids and medications as she wants to go upstairs and stay with her  who is an inpatient.  She and her friend at  voiced understanding of results and instructions including strict return precautions.          Problems Addressed:  Viral respiratory illness: complicated acute illness or injury    Amount and/or Complexity of Data Reviewed  Labs: ordered.  Radiology: ordered.    Risk  OTC drugs.  Prescription drug management.        Final diagnoses:   Viral respiratory illness       ED Disposition  ED Disposition       ED Disposition   Discharge    Condition   Stable    Comment   --               Feliz Tirado MD  [address]  [facility] [id number] [phone]    Call in 3 days  Routine ED follow up         Medication List        New Prescriptions      guaiFENesin 600 MG 12 hr tablet  Commonly known as: MUCINEX  Take 1 tablet by mouth 2 (Two) Times a Day for 10 days.     methylPREDNISolone 4 MG dose pack  Commonly known as: MEDROL  Take as directed on package instructions.               Where to Get Your Medications        These medications were sent to [facility] [place] - [address] - [phone]  - [phone]   [address] [address]      Phone: [phone]   guaiFENesin 600 MG 12 hr tablet  methylPREDNISolone 4 MG dose pack            Opal Arechiga, DESHAWN  12/27/24 1910

## 2024-12-27 NOTE — DISCHARGE INSTRUCTIONS
Drink plenty of fluid. Rest.  Medication as ordered.  Tylenol as needed for pain/fever.  Follow up with PCP -call Monday for appointment. Return to ED if condition does not improve or worsens

## 2024-12-27 NOTE — TELEPHONE ENCOUNTER
Called and spoke with pt - went back over symptoms with her and advised to go to walk in clinic to get checked out and listened to- she is hesitant bc of issues her  had.

## 2024-12-27 NOTE — TELEPHONE ENCOUNTER
"  Caller: Thania Casiano    Relationship to patient: Self    Best call back number: [phone]     Chief complaint: COUGH AND STRUGGLING TO BREATHE     Patient directed to call 911 or go to their nearest emergency room.     Patient verbalized understanding: [] Yes  [x] No  If no, why?    Additional notes:PATIENT STATED SHE WAS HAVING THE SAME RESPIRATORY SYMPTOMS HER  ENDED UP IN THE HOSPITAL FOR.     I ASKED WHAT SYMPTOMS AND SHE STATED COUGH(PHLEGM IS TRYING TO COME UP) AND STRUGGLING TO BREATHE. I THEN ASKED WAS SHE STRUGGLING TO BREATHE AT REST OR AFTER MOVEMENT, SHE STATED IT WAS ALL THE TIME.    I TOLD THE PATIENT THAT SINCE SHE WAS STRUGGLING TO BREATHE ALL THE TIME I HAVE TO ADVISE HER TO GO TO HER NEAREST EMERGENCY ROOM OR DIAL 911. I ASKED WOULD SHE BE WILLING TO GO AND SHE SAID \"NO, JUST FORGET ABOUT IT\" AND DISCONNECTED THE CALL.  "

## 2025-01-09 LAB
QT INTERVAL: 382 MS
QTC INTERVAL: 451 MS

## 2025-01-28 ENCOUNTER — TELEPHONE (OUTPATIENT)
Dept: INTERNAL MEDICINE | Facility: CLINIC | Age: 80
End: 2025-01-28
Payer: MEDICARE

## 2025-01-28 NOTE — TELEPHONE ENCOUNTER
Called to r/s patient- she has had a positive home covid test as well- she would like medication sent in

## 2025-03-06 DIAGNOSIS — F43.23 SITUATIONAL MIXED ANXIETY AND DEPRESSIVE DISORDER: ICD-10-CM

## 2025-03-06 DIAGNOSIS — J45.30 MILD PERSISTENT ASTHMA WITHOUT COMPLICATION: ICD-10-CM

## 2025-03-06 RX ORDER — FLUTICASONE PROPIONATE AND SALMETEROL 100; 50 UG/1; UG/1
1 POWDER RESPIRATORY (INHALATION) 2 TIMES DAILY
Qty: 600 EACH | Refills: 3 | Status: SHIPPED | OUTPATIENT
Start: 2025-03-06

## 2025-03-06 RX ORDER — BUPROPION HYDROCHLORIDE 300 MG/1
300 TABLET ORAL DAILY
Qty: 90 TABLET | Refills: 3 | Status: SHIPPED | OUTPATIENT
Start: 2025-03-06

## 2025-03-06 NOTE — TELEPHONE ENCOUNTER
Rx Refill Note  Requested Prescriptions     Pending Prescriptions Disp Refills    Wixela Inhub 100-50 MCG/ACT DISKUS [Pharmacy Med Name: WIXELA INHUB 60'S 100/50] 180 each 3     Sig: USE 1 INHALATION TWICE A DAY      Last office visit with prescribing clinician: 10/3/2024   Last telemedicine visit with prescribing clinician: Visit date not found   Next office visit with prescribing clinician: 10/3/2025                         Would you like a call back once the refill request has been completed: [] Yes [] No    If the office needs to give you a call back, can they leave a voicemail: [] Yes [] No    Lucía Oneil MA  03/06/25, 08:37 CST

## 2025-03-07 DIAGNOSIS — F43.23 SITUATIONAL MIXED ANXIETY AND DEPRESSIVE DISORDER: ICD-10-CM

## 2025-03-07 RX ORDER — BUSPIRONE HYDROCHLORIDE 5 MG/1
5 TABLET ORAL 2 TIMES DAILY PRN
Qty: 180 TABLET | Refills: 3 | OUTPATIENT
Start: 2025-03-07

## 2025-03-27 ENCOUNTER — TELEPHONE (OUTPATIENT)
Dept: INTERNAL MEDICINE | Facility: CLINIC | Age: 80
End: 2025-03-27
Payer: MEDICARE

## 2025-03-27 ENCOUNTER — OFFICE VISIT (OUTPATIENT)
Dept: INTERNAL MEDICINE | Facility: CLINIC | Age: 80
End: 2025-03-27
Payer: MEDICARE

## 2025-03-27 VITALS
BODY MASS INDEX: 27.32 KG/M2 | TEMPERATURE: 97.4 F | OXYGEN SATURATION: 100 % | SYSTOLIC BLOOD PRESSURE: 142 MMHG | HEIGHT: 66 IN | DIASTOLIC BLOOD PRESSURE: 78 MMHG | WEIGHT: 170 LBS | HEART RATE: 60 BPM

## 2025-03-27 DIAGNOSIS — E66.3 OVERWEIGHT: ICD-10-CM

## 2025-03-27 DIAGNOSIS — F41.9 ANXIETY AND DEPRESSION: ICD-10-CM

## 2025-03-27 DIAGNOSIS — F32.A ANXIETY AND DEPRESSION: ICD-10-CM

## 2025-03-27 DIAGNOSIS — R73.01 IMPAIRED FASTING GLUCOSE: ICD-10-CM

## 2025-03-27 DIAGNOSIS — E03.9 HYPOTHYROIDISM (ACQUIRED): ICD-10-CM

## 2025-03-27 DIAGNOSIS — U09.9 POST-COVID SYNDROME: ICD-10-CM

## 2025-03-27 DIAGNOSIS — I10 BENIGN HYPERTENSION: Primary | ICD-10-CM

## 2025-03-27 NOTE — TELEPHONE ENCOUNTER
"Per Dr. Tirado:    \"Patient did not have her PCV 20 as you found out by calling Carlos.  If she would like to get it she can come by office and get it any time she would like.\"    Called pt and informed and she indicated she would come back by at her convenience.  "

## 2025-03-27 NOTE — PROGRESS NOTES
Subjective   The ABCs of the Annual Wellness Visit  Medicare Wellness Visit      Thania Casiano is a 79 y.o. patient who presents for a Medicare Wellness Visit.    The following portions of the patient's history were reviewed and   updated as appropriate: allergies, current medications, past family history, past medical history, past social history, past surgical history, and problem list.    Compared to one year ago, the patient's physical   health is the same.  Compared to one year ago, the patient's mental   health is the same.    Recent Hospitalizations:  She was not admitted to the hospital during the last year.     Current Medical Providers:  Patient Care Team:  Feliz Tirado MD as PCP - General (Internal Medicine)  Bryan Warner MD as Consulting Physician (Gastroenterology)  Denise Quan APRN as Nurse Practitioner (Pulmonary Disease)  Kirsten Caballero MD as Consulting Physician (Pulmonary Disease)  Natan Hughes MD as Consulting Physician (Pulmonary Disease)    Outpatient Medications Prior to Visit   Medication Sig Dispense Refill    acetaminophen (TYLENOL) 500 MG tablet Take 1 tablet by mouth 2 (Two) Times a Day.      albuterol sulfate HFA (ProAir HFA) 108 (90 Base) MCG/ACT inhaler Inhale 2 puffs Every 4 (Four) Hours As Needed for Wheezing or Shortness of Air. 25.5 g 3    buPROPion XL (WELLBUTRIN XL) 300 MG 24 hr tablet Take 1 tablet by mouth Daily. 90 tablet 3    celecoxib (CeleBREX) 200 MG capsule TAKE 1 CAPSULE DAILY 90 capsule 3    Ergocalciferol (VITAMIN D2 PO) Take 1,000 Units by mouth Daily.      loratadine (CLARITIN) 10 MG tablet TAKE 1 TABLET DAILY 90 tablet 3    LUMIGAN 0.01 % ophthalmic drops       multivitamin with minerals tablet tablet Take 1 tablet by mouth Daily.      spironolactone (ALDACTONE) 25 MG tablet TAKE 1 TABLET DAILY 90 tablet 3    Synthroid 100 MCG tablet TAKE 1 TABLET DAILY ON AN EMPTY STOMACH 90 tablet 3    theophylline (THEODUR) 300  MG 12 hr tablet TAKE 1 TABLET TWICE A  tablet 3    Turmeric 500 MG capsule Take 1 capsule by mouth Daily.      Wixela Inhub 100-50 MCG/ACT DISKUS USE 1 INHALATION TWICE A  each 3    methylPREDNISolone (MEDROL) 4 MG dose pack Take as directed on package instructions. 21 tablet 0    montelukast (Singulair) 10 MG tablet Take 1 tablet by mouth Every Night. 30 tablet 2    nystatin (MYCOSTATIN) 100,000 unit/mL suspension Swish and swallow 5 mL 4 (Four) Times a Day. 473 mL 0     No facility-administered medications prior to visit.     No opioid medication identified on active medication list. I have reviewed chart for other potential  high risk medication/s and harmful drug interactions in the elderly.      Aspirin is not on active medication list.  Aspirin use is not indicated based on review of current medical condition/s. Risk of harm outweighs potential benefits.  .    Patient Active Problem List   Diagnosis    Carpal tunnel syndrome of right wrist    Personal history of nicotine dependence    Neoplasm of uncertain behavior of neck    Non-smoker    Benign neoplasm of neck    Mild persistent asthma without complication    Gastroesophageal reflux disease without esophagitis    Family history of polyps in the colon    Nausea    Constipation    Family hx of colon cancer    Family hx colonic polyps    Breast mass    Overweight    Vitamin D deficiency    Anxiety and depression    Polymyalgia rheumatica    Osteopenia    LAFB (left anterior fascicular block)    Primary insomnia    Impaired fasting glucose    Hypokalemia    Cervical radiculitis    Benign hypertension    Allergic-infective asthma    Lung nodule    Post-COVID syndrome    Hypothyroidism (acquired)    Closed 3-part fracture of proximal humerus with malunion, left    Other specified glaucoma     Advance Care Planning Advance Directive is on file.  ACP discussion was held with the patient during this visit. Patient has an advance directive in EMR which  "is still valid.             Objective   Vitals:    25 0901   BP: 142/78   BP Location: Left arm   Patient Position: Sitting   Cuff Size: Adult   Pulse: 60   Temp: 97.4 °F (36.3 °C)   TempSrc: Temporal   SpO2: 100%   Weight: 77.1 kg (170 lb)   Height: 166.4 cm (65.51\")   PainSc: 0-No pain       Estimated body mass index is 27.85 kg/m² as calculated from the following:    Height as of this encounter: 166.4 cm (65.51\").    Weight as of this encounter: 77.1 kg (170 lb).                Does the patient have evidence of cognitive impairment? No  Lab Results   Component Value Date    CHLPL 166 2025    TRIG 82 2025    HDL 52 2025    LDL 99 2025    VLDL 15 2025    HGBA1C 4.80 2025                                                                                               Health  Risk Assessment    Smoking Status:  Social History     Tobacco Use   Smoking Status Former    Current packs/day: 0.00    Average packs/day: 1 pack/day for 25.0 years (25.0 ttl pk-yrs)    Types: Cigarettes    Start date:     Quit date: 1980    Years since quittin.2   Smokeless Tobacco Never   Tobacco Comments    off and on      Alcohol Consumption:  Social History     Substance and Sexual Activity   Alcohol Use Not Currently    Comment: occ glass of wine       Fall Risk Screen  STEADI Fall Risk Assessment was completed, and patient is at LOW risk for falls.Assessment completed on:3/27/2025    Depression Screening   Little interest or pleasure in doing things? Not at all   Feeling down, depressed, or hopeless? Not at all   PHQ-2 Total Score 0      Health Habits and Functional and Cognitive Screening:      3/27/2025     9:07 AM   Functional & Cognitive Status   Do you have difficulty preparing food and eating? No   Do you have difficulty bathing yourself, getting dressed or grooming yourself? No   Do you have difficulty using the toilet? No   Do you have difficulty moving around from place to place? " No   Do you have trouble with steps or getting out of a bed or a chair? No   Current Diet Well Balanced Diet   Dental Exam Up to date   Eye Exam Up to date   Exercise (times per week) 7 times per week   Current Exercises Include Walking   Do you need help using the phone?  No   Are you deaf or do you have serious difficulty hearing?  No   Do you need help to go to places out of walking distance? No   Do you need help shopping? No   Do you need help preparing meals?  No   Do you need help with housework?  No   Do you need help with laundry? No   Do you need help taking your medications? No   Do you need help managing money? No   Do you ever drive or ride in a car without wearing a seat belt? No   Have you felt unusual stress, anger or loneliness in the last month? Yes   Who do you live with? Spouse   If you need help, do you have trouble finding someone available to you? No   Have you been bothered in the last four weeks by sexual problems? No   Do you have difficulty concentrating, remembering or making decisions? No           Age-appropriate Screening Schedule:  Refer to the list below for future screening recommendations based on patient's age, sex and/or medical conditions. Orders for these recommended tests are listed in the plan section. The patient has been provided with a written plan.    Health Maintenance List  Health Maintenance   Topic Date Due    Pneumococcal Vaccine 50+ (1 of 2 - PCV) Never done    TDAP/TD VACCINES (1 - Tdap) Never done    RSV Vaccine - Adults (1 - 1-dose 75+ series) Never done    COVID-19 Vaccine (4 - 2024-25 season) 09/01/2024    COLORECTAL CANCER SCREENING  09/30/2024    DXA SCAN  06/12/2025    LIPID PANEL  01/21/2026    ANNUAL WELLNESS VISIT  03/27/2026    HEPATITIS C SCREENING  Completed    INFLUENZA VACCINE  Completed    ZOSTER VACCINE  Completed    MAMMOGRAM  Discontinued    HEMOGLOBIN A1C  Discontinued    LUNG CANCER SCREENING  Discontinued                                                                                                                                                  CMS Preventative Services Quick Reference  Risk Factors Identified During Encounter  Immunizations Discussed/Encouraged: Shingrix  Dental Screening Recommended  Vision Screening Recommended    The above risks/problems have been discussed with the patient.  Pertinent information has been shared with the patient in the After Visit Summary.  An After Visit Summary and PPPS were made available to the patient.    Follow Up:   Next Medicare Wellness visit to be scheduled in 1 year.        Diagnoses and all orders for this visit:    1. Benign hypertension (Primary)    2. Hypothyroidism (acquired)    3. Impaired fasting glucose    4. Overweight    5. Anxiety and depression    6. Post-COVID syndrome          Recommend at least annual dental and vision screening.  Recommend annual influenza vaccination  Recommend a varied diet and appropriate portion sizes.   CDC recommendations for physical activity:  At least 150 minutes a week (for example, 30 minutes a day, 5 days a week) of moderate-intensity activity such as brisk walking. Or can consider 75 minutes a week of vigorous-intensity activity such as hiking, jogging, or running.  At least 2 days a week of activities that strengthen muscles.  Plus activities to improve balance.  Health Maintenance Due   Topic Date Due    Pneumococcal Vaccine 50+ (1 of 2 - PCV) Never done    TDAP/TD VACCINES (1 - Tdap) Never done    RSV Vaccine - Adults (1 - 1-dose 75+ series) Never done    COVID-19 Vaccine (4 - 2024-25 season) 09/01/2024    COLORECTAL CANCER SCREENING  09/30/2024    DXA SCAN  06/12/2025     She thinks she had PCV 20 at [facility] - Called and it seems as though she did not.  She did have both of her shingles vaccines.      History of Present Illness  The patient is a 79-year-old female who presents today for a Medicare wellness visit.    She reports a general improvement  in her health status compared to the previous year. She has been experiencing significant stress due to an ongoing relocation process. She is uncertain about her pneumonia vaccination status. Her dental health is up-to-date, with a recent visit to the dentist confirming no issues. She is scheduled for an ophthalmology appointment to update her eyeglass prescription. She has a history of cataract surgery, which corrected her astigmatism and eliminated the need for glasses, except for reading. However, post-COVID-19, she developed anisometropia, with one eye being farsighted and the other nearsighted, necessitating the use of glasses to prevent dizziness. She also suffers from severe dry eyes, which precludes the use of contact lenses. She maintains a diet devoid of red meat, chemicals, preservatives, and white carbohydrates, although she occasionally indulges in ice cream.    IMMUNIZATIONS  She is unsure about her pneumonia vaccine status.    Assessment & Plan  1. Medicare wellness visit.  Her laboratory results are generally satisfactory, with a slight elevation in cholesterol levels, albeit within acceptable limits. Her diabetes indicators are within the normal range, negating the need for immediate re-evaluation. Thyroid function is also within normal parameters. She is advised to maintain adequate hydration to support kidney function. She is encouraged to continue her current dietary practices, including avoiding red meat, chemicals, preservatives, and white carbs, while allowing for occasional indulgences such as ice cream.    PROCEDURE  The patient has a history of cataract surgery, which corrected her astigmatism and eliminated the need for glasses, except for reading.    Patient or patient representative verbalized consent for the use of Ambient Listening during the visit with  Feliz Tirado MD for chart documentation. 3/27/2025  13:08 CDT        Result Review :  The following data was reviewed by: Feliz  Herson Tirado MD on 03/27/2025:  CMP          10/14/2024    10:55 12/27/2024    12:38 1/21/2025    07:21   CMP   Glucose 81  115  88    BUN 21  21  19    Creatinine 0.90  0.95  0.93    EGFR 65.6  61.5  63.0    Sodium 138  138  143    Potassium 4.1  4.0  4.4    Chloride 102  103  105    Calcium 9.2  9.6  9.5    Total Protein 6.3  7.0  6.7    Albumin 4.3  4.1  4.1    Globulin 2.0  2.9  2.6    Total Bilirubin 0.3  0.4  0.4    Alkaline Phosphatase 145  107  114    AST (SGOT) 32  28  29    ALT (SGPT) 20  18  14    Albumin/Globulin Ratio 2.2  1.4  1.6    BUN/Creatinine Ratio 23.3  22.1  20.4    Anion Gap  13.0       CBC w/diff          12/27/2024    12:38 1/21/2025    07:21   CBC w/Diff   WBC 5.46  3.19    RBC 4.24  4.42    Hemoglobin 13.4  13.8    Hematocrit 40.3  41.6    MCV 95.0  94.1    MCH 31.6  31.2    MCHC 33.3  33.2    RDW 12.6  12.0    Platelets 187  180    Neutrophil Rel % 63.3  54.6    Immature Granulocyte Rel % 0.0     Lymphocyte Rel % 19.6  33.2    Monocyte Rel % 16.3  9.1    Eosinophil Rel % 0.4  2.2    Basophil Rel % 0.4  0.6      Lipid Panel          1/21/2025    07:21   Lipid Panel   Total Cholesterol 166    Triglycerides 82    HDL Cholesterol 52    VLDL Cholesterol 15    LDL Cholesterol  99      TSH          10/14/2024    10:55 1/21/2025    07:21   TSH   TSH 2.490  3.900      A1C Last 3 Results          1/21/2025    07:21   HGBA1C Last 3 Results   Hemoglobin A1C 4.80        UA          1/21/2025    07:21   Urinalysis   Blood, UA Negative    Leukocytes, UA Negative    Nitrite, UA Negative    RBC, UA 0-2    Bacteria, UA Comment           Follow Up:   Return in about 6 months (around 9/27/2025), or if symptoms worsen or fail to improve, for follow up for above problems. Longitudinal care..     An After Visit Summary and PPPS were made available to the patient.                   KIARA Tirado MD, FACP, FHM      Electronically signed by Feliz Tirado MD, 03/27/25, 9:23 AM CDT.

## 2025-04-14 DIAGNOSIS — J45.30 MILD PERSISTENT ASTHMA WITHOUT COMPLICATION: ICD-10-CM

## 2025-04-15 RX ORDER — THEOPHYLLINE 300 MG/1
300 TABLET, EXTENDED RELEASE ORAL 2 TIMES DAILY
Qty: 180 TABLET | Refills: 3 | Status: SHIPPED | OUTPATIENT
Start: 2025-04-15

## 2025-04-15 NOTE — TELEPHONE ENCOUNTER
Rx Refill Note  Requested Prescriptions     Pending Prescriptions Disp Refills    theophylline (THEODUR) 300 MG 12 hr tablet [Pharmacy Med Name: THEOPHYLLINE ER TABS 300MG] 180 tablet 3     Sig: TAKE 1 TABLET TWICE A DAY      Last office visit with prescribing clinician: 10/3/2024   Last telemedicine visit with prescribing clinician: Visit date not found   Next office visit with prescribing clinician: 10/3/2025                         Would you like a call back once the refill request has been completed: [] Yes [] No    If the office needs to give you a call back, can they leave a voicemail: [] Yes [] No    Karie Senior MA  04/15/25, 07:53 CDT

## 2025-04-21 RX ORDER — LEVOTHYROXINE SODIUM 100 MCG
100 TABLET ORAL DAILY
Qty: 90 TABLET | Refills: 3 | Status: SHIPPED | OUTPATIENT
Start: 2025-04-21

## 2025-04-28 DIAGNOSIS — J30.89 NON-SEASONAL ALLERGIC RHINITIS, UNSPECIFIED TRIGGER: ICD-10-CM

## 2025-04-28 DIAGNOSIS — R09.81 SINUS CONGESTION: ICD-10-CM

## 2025-04-28 RX ORDER — LORATADINE 10 MG/1
10 TABLET ORAL DAILY
Qty: 90 TABLET | Refills: 3 | Status: SHIPPED | OUTPATIENT
Start: 2025-04-28

## 2025-06-11 ENCOUNTER — TELEPHONE (OUTPATIENT)
Dept: INTERNAL MEDICINE | Facility: CLINIC | Age: 80
End: 2025-06-11

## 2025-06-11 DIAGNOSIS — Z12.31 SCREENING MAMMOGRAM, ENCOUNTER FOR: Primary | ICD-10-CM

## 2025-06-11 NOTE — TELEPHONE ENCOUNTER
Caller: Thania Casiano    Relationship: Self    Best call back number: [phone]     What orders are you requesting (i.e. lab or imaging): ORDER FOR ANNUAL SCREENING MAMMOGRAM    In what timeframe would the patient need to come in: PATIENT HAS AN APPOINTMENT ON 06.20.25    Where will you receive your lab/imaging services: [facility]     Additional notes:     PLEASE FOLLOW-UP WITH PATIENT REGARDING THIS ORDER REQUEST.

## 2025-06-20 DIAGNOSIS — Z12.31 SCREENING MAMMOGRAM, ENCOUNTER FOR: ICD-10-CM

## 2025-06-30 ENCOUNTER — OFFICE VISIT (OUTPATIENT)
Dept: INTERNAL MEDICINE | Facility: CLINIC | Age: 80
End: 2025-06-30
Payer: MEDICARE

## 2025-06-30 VITALS
OXYGEN SATURATION: 96 % | SYSTOLIC BLOOD PRESSURE: 142 MMHG | HEART RATE: 57 BPM | HEIGHT: 65 IN | TEMPERATURE: 98 F | WEIGHT: 172 LBS | DIASTOLIC BLOOD PRESSURE: 72 MMHG | BODY MASS INDEX: 28.66 KG/M2

## 2025-06-30 DIAGNOSIS — F95.8 BEHAVIORAL TIC: ICD-10-CM

## 2025-06-30 DIAGNOSIS — J45.998 ALLERGIC-INFECTIVE ASTHMA: ICD-10-CM

## 2025-06-30 DIAGNOSIS — R41.3 MEMORY IMPAIRMENT: ICD-10-CM

## 2025-06-30 DIAGNOSIS — I10 BENIGN HYPERTENSION: ICD-10-CM

## 2025-06-30 DIAGNOSIS — E03.9 HYPOTHYROIDISM (ACQUIRED): ICD-10-CM

## 2025-06-30 DIAGNOSIS — R42 DIZZINESS: Primary | ICD-10-CM

## 2025-06-30 DIAGNOSIS — R29.6 FREQUENT FALLS: ICD-10-CM

## 2025-06-30 DIAGNOSIS — R26.89 BALANCE DISORDER: ICD-10-CM

## 2025-06-30 DIAGNOSIS — F32.A ANXIETY AND DEPRESSION: ICD-10-CM

## 2025-06-30 DIAGNOSIS — R26.9 ABNORMAL GAIT: ICD-10-CM

## 2025-06-30 DIAGNOSIS — J30.81 ALLERGIC RHINITIS DUE TO ANIMAL HAIR AND DANDER: ICD-10-CM

## 2025-06-30 DIAGNOSIS — U09.9 POST-COVID SYNDROME: ICD-10-CM

## 2025-06-30 DIAGNOSIS — F41.9 ANXIETY AND DEPRESSION: ICD-10-CM

## 2025-06-30 PROCEDURE — 3078F DIAST BP <80 MM HG: CPT | Performed by: INTERNAL MEDICINE

## 2025-06-30 PROCEDURE — 1126F AMNT PAIN NOTED NONE PRSNT: CPT | Performed by: INTERNAL MEDICINE

## 2025-06-30 PROCEDURE — 3077F SYST BP >= 140 MM HG: CPT | Performed by: INTERNAL MEDICINE

## 2025-06-30 PROCEDURE — 99214 OFFICE O/P EST MOD 30 MIN: CPT | Performed by: INTERNAL MEDICINE

## 2025-06-30 PROCEDURE — G2211 COMPLEX E/M VISIT ADD ON: HCPCS | Performed by: INTERNAL MEDICINE

## 2025-06-30 RX ORDER — FLUTICASONE PROPIONATE 50 MCG
2 SPRAY, SUSPENSION (ML) NASAL DAILY
Qty: 9.9 G | Refills: 2 | Status: SHIPPED | OUTPATIENT
Start: 2025-06-30

## 2025-06-30 NOTE — PROGRESS NOTES
"      Chief Complaint  Hypertension, Dizziness (Pt moved to [facility] in [place] in Feb/March and had a fall while making her bed, which she attributed to not being used to having carpet and had shoes with  to them.  She fell forward and hit her head, did not seek medical attention.  Since then, she has felt like she has a balance issue and feels lightheaded.  Says she had noticed this somewhat prior to the fall, but definitely unsure of herself since falling.), and Shortness of Breath ([place]ions not being able to \"get a deep breath in her lungs\" - has hx of asthma and long Covid.  States the assisting living facility allows pets and she is allergic to pets - wonders if related)    Subjective        Thania Casiano presents to [facility]    HPI    Patient here for the above problems.  See Assessment and Plan for further HPI components.      Review of Systems    Objective   Vital Signs:  /72 (BP Location: Left arm, Patient Position: Sitting)   Pulse 57   Temp 98 °F (36.7 °C)   Ht 165.1 cm (65\")   Wt 78 kg (172 lb)   SpO2 96%   BMI 28.62 kg/m²   Estimated body mass index is 28.62 kg/m² as calculated from the following:    Height as of this encounter: 165.1 cm (65\").    Weight as of this encounter: 78 kg (172 lb).      Physical Exam  Vitals and nursing note reviewed.   Constitutional:       Appearance: She is not ill-appearing.   Eyes:      General: No scleral icterus.     Conjunctiva/sclera: Conjunctivae normal.   Cardiovascular:      Rate and Rhythm: Normal rate and regular rhythm.   Pulmonary:      Effort: Pulmonary effort is normal. No respiratory distress.   Skin:     General: Skin is warm.      Coloration: Skin is not pale.   Neurological:      General: No focal deficit present.      Mental Status: She is alert and oriented to person, place, and time.   Psychiatric:         Mood and Affect: Mood normal.         Behavior: Behavior normal.                     "   Assessment and Plan   Diagnoses and all orders for this visit:    1. Dizziness (Primary)  -     Ambulatory Referral to Physical Therapy for Evaluation & Treatment  -     MRI Brain Without Contrast; Future    2. Balance disorder  -     Ambulatory Referral to Physical Therapy for Evaluation & Treatment    3. Abnormal gait  -     Ambulatory Referral to Physical Therapy for Evaluation & Treatment    4. Frequent falls  -     Ambulatory Referral to Physical Therapy for Evaluation & Treatment  -     MRI Brain Without Contrast; Future    5. Allergic rhinitis due to animal hair and dander  -     fluticasone (FLONASE) 50 MCG/ACT nasal spray; Administer 2 sprays into the nostril(s) as directed by provider Daily.  Dispense: 9.9 g; Refill: 2    6. Hypothyroidism (acquired)    7. Benign hypertension    8. Post-COVID syndrome    9. Allergic-infective asthma    10. Behavioral tic  -     MRI Brain Without Contrast; Future    11. Memory impairment  -     MRI Brain Without Contrast; Future    12. Anxiety and depression        History of Present Illness  The patient is a 80-year-old female who presents today for dizziness, falls, and worsening brain function.    She has been experiencing persistent dizziness, which has led to several falls. She reports that her balance issues are particularly concerning. She has been engaging in treadmill exercises at the [facility] due to the inability to walk outdoors in hot weather. She also mentions a long-standing issue of her right leg being shorter than the left by half an inch, a condition she was unaware of until recently. She has a history of back problems, including a herniated disc that required surgical intervention. She has previously sought chiropractic treatment for these issues.    She has discontinued the use of Singulair as it caused excessive dryness and breathing difficulties. She does not use nasal sprays. She differentiates her current symptoms from her asthma and manages them with  deep breathing exercises. She notes that this season has been particularly challenging for her due to the presence of pets, to which she is allergic. She underwent allergy testing years ago. Today, she reports good nasal breathing, although she occasionally experiences nasal congestion throughout the day.    She believes her blood pressure readings may be elevated due to recent weight gain and dietary changes. She also mentions recent stress related to moving from a house to an apartment.    PAST SURGICAL HISTORY: The patient has a history of herniated disc surgery.      Assessment & Plan  Dizziness. Persistent dizziness noted, with previous discussions of tic disorders. Frequent falls. Memory impairment. Multiple falls.  MRI of the brain ordered to rule out serious conditions.  Thought about CT scan given recent fall with hitting her head but do not think it would give me the detail needed for all of the above.Discussed the likelihood of age and medication-related causes.  Referral made for physical therapy to address balance and dizziness. Advised to contact physical therapy at the [facility] for an assessment. Noted abnormal gait and balance disorder.    Allergic rhinitis. Symptoms consistent with allergic rhinitis, exacerbated by pet exposure. Prescription for Flonase provided to manage symptoms. Advised to use Flonase seasonally if effective. Discussed the impact of environmental allergens on symptoms.    Patient has persistent asthma symptoms but she reports the current symptoms different then her allergy/post-covid symptoms.    Anxieity and depression - stable.  Patient moved into assisted living and seems to be doing well with this at present.     Hypertension. Blood pressure reading today at 142/72 mmHg, consistently elevated in recent visits. Advised to monitor blood pressure regularly. Recommended lifestyle modifications, including dietary changes, to manage hypertension. Previous readings noted as 150/70 mmHg  and consistently higher than usual.    Follow-up. Follow-up appointment scheduled for 10/2025.      Result Review :                               Follow Up   Return in about 3 months (around 9/30/2025), or if symptoms worsen or fail to improve, for follow up for above problems. Longitudinal care..  Patient was given instructions and counseling regarding her condition or for health maintenance advice. Please see specific information pulled into the AVS if appropriate.       KIARA Tirado MD, FACP, FH      Electronically signed by Feliz Tirado MD, 06/30/25, 12:36 PM CDT.    Patient or patient representative verbalized consent for the use of Ambient Listening during the visit with  Feliz Tirado MD for chart documentation. 6/30/2025  12:40 CDT

## 2025-07-23 ENCOUNTER — HOSPITAL ENCOUNTER (OUTPATIENT)
Dept: MRI IMAGING | Facility: HOSPITAL | Age: 80
Discharge: HOME | End: 2025-07-23
Admitting: INTERNAL MEDICINE
Payer: MEDICARE

## 2025-07-23 DIAGNOSIS — R42 DIZZINESS: ICD-10-CM

## 2025-07-23 DIAGNOSIS — R29.6 FREQUENT FALLS: ICD-10-CM

## 2025-07-23 DIAGNOSIS — F95.8 BEHAVIORAL TIC: ICD-10-CM

## 2025-07-23 DIAGNOSIS — R41.3 MEMORY IMPAIRMENT: ICD-10-CM

## 2025-07-23 PROCEDURE — 70551 MRI BRAIN STEM W/O DYE: CPT

## 2025-07-24 DIAGNOSIS — H74.93 DISORDER OF BOTH MASTOIDS: ICD-10-CM

## 2025-07-24 DIAGNOSIS — H81.4 PERSISTENT VERTIGO OF CENTRAL ORIGIN: Primary | ICD-10-CM

## 2025-08-05 ENCOUNTER — TELEPHONE (OUTPATIENT)
Dept: INTERNAL MEDICINE | Facility: CLINIC | Age: 80
End: 2025-08-05
Payer: MEDICARE

## 2025-08-05 DIAGNOSIS — H74.93: ICD-10-CM

## 2025-08-05 DIAGNOSIS — H81.4 VERTIGO OF CENTRAL ORIGIN: Primary | ICD-10-CM

## 2025-08-05 RX ORDER — CELECOXIB 200 MG/1
200 CAPSULE ORAL DAILY
Qty: 90 CAPSULE | Refills: 3 | Status: SHIPPED | OUTPATIENT
Start: 2025-08-05

## 2025-08-27 RX ORDER — SPIRONOLACTONE 25 MG/1
25 TABLET ORAL DAILY
Qty: 90 TABLET | Refills: 3 | Status: SHIPPED | OUTPATIENT
Start: 2025-08-27

## (undated) DEVICE — SNAR POLYP SENSATION MICRO OVL 13 240X40

## (undated) DEVICE — CONMED SCOPE SAVER BITE BLOCK, 20X27 MM: Brand: SCOPE SAVER

## (undated) DEVICE — TRY PREP SCRB VAG PVP

## (undated) DEVICE — GLV SURG BIOGEL LTX PF 6 1/2

## (undated) DEVICE — LP VESL MINI BLU PK/2

## (undated) DEVICE — PACK,UNIVERSAL,NO GOWNS: Brand: MEDLINE

## (undated) DEVICE — ENDOGATOR AUXILIARY WATER JET CONNECTOR: Brand: ENDOGATOR

## (undated) DEVICE — PK SPINE CERV ANT 30

## (undated) DEVICE — THE DISPOSABLE ROTH NET FOREIGN BODY STANDARD RETRIEVAL DEVICE IS USED IN THE ENDOSCOPIC RETRIEVAL OF FOREIGN BODY, FOOD BOLUS AND EXCISED TISSUE SUCH AS POLYPS.: Brand: ROTH NET

## (undated) DEVICE — FRCP BX RADJAW4 NDL 2.8 240 STD OG

## (undated) DEVICE — SUT NUROLON 3/0 RB1 CR8 18IN C553D

## (undated) DEVICE — CUFF,BP,DISP,1 TUBE,ADULT,HP: Brand: MEDLINE

## (undated) DEVICE — Device: Brand: DEFENDO AIR/WATER/SUCTION AND BIOPSY VALVE

## (undated) DEVICE — SUT MNCRYL 4/0 PS2 27IN UD MCP426H

## (undated) DEVICE — SUT SILK 4/0 SUTUPAK TIES 24IN SA73H

## (undated) DEVICE — PROXIMATE RH ROTATING HEAD SKIN STAPLERS (35 WIDE) CONTAINS 35 STAINLESS STEEL STAPLES: Brand: PROXIMATE

## (undated) DEVICE — CVR PROB GEN PURP W ISOSILK 6X48

## (undated) DEVICE — TBG SMPL FLTR LINE NASL 02/C02 A/ BX/100

## (undated) DEVICE — SYR CONTRL LUERLOK 10CC

## (undated) DEVICE — SUT ETHIB 3/0 SH DA 36IN X522H

## (undated) DEVICE — THE CHANNEL CLEANING BRUSH IS A NYLON FLEXI BRUSH ATTACHED TO A FLEXIBLE PLASTIC SHEATH DESIGNED TO SAFELY REMOVE DEBRIS FROM FLEXIBLE ENDOSCOPES.

## (undated) DEVICE — SUT SILK 0 SUTUPAK TIES 24IN SA76G

## (undated) DEVICE — INTENDED FOR TISSUE SEPARATION, AND OTHER PROCEDURES THAT REQUIRE A SHARP SURGICAL BLADE TO PUNCTURE OR CUT.: Brand: BARD-PARKER ® STAINLESS STEEL BLADES

## (undated) DEVICE — THE SINGLE USE ETRAP – POLYP TRAP IS USED FOR SUCTION RETRIEVAL OF ENDOSCOPICALLY REMOVED POLYPS.: Brand: ETRAP

## (undated) DEVICE — SUT SILK 2/0 SUTUPAK TIES 24IN SA75H

## (undated) DEVICE — NDL HYPO PRECISIONGLIDE REG 22G 1 1/2

## (undated) DEVICE — YANKAUER,BULB TIP WITH VENT: Brand: ARGYLE

## (undated) DEVICE — UTILITY MARKER W/MED LABELS: Brand: MEDLINE

## (undated) DEVICE — ANTIBACTERIAL UNDYED BRAIDED (POLYGLACTIN 910), SYNTHETIC ABSORBABLE SUTURE: Brand: COATED VICRYL

## (undated) DEVICE — GLV SURG TRIUMPH GREEN W/ALOE PF LTX 8 STRL

## (undated) DEVICE — GLV SURG BIOGEL LTX PF 8

## (undated) DEVICE — SUT SILK 3/0 SUTUPAK TIES 24IN SA74H

## (undated) DEVICE — PK TURNOVER RM ADV

## (undated) DEVICE — FRCP BIOP ENDO CAPTURAPRO SPK SERR 2.8MM 230CM

## (undated) DEVICE — SENSR O2 OXIMAX FNGR A/ 18IN NONSTR

## (undated) DEVICE — MASK,OXYGEN,MED CONC,ADLT,7' TUB, UC: Brand: PENDING